# Patient Record
Sex: MALE | Race: WHITE | Employment: FULL TIME | URBAN - METROPOLITAN AREA
[De-identification: names, ages, dates, MRNs, and addresses within clinical notes are randomized per-mention and may not be internally consistent; named-entity substitution may affect disease eponyms.]

---

## 2018-08-02 ENCOUNTER — HOSPITAL ENCOUNTER (OUTPATIENT)
Age: 48
Discharge: HOME | End: 2018-08-04
Payer: COMMERCIAL

## 2018-08-02 ENCOUNTER — OFFICE VISIT (OUTPATIENT)
Dept: FAMILY MEDICINE CLINIC | Age: 48
End: 2018-08-02
Payer: COMMERCIAL

## 2018-08-02 DIAGNOSIS — Z00.00 ANNUAL PHYSICAL EXAM: ICD-10-CM

## 2018-08-02 DIAGNOSIS — Z00.00 ANNUAL PHYSICAL EXAM: Primary | ICD-10-CM

## 2018-08-02 PROBLEM — I10 BENIGN ESSENTIAL HTN: Status: ACTIVE | Noted: 2018-08-02

## 2018-08-02 LAB
ALBUMIN SERPL-MCNC: 4.8 G/DL (ref 3.5–5.2)
ALP BLD-CCNC: 50 U/L (ref 40–129)
ALT SERPL-CCNC: 17 U/L (ref 0–40)
ANION GAP SERPL CALCULATED.3IONS-SCNC: 20 MMOL/L (ref 7–16)
AST SERPL-CCNC: 17 U/L (ref 0–39)
BASOPHILS ABSOLUTE: 0.07 E9/L (ref 0–0.2)
BASOPHILS RELATIVE PERCENT: 0.9 % (ref 0–2)
BILIRUB SERPL-MCNC: 0.6 MG/DL (ref 0–1.2)
BUN BLDV-MCNC: 14 MG/DL (ref 6–20)
CALCIUM SERPL-MCNC: 9.7 MG/DL (ref 8.6–10.2)
CHLORIDE BLD-SCNC: 100 MMOL/L (ref 98–107)
CHOLESTEROL, TOTAL: 268 MG/DL (ref 0–199)
CO2: 19 MMOL/L (ref 22–29)
CREAT SERPL-MCNC: 0.9 MG/DL (ref 0.7–1.2)
EOSINOPHILS ABSOLUTE: 0.13 E9/L (ref 0.05–0.5)
EOSINOPHILS RELATIVE PERCENT: 1.7 % (ref 0–6)
GFR AFRICAN AMERICAN: >60
GFR NON-AFRICAN AMERICAN: >60 ML/MIN/1.73
GLUCOSE BLD-MCNC: 96 MG/DL (ref 74–109)
HBA1C MFR BLD: 5.4 % (ref 4–5.6)
HCT VFR BLD CALC: 46.7 % (ref 37–54)
HDLC SERPL-MCNC: 73 MG/DL
HEMOGLOBIN: 15.5 G/DL (ref 12.5–16.5)
IMMATURE GRANULOCYTES #: 0.05 E9/L
IMMATURE GRANULOCYTES %: 0.7 % (ref 0–5)
LDL CHOLESTEROL CALCULATED: 172 MG/DL (ref 0–99)
LYMPHOCYTES ABSOLUTE: 2.41 E9/L (ref 1.5–4)
LYMPHOCYTES RELATIVE PERCENT: 31.8 % (ref 20–42)
MCH RBC QN AUTO: 32.8 PG (ref 26–35)
MCHC RBC AUTO-ENTMCNC: 33.2 % (ref 32–34.5)
MCV RBC AUTO: 98.7 FL (ref 80–99.9)
MONOCYTES ABSOLUTE: 0.64 E9/L (ref 0.1–0.95)
MONOCYTES RELATIVE PERCENT: 8.4 % (ref 2–12)
NEUTROPHILS ABSOLUTE: 4.29 E9/L (ref 1.8–7.3)
NEUTROPHILS RELATIVE PERCENT: 56.5 % (ref 43–80)
PDW BLD-RTO: 12.4 FL (ref 11.5–15)
PLATELET # BLD: 332 E9/L (ref 130–450)
PMV BLD AUTO: 9.6 FL (ref 7–12)
POTASSIUM SERPL-SCNC: 4.3 MMOL/L (ref 3.5–5)
RBC # BLD: 4.73 E12/L (ref 3.8–5.8)
SODIUM BLD-SCNC: 139 MMOL/L (ref 132–146)
TOTAL PROTEIN: 7.9 G/DL (ref 6.4–8.3)
TRIGL SERPL-MCNC: 117 MG/DL (ref 0–149)
TSH SERPL DL<=0.05 MIU/L-ACNC: 1.08 UIU/ML (ref 0.27–4.2)
URIC ACID, SERUM: 7.9 MG/DL (ref 3.4–7)
VLDLC SERPL CALC-MCNC: 23 MG/DL
WBC # BLD: 7.6 E9/L (ref 4.5–11.5)

## 2018-08-02 PROCEDURE — 83036 HEMOGLOBIN GLYCOSYLATED A1C: CPT

## 2018-08-02 PROCEDURE — 85025 COMPLETE CBC W/AUTO DIFF WBC: CPT

## 2018-08-02 PROCEDURE — 80061 LIPID PANEL: CPT

## 2018-08-02 PROCEDURE — 84550 ASSAY OF BLOOD/URIC ACID: CPT

## 2018-08-02 PROCEDURE — 84443 ASSAY THYROID STIM HORMONE: CPT

## 2018-08-02 PROCEDURE — 99396 PREV VISIT EST AGE 40-64: CPT | Performed by: FAMILY MEDICINE

## 2018-08-02 PROCEDURE — 36415 COLL VENOUS BLD VENIPUNCTURE: CPT

## 2018-08-02 PROCEDURE — 80053 COMPREHEN METABOLIC PANEL: CPT

## 2018-08-02 RX ORDER — EPINEPHRINE 0.3 MG/.3ML
0.3 INJECTION SUBCUTANEOUS ONCE
Qty: 0.3 ML | Refills: 5 | Status: SHIPPED | OUTPATIENT
Start: 2018-08-02 | End: 2021-04-26 | Stop reason: ALTCHOICE

## 2018-08-02 RX ORDER — TADALAFIL 20 MG/1
20 TABLET ORAL PRN
Qty: 30 TABLET | Refills: 5 | Status: SHIPPED | OUTPATIENT
Start: 2018-08-02 | End: 2020-10-13 | Stop reason: SDUPTHER

## 2018-08-02 RX ORDER — VALACYCLOVIR HYDROCHLORIDE 500 MG/1
500 TABLET, FILM COATED ORAL 2 TIMES DAILY
Qty: 60 TABLET | Refills: 5 | Status: SHIPPED | OUTPATIENT
Start: 2018-08-02 | End: 2020-10-13 | Stop reason: SDUPTHER

## 2018-08-02 RX ORDER — PREDNISONE 20 MG/1
40 TABLET ORAL DAILY
Qty: 10 TABLET | Refills: 5 | Status: SHIPPED | OUTPATIENT
Start: 2018-08-02 | End: 2020-10-17 | Stop reason: SDUPTHER

## 2018-08-02 RX ORDER — AMLODIPINE BESYLATE AND BENAZEPRIL HYDROCHLORIDE 10; 20 MG/1; MG/1
CAPSULE ORAL
Qty: 90 CAPSULE | Refills: 5 | Status: SHIPPED | OUTPATIENT
Start: 2018-08-02 | End: 2019-10-27 | Stop reason: SDUPTHER

## 2018-08-02 RX ORDER — AMLODIPINE BESYLATE AND BENAZEPRIL HYDROCHLORIDE 10; 20 MG/1; MG/1
CAPSULE ORAL
Qty: 90 CAPSULE | Refills: 5 | Status: SHIPPED | OUTPATIENT
Start: 2018-08-02 | End: 2018-08-02

## 2018-08-02 RX ORDER — INDOMETHACIN 50 MG/1
50 CAPSULE ORAL 3 TIMES DAILY PRN
Qty: 90 CAPSULE | Refills: 4 | Status: SHIPPED | OUTPATIENT
Start: 2018-08-02 | End: 2020-05-07 | Stop reason: SDUPTHER

## 2018-08-02 ASSESSMENT — PATIENT HEALTH QUESTIONNAIRE - PHQ9
SUM OF ALL RESPONSES TO PHQ QUESTIONS 1-9: 0
SUM OF ALL RESPONSES TO PHQ9 QUESTIONS 1 & 2: 0
2. FEELING DOWN, DEPRESSED OR HOPELESS: 0
1. LITTLE INTEREST OR PLEASURE IN DOING THINGS: 0

## 2018-08-04 VITALS
WEIGHT: 245 LBS | HEIGHT: 72 IN | SYSTOLIC BLOOD PRESSURE: 138 MMHG | DIASTOLIC BLOOD PRESSURE: 80 MMHG | BODY MASS INDEX: 33.18 KG/M2 | HEART RATE: 74 BPM | OXYGEN SATURATION: 95 %

## 2018-08-04 ASSESSMENT — ENCOUNTER SYMPTOMS
DIARRHEA: 0
BACK PAIN: 0
EYE ITCHING: 0
WHEEZING: 0
EYE PAIN: 0
EYE DISCHARGE: 0
RECTAL PAIN: 0
VOMITING: 0
EYE REDNESS: 0
ANAL BLEEDING: 0
PHOTOPHOBIA: 0
FACIAL SWELLING: 0
CONSTIPATION: 0
CHOKING: 0
CHEST TIGHTNESS: 0
SORE THROAT: 0
BLOOD IN STOOL: 0
RHINORRHEA: 0
ABDOMINAL DISTENTION: 0
COLOR CHANGE: 0
ABDOMINAL PAIN: 0
SINUS PRESSURE: 0
SHORTNESS OF BREATH: 0
COUGH: 0
NAUSEA: 0
TROUBLE SWALLOWING: 0
VOICE CHANGE: 0
STRIDOR: 0
APNEA: 0

## 2018-08-04 NOTE — PROGRESS NOTES
nausea, rectal pain and vomiting. Endocrine: Negative for cold intolerance, heat intolerance, polydipsia, polyphagia and polyuria. Genitourinary: Negative for decreased urine volume, difficulty urinating, discharge, dysuria, enuresis, flank pain, frequency, genital sores, hematuria, penile pain, penile swelling, scrotal swelling, testicular pain and urgency. Musculoskeletal: Negative for arthralgias, back pain, gait problem, joint swelling, myalgias, neck pain and neck stiffness. Skin: Negative for color change, pallor, rash and wound. Allergic/Immunologic: Negative for environmental allergies, food allergies and immunocompromised state. Neurological: Negative for dizziness, tremors, seizures, syncope, facial asymmetry, speech difficulty, weakness, light-headedness, numbness and headaches. Hematological: Negative for adenopathy. Does not bruise/bleed easily. Psychiatric/Behavioral: Negative for agitation, behavioral problems, confusion, decreased concentration, dysphoric mood, hallucinations, self-injury, sleep disturbance and suicidal ideas. The patient is not nervous/anxious and is not hyperactive.       Past Medical History:   Diagnosis Date    Gout     Hypertension        Social History   Substance Use Topics    Smoking status: Current Every Day Smoker     Packs/day: 1.50    Smokeless tobacco: Never Used    Alcohol use 0.0 oz/week      Comment: social       Family History   Problem Relation Age of Onset    Cancer Father         leukomia       Current Outpatient Prescriptions   Medication Sig Dispense Refill    EPINEPHrine (EPIPEN 2-EVERTON) 0.3 MG/0.3ML SOAJ injection Inject 0.3 mLs into the muscle once for 1 dose Use as directed for allergic reaction 0.3 mL 5    valACYclovir (VALTREX) 500 MG tablet Take 1 tablet by mouth 2 times daily 60 tablet 5    indomethacin (INDOCIN) 50 MG capsule Take 1 capsule by mouth 3 times daily as needed for Pain (with meals) 90 capsule 4    tadalafil (CIALIS) Genitourinary:   Genitourinary Comments: Deferred by patient   Musculoskeletal: Normal range of motion. He exhibits no edema or tenderness. Lymphadenopathy:     He has no cervical adenopathy. Neurological: He is alert and oriented to person, place, and time. He has normal reflexes. No cranial nerve deficit. He exhibits normal muscle tone. Coordination normal.   Skin: Skin is warm and dry. No rash noted. He is not diaphoretic. No erythema. No pallor. Psychiatric: He has a normal mood and affect. His behavior is normal. Judgment and thought content normal.   Nursing note and vitals reviewed. Assessment / Plan:      Riddhi Nick was seen today for annual exam and hypertension. Diagnoses and all orders for this visit:    Annual physical exam  -     TSH without Reflex; Future  -     Hemoglobin A1C; Future  -     CBC Auto Differential; Future  -     Comprehensive Metabolic Panel; Future  -     Lipid Panel; Future  -     URIC ACID; Future    Other orders  -     EPINEPHrine (EPIPEN 2-EVERTON) 0.3 MG/0.3ML SOAJ injection; Inject 0.3 mLs into the muscle once for 1 dose Use as directed for allergic reaction  -     Discontinue: amLODIPine-benazepril (LOTREL) 10-20 MG per capsule; TAKE 1 CAPSULE DAILY  -     valACYclovir (VALTREX) 500 MG tablet; Take 1 tablet by mouth 2 times daily  -     indomethacin (INDOCIN) 50 MG capsule; Take 1 capsule by mouth 3 times daily as needed for Pain (with meals)  -     tadalafil (CIALIS) 20 MG tablet; Take 1 tablet by mouth as needed for Erectile Dysfunction  -     predniSONE (DELTASONE) 20 MG tablet; Take 2 tablets by mouth daily for 5 days  -     amLODIPine-benazepril (LOTREL) 10-20 MG per capsule; TAKE 1 CAPSULE DAILY      reviewed health maintenance report. Patient is aware of deficiencies and suggested preventative tests. Discussed case at length.

## 2018-08-06 DIAGNOSIS — F17.211 CIGARETTE NICOTINE DEPENDENCE IN REMISSION: ICD-10-CM

## 2018-08-06 NOTE — TELEPHONE ENCOUNTER
From: Carolynn Sebastian  Sent: 8/6/2018 3:27 PM EDT  Subject: Medication Renewal Request    Carolynn Sebastian would like a refill of the following medications:     varenicline (CHANTIX CONTINUING MONTH PAK) 1 MG tablet [Chase Khanna, DO]     varenicline (CHANTIX STARTING MONTH PAK) 0.5 MG X 11 & 1 MG X 42 tablet Love Morgan, ]    Preferred pharmacy: [address] [place] [phone] - F [phone]

## 2018-08-08 ENCOUNTER — TELEPHONE (OUTPATIENT)
Dept: FAMILY MEDICINE CLINIC | Age: 48
End: 2018-08-08

## 2018-08-08 DIAGNOSIS — E78.5 HYPERLIPIDEMIA, UNSPECIFIED HYPERLIPIDEMIA TYPE: ICD-10-CM

## 2018-08-08 DIAGNOSIS — M10.9 GOUT, UNSPECIFIED CAUSE, UNSPECIFIED CHRONICITY, UNSPECIFIED SITE: ICD-10-CM

## 2018-08-08 RX ORDER — VARENICLINE TARTRATE 1 MG/1
1 TABLET, FILM COATED ORAL 2 TIMES DAILY
Qty: 60 TABLET | Refills: 5 | Status: SHIPPED | OUTPATIENT
Start: 2018-08-08 | End: 2019-08-22 | Stop reason: SDUPTHER

## 2018-08-08 RX ORDER — VARENICLINE TARTRATE 25 MG
KIT ORAL
Qty: 1 EACH | Refills: 0 | Status: SHIPPED | OUTPATIENT
Start: 2018-08-08 | End: 2018-08-14 | Stop reason: CLARIF

## 2018-08-08 NOTE — TELEPHONE ENCOUNTER
----- Message from Emma Rubi DO sent at 8/6/2018 11:41 PM EDT -----  bw looks very good except elevated cholesterol. Its high enough that we should probably start a medication.  However if he wants to try three months of oct red rice yeast we can do that.recheck fasting in three months

## 2019-06-05 ENCOUNTER — OFFICE VISIT (OUTPATIENT)
Dept: FAMILY MEDICINE CLINIC | Age: 49
End: 2019-06-05
Payer: COMMERCIAL

## 2019-06-05 VITALS
BODY MASS INDEX: 30.07 KG/M2 | WEIGHT: 222 LBS | OXYGEN SATURATION: 97 % | DIASTOLIC BLOOD PRESSURE: 84 MMHG | SYSTOLIC BLOOD PRESSURE: 136 MMHG | RESPIRATION RATE: 18 BRPM | HEIGHT: 72 IN | HEART RATE: 81 BPM

## 2019-06-05 DIAGNOSIS — S22.000A CLOSED COMPRESSION FRACTURE OF THORACIC VERTEBRA, INITIAL ENCOUNTER (HCC): Primary | ICD-10-CM

## 2019-06-05 DIAGNOSIS — S32.010A CLOSED COMPRESSION FRACTURE OF FIRST LUMBAR VERTEBRA, INITIAL ENCOUNTER: ICD-10-CM

## 2019-06-05 PROCEDURE — 99214 OFFICE O/P EST MOD 30 MIN: CPT | Performed by: FAMILY MEDICINE

## 2019-06-05 RX ORDER — ORPHENADRINE CITRATE 100 MG/1
100 TABLET, EXTENDED RELEASE ORAL 2 TIMES DAILY
Qty: 60 TABLET | Refills: 0 | Status: SHIPPED | OUTPATIENT
Start: 2019-06-05 | End: 2019-07-05

## 2019-06-05 RX ORDER — OXYCODONE AND ACETAMINOPHEN 10; 325 MG/1; MG/1
1 TABLET ORAL EVERY 4 HOURS PRN
Qty: 60 TABLET | Refills: 0 | Status: SHIPPED | OUTPATIENT
Start: 2019-06-05 | End: 2019-07-25 | Stop reason: SDUPTHER

## 2019-06-05 RX ORDER — INDOMETHACIN 50 MG/1
50 CAPSULE ORAL 2 TIMES DAILY WITH MEALS
Qty: 60 CAPSULE | Refills: 3 | Status: SHIPPED | OUTPATIENT
Start: 2019-06-05 | End: 2019-07-25

## 2019-06-05 ASSESSMENT — PATIENT HEALTH QUESTIONNAIRE - PHQ9
1. LITTLE INTEREST OR PLEASURE IN DOING THINGS: 0
SUM OF ALL RESPONSES TO PHQ9 QUESTIONS 1 & 2: 0
2. FEELING DOWN, DEPRESSED OR HOPELESS: 0
SUM OF ALL RESPONSES TO PHQ QUESTIONS 1-9: 0
SUM OF ALL RESPONSES TO PHQ QUESTIONS 1-9: 0

## 2019-06-06 ASSESSMENT — ENCOUNTER SYMPTOMS
BACK PAIN: 1
ANAL BLEEDING: 0
SINUS PRESSURE: 0
CHEST TIGHTNESS: 0
COLOR CHANGE: 0
ABDOMINAL PAIN: 0
VOMITING: 0
RHINORRHEA: 0
APNEA: 0
CHOKING: 0
WHEEZING: 0
STRIDOR: 0
DIARRHEA: 0
FACIAL SWELLING: 0
COUGH: 0
RECTAL PAIN: 0
VOICE CHANGE: 0
CONSTIPATION: 0
BLOOD IN STOOL: 0
ABDOMINAL DISTENTION: 0
NAUSEA: 0
SHORTNESS OF BREATH: 0
SORE THROAT: 0
TROUBLE SWALLOWING: 0

## 2019-06-06 NOTE — PROGRESS NOTES
Aileen Kemp is a 51 y.o. male  . Subjective:      Patient has history of lumbar fusion by Dr Michelle Arias . Patient fell last week. He was seen in ER and diagnosed with two compression fractures to thoracic and two lumbar. Pain went from 10/10 to about 8/10 currently. Was given percocet and ibuprofen. Is almost out of percocet. No urinary or BM complaints. Pain radiating down both legs. Review of Systems   Constitutional: Negative for activity change, appetite change, chills, diaphoresis, fatigue, fever and unexpected weight change. HENT: Negative for congestion, dental problem, ear discharge, ear pain, facial swelling, hearing loss, mouth sores, nosebleeds, postnasal drip, rhinorrhea, sinus pressure, sneezing, sore throat, tinnitus, trouble swallowing and voice change. Respiratory: Negative for apnea, cough, choking, chest tightness, shortness of breath, wheezing and stridor. Cardiovascular: Negative for chest pain, palpitations and leg swelling. Gastrointestinal: Negative for abdominal distention, abdominal pain, anal bleeding, blood in stool, constipation, diarrhea, nausea, rectal pain and vomiting. Endocrine: Negative for cold intolerance, heat intolerance, polydipsia, polyphagia and polyuria. Musculoskeletal: Positive for arthralgias, back pain, gait problem and myalgias. Skin: Negative for color change, pallor, rash and wound. Psychiatric/Behavioral: Negative for dysphoric mood, self-injury, sleep disturbance and suicidal ideas. The patient is not nervous/anxious.         Past Medical History:   Diagnosis Date    Gout     Hyperlipidemia     Hypertension        Social History     Socioeconomic History    Marital status: Single     Spouse name: Not on file    Number of children: Not on file    Years of education: Not on file    Highest education level: Not on file   Occupational History    Not on file   Social Needs    Financial resource strain: Not on file    Food insecurity: Worry: Not on file     Inability: Not on file    Transportation needs:     Medical: Not on file     Non-medical: Not on file   Tobacco Use    Smoking status: Current Every Day Smoker     Packs/day: 1.50    Smokeless tobacco: Never Used   Substance and Sexual Activity    Alcohol use:  Yes     Alcohol/week: 0.0 oz     Comment: social    Drug use: Not on file    Sexual activity: Not on file   Lifestyle    Physical activity:     Days per week: Not on file     Minutes per session: Not on file    Stress: Not on file   Relationships    Social connections:     Talks on phone: Not on file     Gets together: Not on file     Attends Yarsani service: Not on file     Active member of club or organization: Not on file     Attends meetings of clubs or organizations: Not on file     Relationship status: Not on file    Intimate partner violence:     Fear of current or ex partner: Not on file     Emotionally abused: Not on file     Physically abused: Not on file     Forced sexual activity: Not on file   Other Topics Concern    Not on file   Social History Narrative    Not on file       Family History   Problem Relation Age of Onset    Cancer Father         leukomia       Current Outpatient Medications on File Prior to Visit   Medication Sig Dispense Refill    Red Yeast Rice Extract (RED YEAST RICE PO) Take 1 tablet by mouth daily      varenicline (CHANTIX CONTINUING MONTH EVERTON) 1 MG tablet Take 1 tablet by mouth 2 times daily 60 tablet 5    valACYclovir (VALTREX) 500 MG tablet Take 1 tablet by mouth 2 times daily 60 tablet 5    indomethacin (INDOCIN) 50 MG capsule Take 1 capsule by mouth 3 times daily as needed for Pain (with meals) 90 capsule 4    tadalafil (CIALIS) 20 MG tablet Take 1 tablet by mouth as needed for Erectile Dysfunction 30 tablet 5    amLODIPine-benazepril (LOTREL) 10-20 MG per capsule TAKE 1 CAPSULE DAILY 90 capsule 5    EPINEPHrine (EPIPEN 2-EVERTON) 0.3 MG/0.3ML SOAJ injection Inject 0.3 mLs into the muscle once for 1 dose Use as directed for allergic reaction 0.3 mL 5     No current facility-administered medications on file prior to visit. Allergies   Allergen Reactions    Peanuts [Peanut Oil]        I have reviewed his allergies, medications, problem list, medical,social and family history and have updated as needed in the electronic medical record. Objective:     Physical Exam   Constitutional: He is oriented to person, place, and time. He appears well-developed and well-nourished. No distress. HENT:   Head: Normocephalic and atraumatic. Right Ear: External ear normal.   Left Ear: External ear normal.   Nose: Nose normal.   Mouth/Throat: Oropharynx is clear and moist. No oropharyngeal exudate. Eyes: Pupils are equal, round, and reactive to light. Conjunctivae and EOM are normal. Right eye exhibits no discharge. Left eye exhibits no discharge. No scleral icterus. Neck: Normal range of motion. Neck supple. No JVD present. No tracheal deviation present. No thyromegaly present. Cardiovascular: Normal rate, regular rhythm, normal heart sounds and intact distal pulses. Exam reveals no gallop and no friction rub. No murmur heard. Pulmonary/Chest: Effort normal and breath sounds normal. No stridor. No respiratory distress. He has no wheezes. He has no rales. He exhibits no tenderness. Abdominal: Soft. Bowel sounds are normal. He exhibits no distension and no mass. There is no tenderness. There is no rebound and no guarding. Genitourinary:   Genitourinary Comments: Deferred by patient   Musculoskeletal:   Increased spasm T3 to L1 decreased ROM  Increased spasm L1 to S1 with decreased ROM. + straight leg raising and contralateral straight leg raising tests B/L   Lymphadenopathy:     He has no cervical adenopathy. Neurological: He is alert and oriented to person, place, and time. He has normal reflexes. He displays normal reflexes. No cranial nerve deficit.  He exhibits normal muscle

## 2019-07-24 ENCOUNTER — TELEPHONE (OUTPATIENT)
Dept: FAMILY MEDICINE CLINIC | Age: 49
End: 2019-07-24

## 2019-07-25 ENCOUNTER — OFFICE VISIT (OUTPATIENT)
Dept: FAMILY MEDICINE CLINIC | Age: 49
End: 2019-07-25
Payer: COMMERCIAL

## 2019-07-25 VITALS
BODY MASS INDEX: 29.66 KG/M2 | WEIGHT: 219 LBS | SYSTOLIC BLOOD PRESSURE: 130 MMHG | HEART RATE: 87 BPM | HEIGHT: 72 IN | RESPIRATION RATE: 18 BRPM | OXYGEN SATURATION: 96 % | DIASTOLIC BLOOD PRESSURE: 84 MMHG

## 2019-07-25 DIAGNOSIS — S22.000A CLOSED COMPRESSION FRACTURE OF THORACIC VERTEBRA, INITIAL ENCOUNTER (HCC): ICD-10-CM

## 2019-07-25 DIAGNOSIS — S32.010A CLOSED COMPRESSION FRACTURE OF FIRST LUMBAR VERTEBRA, INITIAL ENCOUNTER: ICD-10-CM

## 2019-07-25 PROCEDURE — 99213 OFFICE O/P EST LOW 20 MIN: CPT | Performed by: FAMILY MEDICINE

## 2019-07-25 RX ORDER — METAXALONE 800 MG/1
800 TABLET ORAL 4 TIMES DAILY
Qty: 120 TABLET | Refills: 3 | Status: SHIPPED | OUTPATIENT
Start: 2019-07-25 | End: 2019-08-24

## 2019-07-25 RX ORDER — OXYCODONE AND ACETAMINOPHEN 10; 325 MG/1; MG/1
1 TABLET ORAL EVERY 4 HOURS PRN
Qty: 120 TABLET | Refills: 0 | Status: SHIPPED | OUTPATIENT
Start: 2019-07-25 | End: 2020-05-07 | Stop reason: SDUPTHER

## 2019-07-25 ASSESSMENT — PATIENT HEALTH QUESTIONNAIRE - PHQ9
SUM OF ALL RESPONSES TO PHQ QUESTIONS 1-9: 0
2. FEELING DOWN, DEPRESSED OR HOPELESS: 0
1. LITTLE INTEREST OR PLEASURE IN DOING THINGS: 0
SUM OF ALL RESPONSES TO PHQ QUESTIONS 1-9: 0
SUM OF ALL RESPONSES TO PHQ9 QUESTIONS 1 & 2: 0

## 2019-07-27 ASSESSMENT — ENCOUNTER SYMPTOMS
CHOKING: 0
RECTAL PAIN: 0
DIARRHEA: 0
SHORTNESS OF BREATH: 0
STRIDOR: 0
APNEA: 0
RHINORRHEA: 0
NAUSEA: 0
ABDOMINAL DISTENTION: 0
BLOOD IN STOOL: 0
VOMITING: 0
BACK PAIN: 1
TROUBLE SWALLOWING: 0
COUGH: 0
SORE THROAT: 0
CONSTIPATION: 0
WHEEZING: 0
COLOR CHANGE: 0
VOICE CHANGE: 0
CHEST TIGHTNESS: 0
ABDOMINAL PAIN: 0
FACIAL SWELLING: 0
ANAL BLEEDING: 0
SINUS PRESSURE: 0

## 2019-07-27 NOTE — PROGRESS NOTES
Byron Blount is a 51 y.o. male  . Subjective:      Patient has history of lumbar fusion by Dr Jose Luis Delgado . Patient fell last week. He was seen in ER and diagnosed with two compression fractures to thoracic and two lumbar. Pain went from 10/10 to about 8/10 currently. Was given percocet and ibuprofen. Is almost out of percocet. No urinary or BM complaints. Pain radiating down both legs. Back Pain   This is a recurrent problem. The current episode started more than 1 month ago. The problem occurs daily. The problem has been gradually improving since onset. The pain is present in the lumbar spine. The quality of the pain is described as aching. The pain does not radiate. The pain is at a severity of 6/10. The pain is moderate. The pain is the same all the time. The symptoms are aggravated by bending, position, standing, twisting and sitting. Stiffness is present all day. Pertinent negatives include no abdominal pain, chest pain or fever. He has tried analgesics for the symptoms. The treatment provided mild relief. Review of Systems   Constitutional: Negative for activity change, appetite change, chills, diaphoresis, fatigue, fever and unexpected weight change. HENT: Negative for congestion, dental problem, ear discharge, ear pain, facial swelling, hearing loss, mouth sores, nosebleeds, postnasal drip, rhinorrhea, sinus pressure, sneezing, sore throat, tinnitus, trouble swallowing and voice change. Respiratory: Negative for apnea, cough, choking, chest tightness, shortness of breath, wheezing and stridor. Cardiovascular: Negative for chest pain, palpitations and leg swelling. Gastrointestinal: Negative for abdominal distention, abdominal pain, anal bleeding, blood in stool, constipation, diarrhea, nausea, rectal pain and vomiting. Endocrine: Negative for cold intolerance, heat intolerance, polydipsia, polyphagia and polyuria.    Musculoskeletal: Positive for arthralgias, back pain, gait problem and

## 2019-08-22 DIAGNOSIS — F17.211 CIGARETTE NICOTINE DEPENDENCE IN REMISSION: ICD-10-CM

## 2019-08-22 RX ORDER — VARENICLINE TARTRATE 1 MG/1
1 TABLET, FILM COATED ORAL 2 TIMES DAILY
Qty: 60 TABLET | Refills: 5 | Status: SHIPPED
Start: 2019-08-22 | End: 2020-10-13 | Stop reason: SDUPTHER

## 2019-10-27 DIAGNOSIS — Z00.00 ANNUAL PHYSICAL EXAM: ICD-10-CM

## 2019-10-28 RX ORDER — AMLODIPINE BESYLATE AND BENAZEPRIL HYDROCHLORIDE 10; 20 MG/1; MG/1
CAPSULE ORAL
Qty: 90 CAPSULE | Refills: 4 | Status: SHIPPED
Start: 2019-10-28 | End: 2020-10-13 | Stop reason: SDUPTHER

## 2020-05-07 ENCOUNTER — VIRTUAL VISIT (OUTPATIENT)
Dept: FAMILY MEDICINE CLINIC | Age: 50
End: 2020-05-07
Payer: COMMERCIAL

## 2020-05-07 VITALS — WEIGHT: 215 LBS | BODY MASS INDEX: 29.12 KG/M2 | HEIGHT: 72 IN

## 2020-05-07 PROCEDURE — 99213 OFFICE O/P EST LOW 20 MIN: CPT | Performed by: FAMILY MEDICINE

## 2020-05-07 RX ORDER — OXYCODONE AND ACETAMINOPHEN 10; 325 MG/1; MG/1
1 TABLET ORAL EVERY 4 HOURS PRN
Qty: 120 TABLET | Refills: 0 | Status: SHIPPED
Start: 2020-05-07 | End: 2020-10-13 | Stop reason: SDUPTHER

## 2020-05-07 RX ORDER — INDOMETHACIN 50 MG/1
50 CAPSULE ORAL 3 TIMES DAILY PRN
Qty: 90 CAPSULE | Refills: 4 | Status: SHIPPED
Start: 2020-05-07 | End: 2020-10-13 | Stop reason: SDUPTHER

## 2020-05-07 RX ORDER — CHLORZOXAZONE 500 MG/1
500 TABLET ORAL 4 TIMES DAILY PRN
Qty: 120 TABLET | Refills: 3 | Status: SHIPPED | OUTPATIENT
Start: 2020-05-07 | End: 2020-06-06

## 2020-05-07 ASSESSMENT — PATIENT HEALTH QUESTIONNAIRE - PHQ9
SUM OF ALL RESPONSES TO PHQ QUESTIONS 1-9: 0
SUM OF ALL RESPONSES TO PHQ QUESTIONS 1-9: 0
SUM OF ALL RESPONSES TO PHQ9 QUESTIONS 1 & 2: 0
2. FEELING DOWN, DEPRESSED OR HOPELESS: 0
1. LITTLE INTEREST OR PLEASURE IN DOING THINGS: 0

## 2020-05-11 ASSESSMENT — ENCOUNTER SYMPTOMS
CHEST TIGHTNESS: 0
DIARRHEA: 0
NAUSEA: 0
ANAL BLEEDING: 0
RECTAL PAIN: 0
SHORTNESS OF BREATH: 0
BACK PAIN: 1
CONSTIPATION: 0
SORE THROAT: 0
EYE DISCHARGE: 0
VOMITING: 0
VOICE CHANGE: 0
WHEEZING: 0
PHOTOPHOBIA: 0
ABDOMINAL PAIN: 0
RHINORRHEA: 0
APNEA: 0
STRIDOR: 0
EYE REDNESS: 0
EYE ITCHING: 0
CHOKING: 0
EYE PAIN: 0
ABDOMINAL DISTENTION: 0
TROUBLE SWALLOWING: 0
COUGH: 0
SINUS PRESSURE: 0
BLOOD IN STOOL: 0
COLOR CHANGE: 0
FACIAL SWELLING: 0

## 2020-05-12 NOTE — PROGRESS NOTES
nature of medication and assumes all risks. Patient will not drink alcohol , work or drive while on medication.

## 2020-10-13 ENCOUNTER — OFFICE VISIT (OUTPATIENT)
Dept: FAMILY MEDICINE CLINIC | Age: 50
End: 2020-10-13
Payer: COMMERCIAL

## 2020-10-13 ENCOUNTER — HOSPITAL ENCOUNTER (OUTPATIENT)
Age: 50
Discharge: HOME | End: 2020-10-15
Payer: COMMERCIAL

## 2020-10-13 VITALS
RESPIRATION RATE: 18 BRPM | DIASTOLIC BLOOD PRESSURE: 80 MMHG | SYSTOLIC BLOOD PRESSURE: 138 MMHG | HEART RATE: 105 BPM | BODY MASS INDEX: 31.42 KG/M2 | HEIGHT: 72 IN | WEIGHT: 232 LBS | OXYGEN SATURATION: 98 %

## 2020-10-13 LAB — SARS-COV-2 ANTIBODY, TOTAL: NORMAL

## 2020-10-13 PROCEDURE — 86769 SARS-COV-2 COVID-19 ANTIBODY: CPT

## 2020-10-13 PROCEDURE — 99214 OFFICE O/P EST MOD 30 MIN: CPT | Performed by: FAMILY MEDICINE

## 2020-10-13 PROCEDURE — 36415 COLL VENOUS BLD VENIPUNCTURE: CPT

## 2020-10-13 RX ORDER — TADALAFIL 20 MG/1
20 TABLET ORAL PRN
Qty: 30 TABLET | Refills: 5 | Status: SHIPPED
Start: 2020-10-13 | End: 2020-10-13 | Stop reason: SDUPTHER

## 2020-10-13 RX ORDER — VALACYCLOVIR HYDROCHLORIDE 500 MG/1
500 TABLET, FILM COATED ORAL 2 TIMES DAILY
Qty: 60 TABLET | Refills: 5 | Status: SHIPPED
Start: 2020-10-13 | End: 2020-10-13 | Stop reason: SDUPTHER

## 2020-10-13 RX ORDER — FINASTERIDE 5 MG/1
5 TABLET, FILM COATED ORAL DAILY
Qty: 30 TABLET | Refills: 5 | Status: SHIPPED
Start: 2020-10-13 | End: 2021-04-26 | Stop reason: SDUPTHER

## 2020-10-13 RX ORDER — VARENICLINE TARTRATE 1 MG/1
1 TABLET, FILM COATED ORAL 2 TIMES DAILY
Qty: 60 TABLET | Refills: 5 | Status: SHIPPED
Start: 2020-10-13 | End: 2021-10-27

## 2020-10-13 RX ORDER — INDOMETHACIN 50 MG/1
50 CAPSULE ORAL 3 TIMES DAILY PRN
Qty: 90 CAPSULE | Refills: 4 | Status: SHIPPED
Start: 2020-10-13 | End: 2021-10-27 | Stop reason: SDUPTHER

## 2020-10-13 RX ORDER — AMLODIPINE BESYLATE AND BENAZEPRIL HYDROCHLORIDE 10; 20 MG/1; MG/1
CAPSULE ORAL
Qty: 90 CAPSULE | Refills: 4 | Status: SHIPPED
Start: 2020-10-13 | End: 2021-10-27 | Stop reason: SDUPTHER

## 2020-10-13 RX ORDER — TADALAFIL 20 MG/1
20 TABLET ORAL PRN
Qty: 30 TABLET | Refills: 5 | Status: SHIPPED | OUTPATIENT
Start: 2020-10-13 | End: 2021-10-27 | Stop reason: SDUPTHER

## 2020-10-13 RX ORDER — EPINEPHRINE 0.3 MG/.3ML
0.3 INJECTION SUBCUTANEOUS ONCE
Qty: 2 EACH | Refills: 5 | Status: SHIPPED | OUTPATIENT
Start: 2020-10-13 | End: 2020-10-13

## 2020-10-13 RX ORDER — VALACYCLOVIR HYDROCHLORIDE 500 MG/1
500 TABLET, FILM COATED ORAL 2 TIMES DAILY
Qty: 60 TABLET | Refills: 5 | Status: SHIPPED | OUTPATIENT
Start: 2020-10-13

## 2020-10-13 RX ORDER — OXYCODONE AND ACETAMINOPHEN 10; 325 MG/1; MG/1
1 TABLET ORAL EVERY 4 HOURS PRN
Qty: 120 TABLET | Refills: 0 | Status: SHIPPED | OUTPATIENT
Start: 2020-10-13 | End: 2021-04-26 | Stop reason: SDUPTHER

## 2020-10-16 NOTE — TELEPHONE ENCOUNTER
Patient called asking if Dr. Mary Pacheco would send RX for prednisone for flare ups he gets of Gout. I asked patient if he is currently have a problem and he stated he is not, but would like to know if RX will be sent to pharmacy.     Last seen 10/13/2020  Next appt 4/13/2021  [facility]/[address]os [address]to

## 2020-10-17 RX ORDER — PREDNISONE 20 MG/1
40 TABLET ORAL DAILY
Qty: 10 TABLET | Refills: 5 | Status: SHIPPED | OUTPATIENT
Start: 2020-10-17 | End: 2020-10-22

## 2020-10-18 ASSESSMENT — ENCOUNTER SYMPTOMS
BACK PAIN: 1
COLOR CHANGE: 0
EYE REDNESS: 0
VOICE CHANGE: 0
EYE PAIN: 0
WHEEZING: 0
SINUS PRESSURE: 0
ABDOMINAL PAIN: 0
SORE THROAT: 0
APNEA: 0
SHORTNESS OF BREATH: 0
CHEST TIGHTNESS: 0
PHOTOPHOBIA: 0
NAUSEA: 0
COUGH: 0
TROUBLE SWALLOWING: 0
CHOKING: 0
STRIDOR: 0
VOMITING: 0
RHINORRHEA: 0
DIARRHEA: 0
ABDOMINAL DISTENTION: 0
RECTAL PAIN: 0
EYE ITCHING: 0
EYE DISCHARGE: 0
BLOOD IN STOOL: 0
FACIAL SWELLING: 0
ANAL BLEEDING: 0
CONSTIPATION: 0

## 2020-10-18 NOTE — PROGRESS NOTES
episode started more than 1 year ago. The problem has been gradually improving since onset. The problem is resistant. Pertinent negatives include no chest pain, headaches, neck pain, palpitations or shortness of breath. There are no associated agents to hypertension. Risk factors for coronary artery disease include smoking/tobacco exposure, male gender, stress and dyslipidemia. Past treatments include calcium channel blockers and ACE inhibitors. The current treatment provides moderate improvement. There are no compliance problems. Benign Prostatic Hypertrophy   This is a chronic problem. The current episode started more than 1 year ago. The problem has been waxing and waning since onset. Irritative symptoms do not include frequency or urgency. Obstructive symptoms include a slower stream. Pertinent negatives include no chills, dysuria, hematuria, nausea or vomiting. He is sexually active. Nothing aggravates the symptoms. Past treatments include nothing. The treatment provided no relief. Erectile Dysfunction   This is a chronic problem. The current episode started more than 1 year ago. The problem has been waxing and waning since onset. The nature of his difficulty is maintaining erection. He reports no anxiety. Irritative symptoms do not include frequency or urgency. Obstructive symptoms include a slower stream. Pertinent negatives include no chills, dysuria or hematuria. Nothing aggravates the symptoms. Past treatments include nothing. The treatment provided no relief. Risk factors include hypertension and tobacco use. Review of Systems   Constitutional: Positive for fatigue. Negative for activity change, appetite change, chills, diaphoresis, fever and unexpected weight change.    HENT: Negative for congestion, dental problem, drooling, ear discharge, ear pain, facial swelling, hearing loss, mouth sores, nosebleeds, postnasal drip, rhinorrhea, sinus pressure, sneezing, sore throat, tinnitus, trouble swallowing and voice change. Eyes: Negative for photophobia, pain, discharge, redness, itching and visual disturbance. Respiratory: Negative for apnea, cough, choking, chest tightness, shortness of breath, wheezing and stridor. Cardiovascular: Negative for chest pain, palpitations and leg swelling. Gastrointestinal: Negative for abdominal distention, abdominal pain, anal bleeding, blood in stool, constipation, diarrhea, nausea, rectal pain and vomiting. Endocrine: Negative for cold intolerance, heat intolerance, polydipsia, polyphagia and polyuria. Genitourinary: Negative for decreased urine volume, difficulty urinating, discharge, dysuria, enuresis, flank pain, frequency, genital sores, hematuria, penile pain, penile swelling, scrotal swelling, testicular pain and urgency. ED, slower stream   Musculoskeletal: Positive for arthralgias, back pain, gait problem and myalgias. Negative for joint swelling, neck pain and neck stiffness. Skin: Negative for color change, pallor, rash and wound. Allergic/Immunologic: Negative for environmental allergies, food allergies and immunocompromised state. Neurological: Negative for dizziness, tremors, seizures, syncope, facial asymmetry, speech difficulty, weakness, light-headedness, numbness and headaches. Hematological: Negative for adenopathy. Does not bruise/bleed easily. Psychiatric/Behavioral: Negative for agitation, behavioral problems, confusion, decreased concentration, dysphoric mood, hallucinations, self-injury, sleep disturbance and suicidal ideas. The patient is not nervous/anxious and is not hyperactive.         Past Medical History:   Diagnosis Date    Gout     Hyperlipidemia     Hypertension        Social History     Socioeconomic History    Marital status: Single     Spouse name: Not on file    Number of children: Not on file    Years of education: Not on file    Highest education level: Not on file   Occupational History    Not on file   Social Needs    Financial resource strain: Not on file    Food insecurity     Worry: Not on file     Inability: Not on file    Transportation needs     Medical: Not on file     Non-medical: Not on file   Tobacco Use    Smoking status: Current Every Day Smoker     Packs/day: 1.50    Smokeless tobacco: Never Used   Substance and Sexual Activity    Alcohol use: Yes     Alcohol/week: 0.0 standard drinks     Comment: social    Drug use: Not on file    Sexual activity: Not on file   Lifestyle    Physical activity     Days per week: Not on file     Minutes per session: Not on file    Stress: Not on file   Relationships    Social connections     Talks on phone: Not on file     Gets together: Not on file     Attends Church service: Not on file     Active member of club or organization: Not on file     Attends meetings of clubs or organizations: Not on file     Relationship status: Not on file    Intimate partner violence     Fear of current or ex partner: Not on file     Emotionally abused: Not on file     Physically abused: Not on file     Forced sexual activity: Not on file   Other Topics Concern    Not on file   Social History Narrative    Not on file       Family History   Problem Relation Age of Onset    Cancer Father         leukomia       Current Outpatient Medications on File Prior to Visit   Medication Sig Dispense Refill    Red Yeast Rice Extract (RED YEAST RICE PO) Take 1 tablet by mouth daily      EPINEPHrine (EPIPEN 2-EVERTON) 0.3 MG/0.3ML SOAJ injection Inject 0.3 mLs into the muscle once for 1 dose Use as directed for allergic reaction 0.3 mL 5     No current facility-administered medications on file prior to visit. Allergies   Allergen Reactions    Peanuts [Peanut Oil]        I have reviewed his allergies, medications, problem list, medical,social and family history and have updated as needed in the electronic medical record.     Objective:     Physical Exam  Vitals signs and nursing note reviewed. Constitutional:       General: He is not in acute distress. Appearance: He is well-developed. He is not diaphoretic. HENT:      Head: Normocephalic and atraumatic. Right Ear: External ear normal.      Left Ear: External ear normal.      Nose: Nose normal.      Mouth/Throat:      Pharynx: No oropharyngeal exudate. Eyes:      General: No scleral icterus. Right eye: No discharge. Left eye: No discharge. Conjunctiva/sclera: Conjunctivae normal.      Pupils: Pupils are equal, round, and reactive to light. Neck:      Musculoskeletal: Normal range of motion and neck supple. Thyroid: No thyromegaly. Vascular: No JVD. Trachea: No tracheal deviation. Cardiovascular:      Rate and Rhythm: Normal rate and regular rhythm. Heart sounds: Normal heart sounds. No murmur. No friction rub. No gallop. Pulmonary:      Effort: Pulmonary effort is normal. No respiratory distress. Breath sounds: Normal breath sounds. No stridor. No wheezing or rales. Chest:      Chest wall: No tenderness. Abdominal:      General: Bowel sounds are normal. There is no distension. Palpations: Abdomen is soft. There is no mass. Tenderness: There is no abdominal tenderness. There is no guarding or rebound. Genitourinary:     Comments: Deferred by patient  Musculoskeletal: Normal range of motion. General: No tenderness. Comments: Increased spasm L1 to S1 with decreased ROM. + straight leg raising and contralateral straight leg raising tests B/L   Lymphadenopathy:      Cervical: No cervical adenopathy. Skin:     General: Skin is warm and dry. Coloration: Skin is not pale. Findings: No erythema or rash. Neurological:      Mental Status: He is alert and oriented to person, place, and time. Cranial Nerves: No cranial nerve deficit. Motor: No abnormal muscle tone.       Coordination: Coordination normal.      Deep Tendon Reflexes: Reflexes are normal and symmetric. Reflexes normal.   Psychiatric:         Behavior: Behavior normal.         Thought Content: Thought content normal.         Judgment: Judgment normal.         Assessment / Plan:   Husam Vargas was seen today for annual exam, back pain, fatigue, hyperlipidemia, hypertension, gout, benign prostatic hypertrophy and erectile dysfunction. Diagnoses and all orders for this visit:    Colon cancer screening  -     Cologuard (For External Results Only); Future  -     CBC Auto Differential; Future  -     Comprehensive Metabolic Panel; Future  -     Hemoglobin A1C; Future  -     TSH without Reflex; Future  -     T4, Free; Future  -     Lipid Panel; Future  -     Vitamin B12 & Folate; Future  -     Vitamin D 25 Hydroxy; Future  -     Psa screening; Future  -     Cancel: Covid-19, Antibody; Future    Annual physical exam  -     amLODIPine-benazepril (LOTREL) 10-20 MG per capsule; One a day  -     Discontinue: valACYclovir (VALTREX) 500 MG tablet; Take 1 tablet by mouth 2 times daily  -     Discontinue: tadalafil (CIALIS) 20 MG tablet; Take 1 tablet by mouth as needed for Erectile Dysfunction  -     CBC Auto Differential; Future  -     Comprehensive Metabolic Panel; Future  -     Hemoglobin A1C; Future  -     TSH without Reflex; Future  -     T4, Free; Future  -     Lipid Panel; Future  -     Vitamin B12 & Folate; Future  -     Vitamin D 25 Hydroxy; Future  -     Psa screening; Future  -     Cancel: Covid-19, Antibody; Future  -     valACYclovir (VALTREX) 500 MG tablet; Take 1 tablet by mouth 2 times daily    Closed compression fracture of thoracic vertebra, initial encounter (HCC)  -     indomethacin (INDOCIN) 50 MG capsule; Take 1 capsule by mouth 3 times daily as needed for Pain (with meals)  -     CBC Auto Differential; Future  -     Comprehensive Metabolic Panel; Future  -     Hemoglobin A1C; Future  -     TSH without Reflex;  Future  -     T4, Free; Future  -     Lipid Panel; Future  -     Vitamin B12 & Folate; Future  -     Vitamin D 25 Hydroxy; Future  -     Psa screening; Future  -     Cancel: Covid-19, Antibody; Future  -     External Referral To Orthopedic Surgery  -     oxyCODONE-acetaminophen (PERCOCET)  MG per tablet; Take 1 tablet by mouth every 4 hours as needed for Pain for up to 30 days. Closed compression fracture of L1 vertebra, initial encounter (HCC)  -     indomethacin (INDOCIN) 50 MG capsule; Take 1 capsule by mouth 3 times daily as needed for Pain (with meals)  -     CBC Auto Differential; Future  -     Comprehensive Metabolic Panel; Future  -     Hemoglobin A1C; Future  -     TSH without Reflex; Future  -     T4, Free; Future  -     Lipid Panel; Future  -     Vitamin B12 & Folate; Future  -     Vitamin D 25 Hydroxy; Future  -     Psa screening; Future  -     Cancel: Covid-19, Antibody; Future  -     External Referral To Orthopedic Surgery  -     oxyCODONE-acetaminophen (PERCOCET)  MG per tablet; Take 1 tablet by mouth every 4 hours as needed for Pain for up to 30 days. Lumbar disc disease with radiculopathy  -     indomethacin (INDOCIN) 50 MG capsule; Take 1 capsule by mouth 3 times daily as needed for Pain (with meals)  -     CBC Auto Differential; Future  -     Comprehensive Metabolic Panel; Future  -     Hemoglobin A1C; Future  -     TSH without Reflex; Future  -     T4, Free; Future  -     Lipid Panel; Future  -     Vitamin B12 & Folate; Future  -     Vitamin D 25 Hydroxy; Future  -     Psa screening; Future  -     Cancel: Covid-19, Antibody; Future  -     External Referral To Orthopedic Surgery  -     oxyCODONE-acetaminophen (PERCOCET)  MG per tablet; Take 1 tablet by mouth every 4 hours as needed for Pain for up to 30 days. Cigarette nicotine dependence in remission  -     varenicline (CHANTIX CONTINUING MONTH EVERTON) 1 MG tablet;  Take 1 tablet by mouth 2 times daily  -     CBC Auto Differential; Future  -     Comprehensive Metabolic Panel; Future  -     Hemoglobin A1C; Future  -     TSH without Reflex; Future  -     T4, Free; Future  -     Lipid Panel; Future  -     Vitamin B12 & Folate; Future  -     Vitamin D 25 Hydroxy; Future  -     Psa screening; Future  -     Cancel: Covid-19, Antibody; Future    IFG (impaired fasting glucose)  -     CBC Auto Differential; Future  -     Comprehensive Metabolic Panel; Future  -     Hemoglobin A1C; Future  -     TSH without Reflex; Future  -     T4, Free; Future  -     Lipid Panel; Future  -     Vitamin B12 & Folate; Future  -     Vitamin D 25 Hydroxy; Future  -     Psa screening; Future  -     Cancel: Covid-19, Antibody; Future    Gout, unspecified cause, unspecified chronicity, unspecified site  -     URIC ACID; Future    Peanut allergy  -     EPINEPHrine (EPIPEN 2-EVERTON) 0.3 MG/0.3ML SOAJ injection; Inject 0.3 mLs into the muscle once for 1 dose Use as directed for allergic reaction    Benign prostatic hyperplasia with weak urinary stream  -     Psa screening; Future  -     tadalafil (CIALIS) 20 MG tablet; Take 1 tablet by mouth as needed for Erectile Dysfunction  -     finasteride (PROSCAR) 5 MG tablet; Take 1 tablet by mouth daily    Erectile dysfunction, unspecified erectile dysfunction type  -     Psa screening; Future  -     tadalafil (CIALIS) 20 MG tablet; Take 1 tablet by mouth as needed for Erectile Dysfunction  -     finasteride (PROSCAR) 5 MG tablet; Take 1 tablet by mouth daily      Controlled Substance Monitoring:    Acute and Chronic Pain Monitoring:   RX Monitoring 10/17/2020   Periodic Controlled Substance Monitoring Possible medication side effects, risk of tolerance/dependence & alternative treatments discussed. ;No signs of potential drug abuse or diversion identified.;Obtaining appropriate analgesic effect of treatment. Chronic Pain > 50 MEDD -         Reviewed healthmaintenance report. Patient is aware of deficiencies and suggested preventative tests.

## 2020-10-19 ENCOUNTER — HOSPITAL ENCOUNTER (OUTPATIENT)
Age: 50
Discharge: HOME | End: 2020-10-21
Payer: COMMERCIAL

## 2020-10-19 LAB
ALBUMIN SERPL-MCNC: 4.8 G/DL (ref 3.5–5.2)
ALP BLD-CCNC: 47 U/L (ref 40–129)
ALT SERPL-CCNC: 22 U/L (ref 0–40)
ANION GAP SERPL CALCULATED.3IONS-SCNC: 16 MMOL/L (ref 7–16)
AST SERPL-CCNC: 21 U/L (ref 0–39)
BASOPHILS ABSOLUTE: 0.06 E9/L (ref 0–0.2)
BASOPHILS RELATIVE PERCENT: 0.7 % (ref 0–2)
BILIRUB SERPL-MCNC: 0.8 MG/DL (ref 0–1.2)
BUN BLDV-MCNC: 11 MG/DL (ref 6–20)
CALCIUM SERPL-MCNC: 9.5 MG/DL (ref 8.6–10.2)
CHLORIDE BLD-SCNC: 103 MMOL/L (ref 98–107)
CHOLESTEROL, TOTAL: 257 MG/DL (ref 0–199)
CO2: 20 MMOL/L (ref 22–29)
CREAT SERPL-MCNC: 0.9 MG/DL (ref 0.7–1.2)
EOSINOPHILS ABSOLUTE: 0.17 E9/L (ref 0.05–0.5)
EOSINOPHILS RELATIVE PERCENT: 2.1 % (ref 0–6)
FOLATE: 18.3 NG/ML (ref 4.8–24.2)
GFR AFRICAN AMERICAN: >60
GFR NON-AFRICAN AMERICAN: >60 ML/MIN/1.73
GLUCOSE BLD-MCNC: 95 MG/DL (ref 74–99)
HBA1C MFR BLD: 5.3 % (ref 4–5.6)
HCT VFR BLD CALC: 45.2 % (ref 37–54)
HDLC SERPL-MCNC: 88 MG/DL
HEMOGLOBIN: 15.2 G/DL (ref 12.5–16.5)
IMMATURE GRANULOCYTES #: 0.04 E9/L
IMMATURE GRANULOCYTES %: 0.5 % (ref 0–5)
LDL CHOLESTEROL CALCULATED: 141 MG/DL (ref 0–99)
LYMPHOCYTES ABSOLUTE: 2.64 E9/L (ref 1.5–4)
LYMPHOCYTES RELATIVE PERCENT: 32.1 % (ref 20–42)
MCH RBC QN AUTO: 33 PG (ref 26–35)
MCHC RBC AUTO-ENTMCNC: 33.6 % (ref 32–34.5)
MCV RBC AUTO: 98.3 FL (ref 80–99.9)
MONOCYTES ABSOLUTE: 0.6 E9/L (ref 0.1–0.95)
MONOCYTES RELATIVE PERCENT: 7.3 % (ref 2–12)
NEUTROPHILS ABSOLUTE: 4.71 E9/L (ref 1.8–7.3)
NEUTROPHILS RELATIVE PERCENT: 57.3 % (ref 43–80)
PDW BLD-RTO: 12.8 FL (ref 11.5–15)
PLATELET # BLD: 370 E9/L (ref 130–450)
PMV BLD AUTO: 9.8 FL (ref 7–12)
POTASSIUM SERPL-SCNC: 3.9 MMOL/L (ref 3.5–5)
PROSTATE SPECIFIC ANTIGEN: 0.51 NG/ML (ref 0–4)
RBC # BLD: 4.6 E12/L (ref 3.8–5.8)
SODIUM BLD-SCNC: 139 MMOL/L (ref 132–146)
T4 FREE: 0.98 NG/DL (ref 0.93–1.7)
TOTAL PROTEIN: 7.7 G/DL (ref 6.4–8.3)
TRIGL SERPL-MCNC: 142 MG/DL (ref 0–149)
TSH SERPL DL<=0.05 MIU/L-ACNC: 1.92 UIU/ML (ref 0.27–4.2)
URIC ACID, SERUM: 7.5 MG/DL (ref 3.4–7)
VITAMIN B-12: 362 PG/ML (ref 211–946)
VITAMIN D 25-HYDROXY: 32 NG/ML (ref 30–100)
VLDLC SERPL CALC-MCNC: 28 MG/DL
WBC # BLD: 8.2 E9/L (ref 4.5–11.5)

## 2020-10-19 PROCEDURE — 82746 ASSAY OF FOLIC ACID SERUM: CPT

## 2020-10-19 PROCEDURE — 36415 COLL VENOUS BLD VENIPUNCTURE: CPT

## 2020-10-19 PROCEDURE — 82306 VITAMIN D 25 HYDROXY: CPT

## 2020-10-19 PROCEDURE — 82607 VITAMIN B-12: CPT

## 2020-10-19 PROCEDURE — 80061 LIPID PANEL: CPT

## 2020-10-19 PROCEDURE — G0103 PSA SCREENING: HCPCS

## 2020-10-19 PROCEDURE — 84443 ASSAY THYROID STIM HORMONE: CPT

## 2020-10-19 PROCEDURE — 84439 ASSAY OF FREE THYROXINE: CPT

## 2020-10-19 PROCEDURE — 84550 ASSAY OF BLOOD/URIC ACID: CPT

## 2020-10-19 PROCEDURE — 85025 COMPLETE CBC W/AUTO DIFF WBC: CPT

## 2020-10-19 PROCEDURE — 83036 HEMOGLOBIN GLYCOSYLATED A1C: CPT

## 2020-10-19 PROCEDURE — 80053 COMPREHEN METABOLIC PANEL: CPT

## 2021-01-12 DIAGNOSIS — Z12.11 COLON CANCER SCREENING: ICD-10-CM

## 2021-04-26 ENCOUNTER — OFFICE VISIT (OUTPATIENT)
Dept: FAMILY MEDICINE CLINIC | Age: 51
End: 2021-04-26
Payer: COMMERCIAL

## 2021-04-26 VITALS
HEIGHT: 72 IN | RESPIRATION RATE: 18 BRPM | BODY MASS INDEX: 31.69 KG/M2 | HEART RATE: 114 BPM | OXYGEN SATURATION: 98 % | WEIGHT: 234 LBS | DIASTOLIC BLOOD PRESSURE: 84 MMHG | SYSTOLIC BLOOD PRESSURE: 138 MMHG

## 2021-04-26 DIAGNOSIS — I10 BENIGN ESSENTIAL HTN: Primary | ICD-10-CM

## 2021-04-26 DIAGNOSIS — M51.16 LUMBAR DISC DISEASE WITH RADICULOPATHY: ICD-10-CM

## 2021-04-26 DIAGNOSIS — S22.000A CLOSED COMPRESSION FRACTURE OF THORACIC VERTEBRA, INITIAL ENCOUNTER (HCC): ICD-10-CM

## 2021-04-26 DIAGNOSIS — N52.9 ERECTILE DYSFUNCTION, UNSPECIFIED ERECTILE DYSFUNCTION TYPE: ICD-10-CM

## 2021-04-26 DIAGNOSIS — R39.12 BENIGN PROSTATIC HYPERPLASIA WITH WEAK URINARY STREAM: ICD-10-CM

## 2021-04-26 DIAGNOSIS — R73.01 IFG (IMPAIRED FASTING GLUCOSE): ICD-10-CM

## 2021-04-26 DIAGNOSIS — N40.1 BENIGN PROSTATIC HYPERPLASIA WITH WEAK URINARY STREAM: ICD-10-CM

## 2021-04-26 DIAGNOSIS — S32.010A CLOSED COMPRESSION FRACTURE OF L1 VERTEBRA, INITIAL ENCOUNTER (HCC): ICD-10-CM

## 2021-04-26 DIAGNOSIS — E78.5 HYPERLIPIDEMIA, UNSPECIFIED HYPERLIPIDEMIA TYPE: ICD-10-CM

## 2021-04-26 PROCEDURE — 99214 OFFICE O/P EST MOD 30 MIN: CPT | Performed by: FAMILY MEDICINE

## 2021-04-26 RX ORDER — FINASTERIDE 5 MG/1
5 TABLET, FILM COATED ORAL DAILY
Qty: 30 TABLET | Refills: 5 | Status: SHIPPED
Start: 2021-04-26 | End: 2022-04-27 | Stop reason: ALTCHOICE

## 2021-04-26 RX ORDER — OXYCODONE AND ACETAMINOPHEN 10; 325 MG/1; MG/1
1 TABLET ORAL EVERY 4 HOURS PRN
Qty: 90 TABLET | Refills: 0 | Status: SHIPPED
Start: 2021-04-26 | End: 2021-10-27 | Stop reason: SDUPTHER

## 2021-04-26 ASSESSMENT — PATIENT HEALTH QUESTIONNAIRE - PHQ9
2. FEELING DOWN, DEPRESSED OR HOPELESS: 0
1. LITTLE INTEREST OR PLEASURE IN DOING THINGS: 0
SUM OF ALL RESPONSES TO PHQ QUESTIONS 1-9: 0

## 2021-04-27 ASSESSMENT — ENCOUNTER SYMPTOMS
NAUSEA: 0
BLOOD IN STOOL: 0
COLOR CHANGE: 0
EYE PAIN: 0
PHOTOPHOBIA: 0
ANAL BLEEDING: 0
VOMITING: 0
SHORTNESS OF BREATH: 0
EYE REDNESS: 0
SORE THROAT: 0
STRIDOR: 0
TROUBLE SWALLOWING: 0
RECTAL PAIN: 0
CONSTIPATION: 0
APNEA: 0
WHEEZING: 0
VOICE CHANGE: 0
CHEST TIGHTNESS: 0
RHINORRHEA: 0
SINUS PRESSURE: 0
DIARRHEA: 0
ABDOMINAL DISTENTION: 0
EYE DISCHARGE: 0
ABDOMINAL PAIN: 0
FACIAL SWELLING: 0
COUGH: 0
EYE ITCHING: 0
BACK PAIN: 1
CHOKING: 0

## 2021-04-27 NOTE — PROGRESS NOTES
Jaquelin Quijano is a 45 y.o. male  . Subjective:      Discussed going to see Dr Jennifer Aguayo. Wants to wait until fall to see him so they can plan possible surgery in late fall or winter. Back Pain  This is a chronic problem. The current episode started more than 1 year ago. The problem occurs daily. The problem has been gradually improving since onset. The pain is present in the lumbar spine and thoracic spine. The quality of the pain is described as aching and burning. The pain radiates to the left knee, left thigh, right knee and right thigh. The pain is at a severity of 6/10. The pain is moderate. The pain is the same all the time. The symptoms are aggravated by bending, position, standing and twisting. Stiffness is present all day. Pertinent negatives include no abdominal pain, chest pain, dysuria, fever, headaches, numbness or weakness. He has tried analgesics, NSAIDs and muscle relaxant for the symptoms. The treatment provided moderate relief. Fatigue  This is a recurrent problem. The current episode started more than 1 month ago. The problem occurs daily. The problem has been waxing and waning. Associated symptoms include arthralgias, fatigue and myalgias. Pertinent negatives include no abdominal pain, chest pain, chills, congestion, coughing, diaphoresis, fever, headaches, joint swelling, nausea, neck pain, numbness, rash, sore throat, vomiting or weakness. Nothing aggravates the symptoms. He has tried nothing for the symptoms. The treatment provided no relief. Hyperlipidemia  This is a chronic problem. The current episode started more than 1 year ago. The problem is resistant. Recent lipid tests were reviewed and are variable. Associated symptoms include myalgias. Pertinent negatives include no chest pain or shortness of breath. Current antihyperlipidemic treatment includes herbal therapy. The current treatment provides moderate improvement of lipids. There are no compliance problems.   Risk factors for coronary artery disease include dyslipidemia, hypertension and male sex. Hypertension  This is a chronic problem. The current episode started more than 1 year ago. The problem has been gradually improving since onset. The problem is resistant. Pertinent negatives include no chest pain, headaches, neck pain, palpitations or shortness of breath. There are no associated agents to hypertension. Risk factors for coronary artery disease include smoking/tobacco exposure, male gender, stress and dyslipidemia. Past treatments include calcium channel blockers and ACE inhibitors. The current treatment provides moderate improvement. There are no compliance problems. Benign Prostatic Hypertrophy  This is a chronic problem. The current episode started more than 1 year ago. The problem has been waxing and waning since onset. Irritative symptoms do not include frequency or urgency. Obstructive symptoms include a slower stream. Pertinent negatives include no chills, dysuria, hematuria, nausea or vomiting. He is sexually active. Nothing aggravates the symptoms. Past treatments include nothing. The treatment provided no relief. Erectile Dysfunction  This is a chronic problem. The current episode started more than 1 year ago. The problem has been waxing and waning since onset. The nature of his difficulty is maintaining erection. He reports no anxiety. Irritative symptoms do not include frequency or urgency. Obstructive symptoms include a slower stream. Pertinent negatives include no chills, dysuria or hematuria. Nothing aggravates the symptoms. Past treatments include nothing. The treatment provided no relief. Risk factors include hypertension and tobacco use. Review of Systems   Constitutional: Positive for fatigue. Negative for activity change, appetite change, chills, diaphoresis, fever and unexpected weight change.    HENT: Negative for congestion, dental problem, drooling, ear discharge, ear pain, facial swelling, hearing loss, mouth sores, nosebleeds, postnasal drip, rhinorrhea, sinus pressure, sneezing, sore throat, tinnitus, trouble swallowing and voice change. Eyes: Negative for photophobia, pain, discharge, redness, itching and visual disturbance. Respiratory: Negative for apnea, cough, choking, chest tightness, shortness of breath, wheezing and stridor. Cardiovascular: Negative for chest pain, palpitations and leg swelling. Gastrointestinal: Negative for abdominal distention, abdominal pain, anal bleeding, blood in stool, constipation, diarrhea, nausea, rectal pain and vomiting. Endocrine: Negative for cold intolerance, heat intolerance, polydipsia, polyphagia and polyuria. Genitourinary: Negative for decreased urine volume, difficulty urinating, discharge, dysuria, enuresis, flank pain, frequency, genital sores, hematuria, penile pain, penile swelling, scrotal swelling, testicular pain and urgency. Musculoskeletal: Positive for arthralgias, back pain, gait problem and myalgias. Negative for joint swelling, neck pain and neck stiffness. Skin: Negative for color change, pallor, rash and wound. Allergic/Immunologic: Negative for environmental allergies, food allergies and immunocompromised state. Neurological: Negative for dizziness, tremors, seizures, syncope, facial asymmetry, speech difficulty, weakness, light-headedness, numbness and headaches. Hematological: Negative for adenopathy. Does not bruise/bleed easily. Psychiatric/Behavioral: Negative for agitation, behavioral problems, confusion, decreased concentration, dysphoric mood, hallucinations, self-injury, sleep disturbance and suicidal ideas. The patient is not nervous/anxious and is not hyperactive.         Past Medical History:   Diagnosis Date    Gout     Hyperlipidemia     Hypertension        Social History     Socioeconomic History    Marital status: Single     Spouse name: Not on file    Number of children: Not on file    Erectile Dysfunction 30 tablet 5    Red Yeast Rice Extract (RED YEAST RICE PO) Take 1 tablet by mouth daily      EPINEPHrine (EPIPEN 2-EVERTON) 0.3 MG/0.3ML SOAJ injection Inject 0.3 mLs into the muscle once for 1 dose Use as directed for allergic reaction 2 each 5     No current facility-administered medications on file prior to visit. Allergies   Allergen Reactions    Peanuts [Peanut Oil]        I have reviewed his allergies, medications, problem list, medical,social and family history and have updated as needed in the electronic medical record. Objective:     Physical Exam  Vitals signs and nursing note reviewed. Constitutional:       General: He is not in acute distress. Appearance: He is well-developed. He is not diaphoretic. HENT:      Head: Normocephalic and atraumatic. Right Ear: External ear normal.      Left Ear: External ear normal.      Nose: Nose normal.      Mouth/Throat:      Pharynx: No oropharyngeal exudate. Eyes:      General: No scleral icterus. Right eye: No discharge. Left eye: No discharge. Conjunctiva/sclera: Conjunctivae normal.      Pupils: Pupils are equal, round, and reactive to light. Neck:      Musculoskeletal: Normal range of motion and neck supple. Thyroid: No thyromegaly. Vascular: No JVD. Trachea: No tracheal deviation. Cardiovascular:      Rate and Rhythm: Normal rate and regular rhythm. Heart sounds: Normal heart sounds. No murmur. No friction rub. No gallop. Pulmonary:      Effort: Pulmonary effort is normal. No respiratory distress. Breath sounds: Normal breath sounds. No stridor. No wheezing or rales. Chest:      Chest wall: No tenderness. Abdominal:      General: Bowel sounds are normal. There is no distension. Palpations: Abdomen is soft. There is no mass. Tenderness: There is no abdominal tenderness. There is no guarding or rebound.    Genitourinary:     Comments: Deferred by patient Musculoskeletal:      Comments: Increased spasm L1 to S1 with decreased ROM. + straight leg raising and contralateral straight leg raising tests B/L   Lymphadenopathy:      Cervical: No cervical adenopathy. Skin:     General: Skin is warm and dry. Coloration: Skin is not pale. Findings: No erythema or rash. Neurological:      Mental Status: He is alert and oriented to person, place, and time. Cranial Nerves: No cranial nerve deficit. Motor: No abnormal muscle tone. Coordination: Coordination normal.      Deep Tendon Reflexes: Reflexes are normal and symmetric. Reflexes normal.   Psychiatric:         Behavior: Behavior normal.         Thought Content: Thought content normal.         Judgment: Judgment normal.         Assessment / Plan:   Jose Yanez was seen today for 6 month follow-up. Diagnoses and all orders for this visit:    Benign essential HTN  -     CBC Auto Differential; Future  -     Comprehensive Metabolic Panel; Future  -     TSH without Reflex; Future  -     Hemoglobin A1C; Future  -     Lipid Panel; Future  -     HEPATITIS C ANTIBODY; Future    Hyperlipidemia, unspecified hyperlipidemia type  -     CBC Auto Differential; Future  -     Comprehensive Metabolic Panel; Future  -     TSH without Reflex; Future  -     Hemoglobin A1C; Future  -     Lipid Panel; Future  -     HEPATITIS C ANTIBODY; Future    IFG (impaired fasting glucose)  -     CBC Auto Differential; Future  -     Comprehensive Metabolic Panel; Future  -     TSH without Reflex; Future  -     Hemoglobin A1C; Future  -     Lipid Panel; Future  -     HEPATITIS C ANTIBODY; Future    Closed compression fracture of L1 vertebra, initial encounter (HCC)  -     CBC Auto Differential; Future  -     Comprehensive Metabolic Panel; Future  -     TSH without Reflex; Future  -     Hemoglobin A1C; Future  -     Lipid Panel; Future  -     HEPATITIS C ANTIBODY; Future  -     oxyCODONE-acetaminophen (PERCOCET)  MG per tablet;  Take 1 tablet by mouth every 4 hours as needed for Pain for up to 30 days. Lumbar disc disease with radiculopathy  -     CBC Auto Differential; Future  -     Comprehensive Metabolic Panel; Future  -     TSH without Reflex; Future  -     Hemoglobin A1C; Future  -     Lipid Panel; Future  -     HEPATITIS C ANTIBODY; Future  -     oxyCODONE-acetaminophen (PERCOCET)  MG per tablet; Take 1 tablet by mouth every 4 hours as needed for Pain for up to 30 days. Closed compression fracture of thoracic vertebra, initial encounter ([facility])  -     oxyCODONE-acetaminophen (PERCOCET)  MG per tablet; Take 1 tablet by mouth every 4 hours as needed for Pain for up to 30 days. Benign prostatic hyperplasia with weak urinary stream  -     finasteride (PROSCAR) 5 MG tablet; Take 1 tablet by mouth daily    Erectile dysfunction, unspecified erectile dysfunction type  -     finasteride (PROSCAR) 5 MG tablet; Take 1 tablet by mouth daily      Controlled Substance Monitoring:    Acute and Chronic Pain Monitoring:   RX Monitoring 4/27/2021   Periodic Controlled Substance Monitoring Possible medication side effects, risk of tolerance/dependence & alternative treatments discussed. ;No signs of potential drug abuse or diversion identified.;Obtaining appropriate analgesic effect of treatment. Chronic Pain > 50 MEDD -       Reviewed healthmaintenance report. Patient is aware of deficiencies and suggested preventative tests.

## 2021-10-21 DIAGNOSIS — I10 BENIGN ESSENTIAL HTN: ICD-10-CM

## 2021-10-21 DIAGNOSIS — R73.01 IFG (IMPAIRED FASTING GLUCOSE): ICD-10-CM

## 2021-10-21 DIAGNOSIS — E78.5 HYPERLIPIDEMIA, UNSPECIFIED HYPERLIPIDEMIA TYPE: ICD-10-CM

## 2021-10-21 DIAGNOSIS — M51.16 LUMBAR DISC DISEASE WITH RADICULOPATHY: ICD-10-CM

## 2021-10-21 DIAGNOSIS — S32.010A CLOSED COMPRESSION FRACTURE OF L1 VERTEBRA, INITIAL ENCOUNTER (HCC): ICD-10-CM

## 2021-10-21 LAB
ALBUMIN SERPL-MCNC: 4.7 G/DL (ref 3.5–5.2)
ALP BLD-CCNC: 47 U/L (ref 40–129)
ALT SERPL-CCNC: 14 U/L (ref 0–40)
ANION GAP SERPL CALCULATED.3IONS-SCNC: 16 MMOL/L (ref 7–16)
AST SERPL-CCNC: 16 U/L (ref 0–39)
BASOPHILS ABSOLUTE: 0.07 E9/L (ref 0–0.2)
BASOPHILS RELATIVE PERCENT: 0.9 % (ref 0–2)
BILIRUB SERPL-MCNC: 0.7 MG/DL (ref 0–1.2)
BUN BLDV-MCNC: 14 MG/DL (ref 6–20)
CALCIUM SERPL-MCNC: 9.3 MG/DL (ref 8.6–10.2)
CHLORIDE BLD-SCNC: 99 MMOL/L (ref 98–107)
CHOLESTEROL, TOTAL: 276 MG/DL (ref 0–199)
CO2: 22 MMOL/L (ref 22–29)
CREAT SERPL-MCNC: 0.9 MG/DL (ref 0.7–1.2)
EOSINOPHILS ABSOLUTE: 0.16 E9/L (ref 0.05–0.5)
EOSINOPHILS RELATIVE PERCENT: 2 % (ref 0–6)
GFR AFRICAN AMERICAN: >60
GFR NON-AFRICAN AMERICAN: >60 ML/MIN/1.73
GLUCOSE BLD-MCNC: 102 MG/DL (ref 74–99)
HBA1C MFR BLD: 5.3 % (ref 4–5.6)
HCT VFR BLD CALC: 42.2 % (ref 37–54)
HDLC SERPL-MCNC: 82 MG/DL
HEMOGLOBIN: 14.2 G/DL (ref 12.5–16.5)
IMMATURE GRANULOCYTES #: 0.04 E9/L
IMMATURE GRANULOCYTES %: 0.5 % (ref 0–5)
LDL CHOLESTEROL CALCULATED: 171 MG/DL (ref 0–99)
LYMPHOCYTES ABSOLUTE: 2.84 E9/L (ref 1.5–4)
LYMPHOCYTES RELATIVE PERCENT: 35.1 % (ref 20–42)
MCH RBC QN AUTO: 33.3 PG (ref 26–35)
MCHC RBC AUTO-ENTMCNC: 33.6 % (ref 32–34.5)
MCV RBC AUTO: 99.1 FL (ref 80–99.9)
MONOCYTES ABSOLUTE: 0.56 E9/L (ref 0.1–0.95)
MONOCYTES RELATIVE PERCENT: 6.9 % (ref 2–12)
NEUTROPHILS ABSOLUTE: 4.41 E9/L (ref 1.8–7.3)
NEUTROPHILS RELATIVE PERCENT: 54.6 % (ref 43–80)
PDW BLD-RTO: 12.5 FL (ref 11.5–15)
PLATELET # BLD: 333 E9/L (ref 130–450)
PMV BLD AUTO: 9.3 FL (ref 7–12)
POTASSIUM SERPL-SCNC: 4.6 MMOL/L (ref 3.5–5)
RBC # BLD: 4.26 E12/L (ref 3.8–5.8)
SODIUM BLD-SCNC: 137 MMOL/L (ref 132–146)
TOTAL PROTEIN: 6.7 G/DL (ref 6.4–8.3)
TRIGL SERPL-MCNC: 116 MG/DL (ref 0–149)
TSH SERPL DL<=0.05 MIU/L-ACNC: 0.78 UIU/ML (ref 0.27–4.2)
VLDLC SERPL CALC-MCNC: 23 MG/DL
WBC # BLD: 8.1 E9/L (ref 4.5–11.5)

## 2021-10-22 LAB — HEPATITIS C ANTIBODY INTERPRETATION: NORMAL

## 2021-10-27 ENCOUNTER — OFFICE VISIT (OUTPATIENT)
Dept: FAMILY MEDICINE CLINIC | Age: 51
End: 2021-10-27
Payer: COMMERCIAL

## 2021-10-27 VITALS
BODY MASS INDEX: 32.23 KG/M2 | RESPIRATION RATE: 18 BRPM | HEIGHT: 72 IN | WEIGHT: 238 LBS | SYSTOLIC BLOOD PRESSURE: 124 MMHG | HEART RATE: 102 BPM | DIASTOLIC BLOOD PRESSURE: 84 MMHG | OXYGEN SATURATION: 99 %

## 2021-10-27 DIAGNOSIS — R53.82 CHRONIC FATIGUE: ICD-10-CM

## 2021-10-27 DIAGNOSIS — Z00.00 ANNUAL PHYSICAL EXAM: ICD-10-CM

## 2021-10-27 DIAGNOSIS — N40.1 BENIGN PROSTATIC HYPERPLASIA WITH WEAK URINARY STREAM: ICD-10-CM

## 2021-10-27 DIAGNOSIS — M51.16 LUMBAR DISC DISEASE WITH RADICULOPATHY: Primary | ICD-10-CM

## 2021-10-27 DIAGNOSIS — F17.213 CIGARETTE NICOTINE DEPENDENCE WITH WITHDRAWAL: ICD-10-CM

## 2021-10-27 DIAGNOSIS — R73.01 IFG (IMPAIRED FASTING GLUCOSE): ICD-10-CM

## 2021-10-27 DIAGNOSIS — M10.09 ACUTE IDIOPATHIC GOUT OF MULTIPLE SITES: ICD-10-CM

## 2021-10-27 DIAGNOSIS — R39.12 BENIGN PROSTATIC HYPERPLASIA WITH WEAK URINARY STREAM: ICD-10-CM

## 2021-10-27 DIAGNOSIS — E78.2 MIXED HYPERLIPIDEMIA: ICD-10-CM

## 2021-10-27 DIAGNOSIS — N52.9 ERECTILE DYSFUNCTION, UNSPECIFIED ERECTILE DYSFUNCTION TYPE: ICD-10-CM

## 2021-10-27 DIAGNOSIS — S32.010A CLOSED COMPRESSION FRACTURE OF L1 VERTEBRA, INITIAL ENCOUNTER (HCC): ICD-10-CM

## 2021-10-27 DIAGNOSIS — S22.000A CLOSED COMPRESSION FRACTURE OF THORACIC VERTEBRA, INITIAL ENCOUNTER (HCC): ICD-10-CM

## 2021-10-27 DIAGNOSIS — Z23 NEED FOR SHINGLES VACCINE: ICD-10-CM

## 2021-10-27 PROCEDURE — 99214 OFFICE O/P EST MOD 30 MIN: CPT | Performed by: FAMILY MEDICINE

## 2021-10-27 RX ORDER — ZOSTER VACCINE RECOMBINANT, ADJUVANTED 50 MCG/0.5
0.5 KIT INTRAMUSCULAR SEE ADMIN INSTRUCTIONS
Qty: 0.5 ML | Refills: 0 | Status: SHIPPED | OUTPATIENT
Start: 2021-10-27 | End: 2022-04-25

## 2021-10-27 RX ORDER — INDOMETHACIN 50 MG/1
50 CAPSULE ORAL 3 TIMES DAILY PRN
Qty: 90 CAPSULE | Refills: 4 | Status: SHIPPED | OUTPATIENT
Start: 2021-10-27

## 2021-10-27 RX ORDER — TADALAFIL 20 MG/1
20 TABLET ORAL PRN
Qty: 30 TABLET | Refills: 5 | Status: SHIPPED | OUTPATIENT
Start: 2021-10-27

## 2021-10-27 RX ORDER — AMLODIPINE BESYLATE AND BENAZEPRIL HYDROCHLORIDE 10; 20 MG/1; MG/1
CAPSULE ORAL
Qty: 90 CAPSULE | Refills: 4 | Status: SHIPPED
Start: 2021-10-27 | End: 2022-01-06

## 2021-10-27 RX ORDER — VARENICLINE TARTRATE 1 MG/1
1 TABLET, FILM COATED ORAL 2 TIMES DAILY
Qty: 60 TABLET | Refills: 5 | Status: SHIPPED
Start: 2021-10-27 | End: 2022-04-27 | Stop reason: SDUPTHER

## 2021-10-27 RX ORDER — OXYCODONE AND ACETAMINOPHEN 10; 325 MG/1; MG/1
1 TABLET ORAL EVERY 4 HOURS PRN
Qty: 90 TABLET | Refills: 0 | Status: SHIPPED | OUTPATIENT
Start: 2021-10-27 | End: 2022-04-27 | Stop reason: SDUPTHER

## 2021-10-27 SDOH — ECONOMIC STABILITY: FOOD INSECURITY: WITHIN THE PAST 12 MONTHS, YOU WORRIED THAT YOUR FOOD WOULD RUN OUT BEFORE YOU GOT MONEY TO BUY MORE.: NEVER TRUE

## 2021-10-27 SDOH — ECONOMIC STABILITY: FOOD INSECURITY: WITHIN THE PAST 12 MONTHS, THE FOOD YOU BOUGHT JUST DIDN'T LAST AND YOU DIDN'T HAVE MONEY TO GET MORE.: NEVER TRUE

## 2021-10-27 ASSESSMENT — SOCIAL DETERMINANTS OF HEALTH (SDOH): HOW HARD IS IT FOR YOU TO PAY FOR THE VERY BASICS LIKE FOOD, HOUSING, MEDICAL CARE, AND HEATING?: NOT HARD AT ALL

## 2021-10-27 NOTE — PROGRESS NOTES
Corby Cochran is a 45 y.o. male  . Subjective:      Discussed lipids . Discussed red yeast rice and coenzyme Q 10. Macular degeneration shots monthly. Patient has been having a lot of back pain. We discussed finally going to be evaluated for surgery by DR Adelfo Restrepo. Review of Systems   Constitutional: Positive for fatigue. Negative for activity change, appetite change, chills, diaphoresis, fever and unexpected weight change. HENT: Negative for congestion, dental problem, drooling, ear discharge, ear pain, facial swelling, hearing loss, mouth sores, nosebleeds, postnasal drip, rhinorrhea, sinus pressure, sneezing, sore throat, tinnitus, trouble swallowing and voice change. Eyes: Negative for photophobia, pain, discharge, redness, itching and visual disturbance. Respiratory: Negative for apnea, cough, choking, chest tightness, shortness of breath, wheezing and stridor. Cardiovascular: Negative for chest pain, palpitations and leg swelling. Gastrointestinal: Negative for abdominal distention, abdominal pain, anal bleeding, blood in stool, constipation, diarrhea, nausea, rectal pain and vomiting. Endocrine: Negative for cold intolerance, heat intolerance, polydipsia, polyphagia and polyuria. Genitourinary: Negative for decreased urine volume, difficulty urinating, discharge, dysuria, enuresis, flank pain, frequency, genital sores, hematuria, penile pain, penile swelling, scrotal swelling, testicular pain and urgency. Musculoskeletal: Negative for arthralgias, back pain, gait problem, joint swelling, myalgias, neck pain and neck stiffness. Skin: Negative for color change, pallor, rash and wound. Allergic/Immunologic: Negative for environmental allergies, food allergies and immunocompromised state. Neurological: Negative for dizziness, tremors, seizures, syncope, facial asymmetry, speech difficulty, weakness, light-headedness, numbness and headaches. Hematological: Negative for adenopathy. Does not bruise/bleed easily. Psychiatric/Behavioral: Negative for agitation, behavioral problems, confusion, decreased concentration, dysphoric mood, hallucinations, self-injury, sleep disturbance and suicidal ideas. The patient is not nervous/anxious and is not hyperactive. Past Medical History:   Diagnosis Date    Gout     Hyperlipidemia     Hypertension        Social History     Socioeconomic History    Marital status: Single     Spouse name: Not on file    Number of children: Not on file    Years of education: Not on file    Highest education level: Not on file   Occupational History    Not on file   Tobacco Use    Smoking status: Current Every Day Smoker     Packs/day: 1.50    Smokeless tobacco: Never Used   Substance and Sexual Activity    Alcohol use: Yes     Alcohol/week: 0.0 standard drinks     Comment: social    Drug use: Not on file    Sexual activity: Not on file   Other Topics Concern    Not on file   Social History Narrative    Not on file     Social Determinants of Health     Financial Resource Strain: Low Risk     Difficulty of Paying Living Expenses: Not hard at all   Food Insecurity: No Food Insecurity    Worried About Running Out of Food in the Last Year: Never true    [address] in the Last Year: Never true   Transportation Needs:     Lack of Transportation (Medical):      Lack of Transportation (Non-Medical):    Physical Activity:     Days of Exercise per Week:     Minutes of Exercise per Session:    Stress:     Feeling of Stress :    Social Connections:     Frequency of Communication with Friends and Family:     Frequency of Social Gatherings with Friends and Family:     Attends [employer] Services:     Active Member of Clubs or Organizations:     Attends Club or Organization Meetings:     Marital Status:    Intimate Partner Violence:     Fear of Current or Ex-Partner:     Emotionally Abused:     Physically Abused:     Sexually Abused:        Family History   Problem Relation Age of Onset    Cancer Father         leukomia       Current Outpatient Medications on File Prior to Visit   Medication Sig Dispense Refill    finasteride (PROSCAR) 5 MG tablet Take 1 tablet by mouth daily 30 tablet 5    amLODIPine-benazepril (LOTREL) 10-20 MG per capsule One a day 90 capsule 4    indomethacin (INDOCIN) 50 MG capsule Take 1 capsule by mouth 3 times daily as needed for Pain (with meals) 90 capsule 4    varenicline (CHANTIX CONTINUING MONTH EVERTON) 1 MG tablet Take 1 tablet by mouth 2 times daily 60 tablet 5    valACYclovir (VALTREX) 500 MG tablet Take 1 tablet by mouth 2 times daily 60 tablet 5    tadalafil (CIALIS) 20 MG tablet Take 1 tablet by mouth as needed for Erectile Dysfunction 30 tablet 5    Red Yeast Rice Extract (RED YEAST RICE PO) Take 1 tablet by mouth daily      EPINEPHrine (EPIPEN 2-EVEROTN) 0.3 MG/0.3ML SOAJ injection Inject 0.3 mLs into the muscle once for 1 dose Use as directed for allergic reaction 2 each 5     No current facility-administered medications on file prior to visit. Allergies   Allergen Reactions    Peanuts [Peanut Oil]        I have reviewed his allergies, medications, problem list, medical,social and family history and have updated as needed in the electronic medical record. Objective:     Physical Exam  Vitals and nursing note reviewed. Constitutional:       General: He is not in acute distress. Appearance: He is well-developed. He is not diaphoretic. HENT:      Head: Normocephalic and atraumatic. Right Ear: External ear normal.      Left Ear: External ear normal.      Nose: Nose normal.      Mouth/Throat:      Pharynx: No oropharyngeal exudate. Eyes:      General: No scleral icterus. Right eye: No discharge. Left eye: No discharge. Conjunctiva/sclera: Conjunctivae normal.      Pupils: Pupils are equal, round, and reactive to light. Neck:      Thyroid: No thyromegaly. Vascular: No JVD. Trachea: No tracheal deviation. Cardiovascular:      Rate and Rhythm: Normal rate and regular rhythm. Heart sounds: Normal heart sounds. No murmur heard. No friction rub. No gallop. Pulmonary:      Effort: Pulmonary effort is normal. No respiratory distress. Breath sounds: Normal breath sounds. No stridor. No wheezing or rales. Chest:      Chest wall: No tenderness. Abdominal:      General: Bowel sounds are normal. There is no distension. Palpations: Abdomen is soft. There is no mass. Tenderness: There is no abdominal tenderness. There is no guarding or rebound. Genitourinary:     Comments: Deferred by patient  Musculoskeletal:      Cervical back: Normal range of motion and neck supple. Comments: Increased spasm L1 to S1 with decreased ROM. + straight leg raising and contralateral straight leg raising tests B/L   Lymphadenopathy:      Cervical: No cervical adenopathy. Skin:     General: Skin is warm and dry. Coloration: Skin is not pale. Findings: No erythema or rash. Neurological:      Mental Status: He is alert and oriented to person, place, and time. Cranial Nerves: No cranial nerve deficit. Motor: No abnormal muscle tone. Coordination: Coordination normal.      Deep Tendon Reflexes: Reflexes are normal and symmetric. Reflexes normal.   Psychiatric:         Behavior: Behavior normal.         Thought Content: Thought content normal.         Judgment: Judgment normal.         Assessment / Plan:   Tonja Salgado was seen today for 6 month follow-up. Diagnoses and all orders for this visit:    Lumbar disc disease with radiculopathy  -     External Referral To Orthopedic Surgery  -     indomethacin (INDOCIN) 50 MG capsule; Take 1 capsule by mouth 3 times daily as needed for Pain (with meals)  -     oxyCODONE-acetaminophen (PERCOCET)  MG per tablet; Take 1 tablet by mouth every 4 hours as needed for Pain for up to 30 days.   -     CBC Auto Differential; Future  -     Comprehensive Metabolic Panel; Future  -     MRI LUMBAR SPINE WO CONTRAST; Future    Mixed hyperlipidemia  -     CBC Auto Differential; Future  -     Comprehensive Metabolic Panel; Future  -     TSH without Reflex; Future  -     Lipid Panel; Future    Need for shingles vaccine  -     zoster recombinant adjuvanted vaccine [facility]) 50 MCG/0.5ML SUSR injection; Inject 0.5 mLs into the muscle See Admin Instructions 1 dose now and repeat in 2-6 months  -     CBC Auto Differential; Future  -     Comprehensive Metabolic Panel; Future    Cigarette nicotine dependence with withdrawal  -     varenicline (CHANTIX) 1 MG tablet; Take 1 tablet by mouth 2 times daily  -     CBC Auto Differential; Future  -     Comprehensive Metabolic Panel; Future    Annual physical exam  -     amLODIPine-benazepril (LOTREL) 10-20 MG per capsule; One a day  -     CBC Auto Differential; Future  -     Comprehensive Metabolic Panel; Future    Closed compression fracture of thoracic vertebra, initial encounter (HCC)  -     oxyCODONE-acetaminophen (PERCOCET)  MG per tablet; Take 1 tablet by mouth every 4 hours as needed for Pain for up to 30 days. -     CBC Auto Differential; Future  -     Comprehensive Metabolic Panel; Future    Closed compression fracture of L1 vertebra, initial encounter (HCC)  -     oxyCODONE-acetaminophen (PERCOCET)  MG per tablet; Take 1 tablet by mouth every 4 hours as needed for Pain for up to 30 days. -     CBC Auto Differential; Future  -     Comprehensive Metabolic Panel; Future    Acute idiopathic gout of multiple sites  -     indomethacin (INDOCIN) 50 MG capsule; Take 1 capsule by mouth 3 times daily as needed for Pain (with meals)  -     CBC Auto Differential; Future  -     Comprehensive Metabolic Panel; Future    Benign prostatic hyperplasia with weak urinary stream  -     tadalafil (CIALIS) 20 MG tablet;  Take 1 tablet by mouth as needed for Erectile Dysfunction  -     CBC Auto Differential; Future  -     Comprehensive Metabolic Panel; Future    Erectile dysfunction, unspecified erectile dysfunction type  -     tadalafil (CIALIS) 20 MG tablet; Take 1 tablet by mouth as needed for Erectile Dysfunction  -     CBC Auto Differential; Future  -     Comprehensive Metabolic Panel; Future    IFG (impaired fasting glucose)  -     CBC Auto Differential; Future  -     Comprehensive Metabolic Panel; Future  -     Hemoglobin A1C; Future    Chronic fatigue  -     Vitamin B12 & Folate; Future        Reviewed healthmaintenance report. Patient is aware of deficiencies and suggested preventative tests.

## 2021-11-03 ASSESSMENT — ENCOUNTER SYMPTOMS
WHEEZING: 0
EYE DISCHARGE: 0
STRIDOR: 0
SORE THROAT: 0
RECTAL PAIN: 0
RHINORRHEA: 0
PHOTOPHOBIA: 0
BACK PAIN: 0
CHOKING: 0
COLOR CHANGE: 0
CHEST TIGHTNESS: 0
APNEA: 0
ABDOMINAL DISTENTION: 0
SHORTNESS OF BREATH: 0
COUGH: 0
ANAL BLEEDING: 0
NAUSEA: 0
FACIAL SWELLING: 0
ABDOMINAL PAIN: 0
BLOOD IN STOOL: 0
VOICE CHANGE: 0
EYE PAIN: 0
DIARRHEA: 0
CONSTIPATION: 0
EYE REDNESS: 0
TROUBLE SWALLOWING: 0
VOMITING: 0
SINUS PRESSURE: 0
EYE ITCHING: 0

## 2022-01-06 DIAGNOSIS — Z00.00 ANNUAL PHYSICAL EXAM: ICD-10-CM

## 2022-01-06 RX ORDER — AMLODIPINE BESYLATE AND BENAZEPRIL HYDROCHLORIDE 10; 20 MG/1; MG/1
CAPSULE ORAL
Qty: 90 CAPSULE | Refills: 3 | Status: SHIPPED | OUTPATIENT
Start: 2022-01-06

## 2022-02-17 ENCOUNTER — TELEPHONE (OUTPATIENT)
Dept: FAMILY MEDICINE CLINIC | Age: 52
End: 2022-02-17

## 2022-02-17 NOTE — TELEPHONE ENCOUNTER
Good Afternoon,   I have received denial for mri of lumbar spine for the following reason:  Six weeks of physical therapy. The denial has been scanned into the media tab for your review. A PEER TO PEER can be done by calling [phone] and reference the following  Lina Bridger  Member #: [id number]  [id number] [address]  YOB: 1970    Patient is scheduled for 2/23/22, please advise. Thank you.

## 2022-02-19 NOTE — TELEPHONE ENCOUNTER
Let him know that his insurance denied MRI due to need for therapy. He may have more luck once he sees specialist. They can usually get them covered.

## 2022-02-21 ENCOUNTER — TELEPHONE (OUTPATIENT)
Dept: FAMILY MEDICINE CLINIC | Age: 52
End: 2022-02-21

## 2022-02-21 NOTE — TELEPHONE ENCOUNTER
----- Message from Nancy Barrow sent at 2/21/2022  9:17 AM EST -----  Subject: Message to Provider    QUESTIONS  Information for Provider? Patient's MRI was canceled because insurance   denied to him not having 6 weeks or 6 months of Physical Therapy. Please   call back to further assist   ---------------------------------------------------------------------------  --------------  CALL BACK INFO  What is the best way for the office to contact you? OK to leave message on   voicemail  Preferred Call Back Phone Number? [phone]  ---------------------------------------------------------------------------  --------------  SCRIPT ANSWERS  Relationship to Patient?  Self

## 2022-02-22 NOTE — TELEPHONE ENCOUNTER
I gave a message to people that prior authorize to let him know it was denied. We can set up with therapy or have him wait until he sees back surgeon.

## 2022-02-28 NOTE — TELEPHONE ENCOUNTER
Called pt and he said he's looking into a few places and will call back once he finds out who accepts his insurance.

## 2022-03-01 DIAGNOSIS — M51.16 LUMBAR DISC DISEASE WITH RADICULOPATHY: Primary | ICD-10-CM

## 2022-04-22 DIAGNOSIS — M10.09 ACUTE IDIOPATHIC GOUT OF MULTIPLE SITES: ICD-10-CM

## 2022-04-22 DIAGNOSIS — S32.010A CLOSED COMPRESSION FRACTURE OF L1 VERTEBRA, INITIAL ENCOUNTER (HCC): ICD-10-CM

## 2022-04-22 DIAGNOSIS — E78.2 MIXED HYPERLIPIDEMIA: ICD-10-CM

## 2022-04-22 DIAGNOSIS — R53.82 CHRONIC FATIGUE: ICD-10-CM

## 2022-04-22 DIAGNOSIS — Z23 NEED FOR SHINGLES VACCINE: ICD-10-CM

## 2022-04-22 DIAGNOSIS — Z00.00 ANNUAL PHYSICAL EXAM: ICD-10-CM

## 2022-04-22 DIAGNOSIS — M51.16 LUMBAR DISC DISEASE WITH RADICULOPATHY: ICD-10-CM

## 2022-04-22 DIAGNOSIS — R73.01 IFG (IMPAIRED FASTING GLUCOSE): ICD-10-CM

## 2022-04-22 DIAGNOSIS — R39.12 BENIGN PROSTATIC HYPERPLASIA WITH WEAK URINARY STREAM: ICD-10-CM

## 2022-04-22 DIAGNOSIS — S22.000A CLOSED COMPRESSION FRACTURE OF THORACIC VERTEBRA, INITIAL ENCOUNTER (HCC): ICD-10-CM

## 2022-04-22 DIAGNOSIS — F17.213 CIGARETTE NICOTINE DEPENDENCE WITH WITHDRAWAL: ICD-10-CM

## 2022-04-22 DIAGNOSIS — N52.9 ERECTILE DYSFUNCTION, UNSPECIFIED ERECTILE DYSFUNCTION TYPE: ICD-10-CM

## 2022-04-22 DIAGNOSIS — N40.1 BENIGN PROSTATIC HYPERPLASIA WITH WEAK URINARY STREAM: ICD-10-CM

## 2022-04-22 LAB
ALBUMIN SERPL-MCNC: 4.7 G/DL (ref 3.5–5.2)
ALP BLD-CCNC: 46 U/L (ref 40–129)
ALT SERPL-CCNC: 19 U/L (ref 0–40)
ANION GAP SERPL CALCULATED.3IONS-SCNC: 15 MMOL/L (ref 7–16)
AST SERPL-CCNC: 17 U/L (ref 0–39)
BASOPHILS ABSOLUTE: 0.06 E9/L (ref 0–0.2)
BASOPHILS RELATIVE PERCENT: 0.8 % (ref 0–2)
BILIRUB SERPL-MCNC: 0.5 MG/DL (ref 0–1.2)
BUN BLDV-MCNC: 11 MG/DL (ref 6–20)
CALCIUM SERPL-MCNC: 9.2 MG/DL (ref 8.6–10.2)
CHLORIDE BLD-SCNC: 104 MMOL/L (ref 98–107)
CHOLESTEROL, TOTAL: 258 MG/DL (ref 0–199)
CO2: 22 MMOL/L (ref 22–29)
CREAT SERPL-MCNC: 0.9 MG/DL (ref 0.7–1.2)
EOSINOPHILS ABSOLUTE: 0.3 E9/L (ref 0.05–0.5)
EOSINOPHILS RELATIVE PERCENT: 4.1 % (ref 0–6)
FOLATE: >20 NG/ML (ref 4.8–24.2)
GFR AFRICAN AMERICAN: >60
GFR NON-AFRICAN AMERICAN: >60 ML/MIN/1.73
GLUCOSE BLD-MCNC: 100 MG/DL (ref 74–99)
HBA1C MFR BLD: 5.2 % (ref 4–5.6)
HCT VFR BLD CALC: 42.6 % (ref 37–54)
HDLC SERPL-MCNC: 79 MG/DL
HEMOGLOBIN: 14 G/DL (ref 12.5–16.5)
IMMATURE GRANULOCYTES #: 0.06 E9/L
IMMATURE GRANULOCYTES %: 0.8 % (ref 0–5)
LDL CHOLESTEROL CALCULATED: 165 MG/DL (ref 0–99)
LYMPHOCYTES ABSOLUTE: 2.63 E9/L (ref 1.5–4)
LYMPHOCYTES RELATIVE PERCENT: 35.8 % (ref 20–42)
MCH RBC QN AUTO: 32.4 PG (ref 26–35)
MCHC RBC AUTO-ENTMCNC: 32.9 % (ref 32–34.5)
MCV RBC AUTO: 98.6 FL (ref 80–99.9)
MONOCYTES ABSOLUTE: 0.52 E9/L (ref 0.1–0.95)
MONOCYTES RELATIVE PERCENT: 7.1 % (ref 2–12)
NEUTROPHILS ABSOLUTE: 3.78 E9/L (ref 1.8–7.3)
NEUTROPHILS RELATIVE PERCENT: 51.4 % (ref 43–80)
PDW BLD-RTO: 12.8 FL (ref 11.5–15)
PLATELET # BLD: 337 E9/L (ref 130–450)
PMV BLD AUTO: 9.3 FL (ref 7–12)
POTASSIUM SERPL-SCNC: 4.5 MMOL/L (ref 3.5–5)
RBC # BLD: 4.32 E12/L (ref 3.8–5.8)
SODIUM BLD-SCNC: 141 MMOL/L (ref 132–146)
TOTAL PROTEIN: 7.4 G/DL (ref 6.4–8.3)
TRIGL SERPL-MCNC: 69 MG/DL (ref 0–149)
TSH SERPL DL<=0.05 MIU/L-ACNC: 0.79 UIU/ML (ref 0.27–4.2)
VITAMIN B-12: 428 PG/ML (ref 211–946)
VLDLC SERPL CALC-MCNC: 14 MG/DL
WBC # BLD: 7.4 E9/L (ref 4.5–11.5)

## 2022-04-27 ENCOUNTER — OFFICE VISIT (OUTPATIENT)
Dept: FAMILY MEDICINE CLINIC | Age: 52
End: 2022-04-27
Payer: COMMERCIAL

## 2022-04-27 VITALS
HEIGHT: 72 IN | SYSTOLIC BLOOD PRESSURE: 126 MMHG | RESPIRATION RATE: 18 BRPM | DIASTOLIC BLOOD PRESSURE: 78 MMHG | HEART RATE: 90 BPM | BODY MASS INDEX: 32.37 KG/M2 | OXYGEN SATURATION: 98 % | WEIGHT: 239 LBS

## 2022-04-27 DIAGNOSIS — M51.16 LUMBAR DISC DISEASE WITH RADICULOPATHY: Primary | ICD-10-CM

## 2022-04-27 DIAGNOSIS — S32.010A CLOSED COMPRESSION FRACTURE OF L1 VERTEBRA, INITIAL ENCOUNTER (HCC): ICD-10-CM

## 2022-04-27 DIAGNOSIS — Z98.890 BACK PAIN WITH HISTORY OF SPINAL SURGERY: ICD-10-CM

## 2022-04-27 DIAGNOSIS — E78.2 MIXED HYPERLIPIDEMIA: ICD-10-CM

## 2022-04-27 DIAGNOSIS — S22.000A CLOSED COMPRESSION FRACTURE OF THORACIC VERTEBRA, INITIAL ENCOUNTER (HCC): ICD-10-CM

## 2022-04-27 DIAGNOSIS — N62 GYNECOMASTIA, MALE: ICD-10-CM

## 2022-04-27 DIAGNOSIS — F17.213 CIGARETTE NICOTINE DEPENDENCE WITH WITHDRAWAL: ICD-10-CM

## 2022-04-27 DIAGNOSIS — Z78.9 STATIN INTOLERANCE: ICD-10-CM

## 2022-04-27 DIAGNOSIS — M54.9 BACK PAIN WITH HISTORY OF SPINAL SURGERY: ICD-10-CM

## 2022-04-27 PROCEDURE — 99214 OFFICE O/P EST MOD 30 MIN: CPT | Performed by: FAMILY MEDICINE

## 2022-04-27 RX ORDER — VARENICLINE TARTRATE 1 MG/1
1 TABLET, FILM COATED ORAL 2 TIMES DAILY
Qty: 60 TABLET | Refills: 5 | Status: SHIPPED | OUTPATIENT
Start: 2022-04-27

## 2022-04-27 RX ORDER — OXYCODONE AND ACETAMINOPHEN 10; 325 MG/1; MG/1
1 TABLET ORAL EVERY 4 HOURS PRN
Qty: 90 TABLET | Refills: 0 | Status: SHIPPED
Start: 2022-04-27 | End: 2022-07-27 | Stop reason: SDUPTHER

## 2022-04-27 ASSESSMENT — PATIENT HEALTH QUESTIONNAIRE - PHQ9
2. FEELING DOWN, DEPRESSED OR HOPELESS: 0
SUM OF ALL RESPONSES TO PHQ QUESTIONS 1-9: 0
SUM OF ALL RESPONSES TO PHQ9 QUESTIONS 1 & 2: 0
1. LITTLE INTEREST OR PLEASURE IN DOING THINGS: 0
SUM OF ALL RESPONSES TO PHQ QUESTIONS 1-9: 0

## 2022-04-27 NOTE — PROGRESS NOTES
Meli Mack is a 45 y.o. male  . Subjective:      Patient did 6 weeks of therapy. No improvement. Pain continues to worsen. Dr Mingo Crenshaw wants MRI of Back prior. Will need contrast due to surgery. Will perform at [facility]. Cholesterol discussed . He does not tolerate statin. Did not tolerate RYR. Will start psyllium. We will check another 3 months. But more than likely will want to start a low-dose low potency statin and see if he tolerates it for primary prevention standpoint. Patient understands risks of elevated cholesterol. Patient taking Percocet very very limited basis, patient patient only using when absolutely needs it and prescription of want 90 last multiple months. Hopefully the surgery will be successful the first and we have many years of relatively pain-free. But all starts with the MRI. Extended visit we discussed old records. We discussed physical therapy. She also complains of excess tissue pain and chest/breast area. He states he does not improve when he loses weight has been exercising as much he can with his back pain and pain to lose weight. We discussed an appointment and evaluation for their recommendations by plastic surgery patient agrees. Review of Systems   Constitutional: Positive for fatigue. Negative for activity change, appetite change, chills, diaphoresis, fever and unexpected weight change. HENT: Negative for congestion, dental problem, drooling, ear discharge, ear pain, facial swelling, hearing loss, mouth sores, nosebleeds, postnasal drip, rhinorrhea, sinus pressure, sneezing, sore throat, tinnitus, trouble swallowing and voice change. Eyes: Negative for photophobia, pain, discharge, redness, itching and visual disturbance. Respiratory: Negative for apnea, cough, choking, chest tightness, shortness of breath, wheezing and stridor. Cardiovascular: Negative for chest pain, palpitations and leg swelling.    Gastrointestinal: Negative for abdominal distention, abdominal pain, anal bleeding, blood in stool, constipation, diarrhea, nausea, rectal pain and vomiting. Endocrine: Negative for cold intolerance, heat intolerance, polydipsia, polyphagia and polyuria. Genitourinary: Negative for decreased urine volume, difficulty urinating, dysuria, enuresis, flank pain, frequency, genital sores, hematuria, penile discharge, penile pain, penile swelling, scrotal swelling, testicular pain and urgency. Musculoskeletal: Positive for arthralgias, back pain and gait problem. Negative for joint swelling, myalgias, neck pain and neck stiffness. Skin: Negative for color change, pallor, rash and wound. Gynecomastia   Allergic/Immunologic: Negative for environmental allergies, food allergies and immunocompromised state. Neurological: Negative for dizziness, tremors, seizures, syncope, facial asymmetry, speech difficulty, weakness, light-headedness, numbness and headaches. Hematological: Negative for adenopathy. Does not bruise/bleed easily. Psychiatric/Behavioral: Negative for agitation, behavioral problems, confusion, decreased concentration, dysphoric mood, hallucinations, self-injury, sleep disturbance and suicidal ideas. The patient is not nervous/anxious and is not hyperactive. Past Medical History:   Diagnosis Date    Gout     Hyperlipidemia     Hypertension        Social History     Socioeconomic History    Marital status: Single     Spouse name: Not on file    Number of children: Not on file    Years of education: Not on file    Highest education level: Not on file   Occupational History    Not on file   Tobacco Use    Smoking status: Current Every Day Smoker     Packs/day: 1.50    Smokeless tobacco: Never Used   Substance and Sexual Activity    Alcohol use:  Yes     Alcohol/week: 0.0 standard drinks     Comment: social    Drug use: Not on file    Sexual activity: Not on file   Other Topics Concern    Not on file   Social History Narrative    Not on file     Social Determinants of Health     Financial Resource Strain: Low Risk     Difficulty of Paying Living Expenses: Not hard at all   Food Insecurity: No Food Insecurity    Worried About Running Out of Food in the Last Year: Never true    [address] in the Last Year: Never true   Transportation Needs:     Lack of Transportation (Medical): Not on file    Lack of Transportation (Non-Medical):  Not on file   Physical Activity:     Days of Exercise per Week: Not on file    Minutes of Exercise per Session: Not on file   Stress:     Feeling of Stress : Not on file   Social Connections:     Frequency of Communication with Friends and Family: Not on file    Frequency of Social Gatherings with Friends and Family: Not on file    Attends Denominational Services: Not on file    Active Member of [address] [place] [address] [employer] or Organizations: Not on file    Attends Club or Organization Meetings: Not on file    Marital Status: Not on file   Intimate Partner Violence:     Fear of Current or Ex-Partner: Not on file    Emotionally Abused: Not on file    Physically Abused: Not on file    Sexually Abused: Not on file   Housing Stability:     Unable to Pay for Housing in the Last Year: Not on file    Number of [place] in the Last Year: Not on file    Unstable Housing in the Last Year: Not on file       Family History   Problem Relation Age of Onset    Cancer Father         leukomia       Current Outpatient Medications on File Prior to Visit   Medication Sig Dispense Refill    amLODIPine-benazepril (LOTREL) 10-20 MG per capsule TAKE 1 CAPSULE DAILY 90 capsule 3    varenicline (CHANTIX) 1 MG tablet Take 1 tablet by mouth 2 times daily 60 tablet 5    indomethacin (INDOCIN) 50 MG capsule Take 1 capsule by mouth 3 times daily as needed for Pain (with meals) 90 capsule 4    tadalafil (CIALIS) 20 MG tablet Take 1 tablet by mouth as needed for Erectile Dysfunction 30 tablet 5    valACYclovir (VALTREX) 500 MG tablet Take 1 tablet by mouth 2 times daily 60 tablet 5    Red Yeast Rice Extract (RED YEAST RICE PO) Take 1 tablet by mouth daily      EPINEPHrine (EPIPEN 2-EVERTON) 0.3 MG/0.3ML SOAJ injection Inject 0.3 mLs into the muscle once for 1 dose Use as directed for allergic reaction 2 each 5     No current facility-administered medications on file prior to visit. Allergies   Allergen Reactions    Peanuts [Peanut Oil]        I have reviewed his allergies, medications, problem list, medical,social and family history and have updated as needed in the electronic medical record. Objective:     Physical Exam  Vitals and nursing note reviewed. Constitutional:       General: He is not in acute distress. Appearance: He is well-developed. He is not diaphoretic. HENT:      Head: Normocephalic and atraumatic. Right Ear: External ear normal.      Left Ear: External ear normal.      Nose: Nose normal.      Mouth/Throat:      Pharynx: No oropharyngeal exudate. Eyes:      General: No scleral icterus. Right eye: No discharge. Left eye: No discharge. Conjunctiva/sclera: Conjunctivae normal.      Pupils: Pupils are equal, round, and reactive to light. Neck:      Thyroid: No thyromegaly. Vascular: No JVD. Trachea: No tracheal deviation. Cardiovascular:      Rate and Rhythm: Normal rate and regular rhythm. Heart sounds: Normal heart sounds. No murmur heard. No friction rub. No gallop. Pulmonary:      Effort: Pulmonary effort is normal. No respiratory distress. Breath sounds: Normal breath sounds. No stridor. No wheezing or rales. Chest:      Chest wall: No tenderness. Abdominal:      General: Bowel sounds are normal. There is no distension. Palpations: Abdomen is soft. There is no mass. Tenderness: There is no abdominal tenderness. There is no guarding or rebound.    Genitourinary:     Comments: Deferred by patient  Musculoskeletal:         General: No tenderness. Normal range of motion. Cervical back: Normal range of motion and neck supple. Lymphadenopathy:      Cervical: No cervical adenopathy. Skin:     General: Skin is warm and dry. Coloration: Skin is not pale. Findings: No erythema or rash. Comments: Gynecomastia   Neurological:      Mental Status: He is alert and oriented to person, place, and time. Cranial Nerves: No cranial nerve deficit. Motor: No abnormal muscle tone. Coordination: Coordination normal.      Deep Tendon Reflexes: Reflexes are normal and symmetric. Reflexes normal.   Psychiatric:         Behavior: Behavior normal.         Thought Content: Thought content normal.         Judgment: Judgment normal.         Assessment / Plan:   Felice Yen was seen today for 6 month follow-up. Diagnoses and all orders for this visit:    Lumbar disc disease with radiculopathy  -     MRI LUMBAR SPINE W CONTRAST; Future  -     External Referral To Orthopedic Surgery  -     oxyCODONE-acetaminophen (PERCOCET)  MG per tablet; Take 1 tablet by mouth every 4 hours as needed for Pain for up to 30 days. Cigarette nicotine dependence with withdrawal  -     varenicline (CHANTIX) 1 MG tablet; Take 1 tablet by mouth 2 times daily    Back pain with history of spinal surgery  -     MRI LUMBAR SPINE W CONTRAST; Future  -     External Referral To Orthopedic Surgery  -     oxyCODONE-acetaminophen (PERCOCET)  MG per tablet; Take 1 tablet by mouth every 4 hours as needed for Pain for up to 30 days. Statin intolerance    Mixed hyperlipidemia    Closed compression fracture of thoracic vertebra, initial encounter ([facility])  -     oxyCODONE-acetaminophen (PERCOCET)  MG per tablet; Take 1 tablet by mouth every 4 hours as needed for Pain for up to 30 days. Closed compression fracture of L1 vertebra, initial encounter ([facility])  -     oxyCODONE-acetaminophen (PERCOCET)  MG per tablet;  Take 1 tablet by mouth every 4 hours as needed for Pain for up to 30 days. Gynecomastia, male  -     Kimberly Gutierrez MD, Plastic & Reconstructive Surgery, [address] [facility]    Extended visit 28. Controlled Substance Monitoring:    Acute and Chronic Pain Monitoring:   RX Monitoring 4/28/2022   Periodic Controlled Substance Monitoring Possible medication side effects, risk of tolerance/dependence & alternative treatments discussed. ;No signs of potential drug abuse or diversion identified.;Obtaining appropriate analgesic effect of treatment. Chronic Pain > 50 MEDD -         Reviewed healthmaintenance report. Patient is aware of deficiencies and suggested preventative tests.

## 2022-04-28 DIAGNOSIS — M51.9 LUMBAR DISC DISEASE: Primary | ICD-10-CM

## 2022-04-28 ASSESSMENT — ENCOUNTER SYMPTOMS
PHOTOPHOBIA: 0
EYE ITCHING: 0
EYE DISCHARGE: 0
ABDOMINAL PAIN: 0
APNEA: 0
RHINORRHEA: 0
EYE REDNESS: 0
VOICE CHANGE: 0
ANAL BLEEDING: 0
BLOOD IN STOOL: 0
CONSTIPATION: 0
EYE PAIN: 0
TROUBLE SWALLOWING: 0
CHEST TIGHTNESS: 0
CHOKING: 0
DIARRHEA: 0
VOMITING: 0
NAUSEA: 0
SHORTNESS OF BREATH: 0
STRIDOR: 0
RECTAL PAIN: 0
BACK PAIN: 1
COLOR CHANGE: 0
ROS SKIN COMMENTS: GYNECOMASTIA
SINUS PRESSURE: 0
COUGH: 0
FACIAL SWELLING: 0
SORE THROAT: 0
ABDOMINAL DISTENTION: 0
WHEEZING: 0

## 2022-05-16 DIAGNOSIS — M51.9 LUMBAR DISC DISEASE: ICD-10-CM

## 2022-06-10 ENCOUNTER — OFFICE VISIT (OUTPATIENT)
Dept: SURGERY | Age: 52
End: 2022-06-10
Payer: COMMERCIAL

## 2022-06-10 VITALS
TEMPERATURE: 98.4 F | BODY MASS INDEX: 32.13 KG/M2 | SYSTOLIC BLOOD PRESSURE: 136 MMHG | WEIGHT: 237.2 LBS | DIASTOLIC BLOOD PRESSURE: 84 MMHG | OXYGEN SATURATION: 98 % | HEIGHT: 72 IN | RESPIRATION RATE: 20 BRPM | HEART RATE: 88 BPM

## 2022-06-10 DIAGNOSIS — N62 GYNECOMASTIA: Primary | ICD-10-CM

## 2022-06-10 PROCEDURE — 99203 OFFICE O/P NEW LOW 30 MIN: CPT | Performed by: PHYSICIAN ASSISTANT

## 2022-06-10 NOTE — PROGRESS NOTES
Department of Plastic Surgery - Adult  Attending Consult Note        CHIEF COMPLAINT:  Gynecomastia    History Obtained From:  patient    HISTORY OF PRESENT ILLNESS:                The patient is a 45 y.o. male who presents with gynecomastia. The patient complains of excess bilateral breast tissue for greater than 40 years. Patient states that his breast tissue does not fluctuate throughout the years but has remained enlarged in relation to his upper chest wall and abdomen. Patient states that he first noticed the increase in breast tissue following puberty. He denies any associations with fluctuations in his breast tissue as he states they have been stable over this time. He states his weight has also been stable over the years and has not had fluctuations in the size of his breast tissue related to his overall weight fluctuations. He denies any family history of breast cancer. Patient denies any history of substance abuse including steroid use or heavy alcohol drinking. He states that the larger breast tissue is painful at times with no association in aggravating or alleviating factors he does admit to having a sister with breast cancer. He has never had any laboratory studies or breast imaging completed. He does have chronic back pain which he states he has discussed with his family doctor and his unsure if this can be related to his larger gynecomastia.          Past Medical History:    Past Medical History:   Diagnosis Date    Gout     Hyperlipidemia     Hypertension      Past Surgical History:    Past Surgical History:   Procedure Laterality Date    LUMBAR [address] [facility] SURGERY  2010    OVAL / ROUND WINDOW FISTULA       Current Medications:      Current Outpatient Medications   Medication Sig Dispense Refill    varenicline (CHANTIX) 1 MG tablet Take 1 tablet by mouth 2 times daily 60 tablet 5    amLODIPine-benazepril (LOTREL) 10-20 MG per capsule TAKE 1 CAPSULE DAILY 90 capsule 3    indomethacin (INDOCIN) 50 MG capsule Take 1 capsule by mouth 3 times daily as needed for Pain (with meals) 90 capsule 4    tadalafil (CIALIS) 20 MG tablet Take 1 tablet by mouth as needed for Erectile Dysfunction 30 tablet 5    valACYclovir (VALTREX) 500 MG tablet Take 1 tablet by mouth 2 times daily 60 tablet 5    EPINEPHrine (EPIPEN 2-EVERTON) 0.3 MG/0.3ML SOAJ injection Inject 0.3 mLs into the muscle once for 1 dose Use as directed for allergic reaction 2 each 5    Red Yeast Rice Extract (RED YEAST RICE PO) Take 1 tablet by mouth daily (Patient not taking: Reported on 6/10/2022)       No current facility-administered medications for this visit. Allergies:  Peanuts [peanut oil]    Social History:   Social History     Socioeconomic History    Marital status: Single     Spouse name: Not on file    Number of children: Not on file    Years of education: Not on file    Highest education level: Not on file   Occupational History    Not on file   Tobacco Use    Smoking status: Former Smoker     Types: Pipe    Smokeless tobacco: Never Used    Tobacco comment: pipe occassionally   Vaping Use    Vaping Use: Never used   Substance and Sexual Activity    Alcohol use: Yes     Alcohol/week: 0.0 standard drinks     Comment: social    Drug use: Never    Sexual activity: Not on file   Other Topics Concern    Not on file   Social History Narrative    Not on file     Social Determinants of Health     Financial Resource Strain: Low Risk     Difficulty of Paying Living Expenses: Not hard at all   Food Insecurity: No Food Insecurity    Worried About Running Out of Food in the Last Year: Never true    [address] in the Last Year: Never true   Transportation Needs:     Lack of Transportation (Medical): Not on file    Lack of Transportation (Non-Medical):  Not on file   Physical Activity:     Days of Exercise per Week: Not on file    Minutes of Exercise per Session: Not on file   Stress:     Feeling of Stress : Not on file   Social Connections:     Frequency of Communication with Friends and Family: Not on file    Frequency of Social Gatherings with Friends and Family: Not on file    Attends [employer] Services: Not on file    Active Member of Clubs or Organizations: Not on file    Attends Club or Organization Meetings: Not on file    Marital Status: Not on file   Intimate Partner Violence:     Fear of Current or Ex-Partner: Not on file    Emotionally Abused: Not on file    Physically Abused: Not on file    Sexually Abused: Not on file   Housing Stability:     Unable to Pay for Housing in the Last Year: Not on file    Number of [place] in the Last Year: Not on file    Unstable Housing in the Last Year: Not on file     Family History:   Family History   Problem Relation Age of Onset    Cancer Father         leukomia       REVIEW OF SYSTEMS:    CONSTITUTIONAL:  negative for  fevers, chills, sweats and fatigue  EYES: negative for dipolpia or acute vision loss. RESPIRATORY:  negative for  dry cough, cough with sputum, dyspnea, wheezing and chest pain  CARDIOVASCULAR:  negative for  chest pain, dyspnea, palpitations, syncope  GASTROINTESTINAL:  negative for nausea, vomiting, change in bowel habits, diarrhea, constipation and abdominal pain  EXTREMITIES: negative for edema  MUSCULOSKELETAL: negative for muscle weakness  SKIN: negative for itching or rashes.     BEHAVIOR/PSYCH:  negative for poor appetite, increased appetite, decreased sleep and poor concentration          PHYSICAL EXAM:    VITALS:  /84 (Site: Left Upper Arm, Position: Sitting, Cuff Size: Medium Adult)   Pulse 88   Temp 98.4 °F (36.9 °C) (Infrared)   Resp 20   Ht 6' (1.829 m)   Wt 237 lb 3.2 oz (107.6 kg)   SpO2 98%   BMI 32.17 kg/m²   CONSTITUTIONAL:  awake, alert, cooperative, no apparent distress, and appears stated age  LUNGS:  No increased work of breathing, good air exchange, clear to auscultation bilaterally, no crackles or wheezing  CARDIOVASCULAR:  Normal apical impulse, regular rate and rhythm, normal S1 and S2, no S3 or S4, and no murmur noted  BREAST: Rash is not noted,  no nipple discharge. Mild on palpation breast pain. There are no previous scars  Excess breast adiposity with excess skin. + retroareolar gland hypertrophy. No No masses, No discharge, No axillary LAD. DATA:    Radiology Review: None at this time  Breast Measurements were taken and are noted in the media section. IMPRESSION/RECOMMENDATIONS:      The patient understands that we will need to rule out any undelaying physiologic or endocrine conditions prior to scheduling for surgery. Serum TSH, Free T4, 21 Hydroxy Progesterone, Estradiol, Free Testosterone, BHCG, LH, FSH, Prolactin, Urine Toxicity including Anabolic Steroids. US of bilateral breasts ordered today as well. The perioperative course was discussed with the patient. The risks and benefits, including but not limited to, bleeding, infection, pain, scarring, asymmetry, loss of tissue, unsatisfactory appearance, poor healing, as well as the need for further surgery were discussed with the patient. He would like to proceed with surgery pending his lab work-up. Photos were obtained. Follow Up- Once lab work is completed and reviewed. 2 weeks.     AMBER Baugh

## 2022-07-27 ENCOUNTER — OFFICE VISIT (OUTPATIENT)
Dept: FAMILY MEDICINE CLINIC | Age: 52
End: 2022-07-27
Payer: COMMERCIAL

## 2022-07-27 VITALS
HEART RATE: 96 BPM | DIASTOLIC BLOOD PRESSURE: 80 MMHG | SYSTOLIC BLOOD PRESSURE: 132 MMHG | OXYGEN SATURATION: 98 % | BODY MASS INDEX: 31.97 KG/M2 | RESPIRATION RATE: 18 BRPM | HEIGHT: 72 IN | WEIGHT: 236 LBS

## 2022-07-27 DIAGNOSIS — Z12.5 SCREENING FOR MALIGNANT NEOPLASM OF PROSTATE: ICD-10-CM

## 2022-07-27 DIAGNOSIS — E78.2 MIXED HYPERLIPIDEMIA: ICD-10-CM

## 2022-07-27 DIAGNOSIS — Z78.9 STATIN INTOLERANCE: ICD-10-CM

## 2022-07-27 DIAGNOSIS — N62 GYNECOMASTIA: ICD-10-CM

## 2022-07-27 DIAGNOSIS — M51.16 LUMBAR DISC DISEASE WITH RADICULOPATHY: Primary | ICD-10-CM

## 2022-07-27 DIAGNOSIS — M10.9 GOUT, UNSPECIFIED CAUSE, UNSPECIFIED CHRONICITY, UNSPECIFIED SITE: ICD-10-CM

## 2022-07-27 DIAGNOSIS — M54.9 BACK PAIN WITH HISTORY OF SPINAL SURGERY: ICD-10-CM

## 2022-07-27 DIAGNOSIS — I10 BENIGN ESSENTIAL HTN: ICD-10-CM

## 2022-07-27 DIAGNOSIS — S22.000A CLOSED COMPRESSION FRACTURE OF THORACIC VERTEBRA, INITIAL ENCOUNTER (HCC): ICD-10-CM

## 2022-07-27 DIAGNOSIS — S32.010A CLOSED COMPRESSION FRACTURE OF L1 VERTEBRA, INITIAL ENCOUNTER (HCC): ICD-10-CM

## 2022-07-27 DIAGNOSIS — R53.82 CHRONIC FATIGUE: ICD-10-CM

## 2022-07-27 DIAGNOSIS — R73.01 IFG (IMPAIRED FASTING GLUCOSE): ICD-10-CM

## 2022-07-27 DIAGNOSIS — E53.8 VITAMIN B 12 DEFICIENCY: ICD-10-CM

## 2022-07-27 DIAGNOSIS — Z98.890 BACK PAIN WITH HISTORY OF SPINAL SURGERY: ICD-10-CM

## 2022-07-27 LAB
ALBUMIN SERPL-MCNC: 4.8 G/DL (ref 3.5–5.2)
ALP BLD-CCNC: 50 U/L (ref 40–129)
ALT SERPL-CCNC: 17 U/L (ref 0–40)
AMPHETAMINE SCREEN, URINE: NOT DETECTED
ANION GAP SERPL CALCULATED.3IONS-SCNC: 15 MMOL/L (ref 7–16)
AST SERPL-CCNC: 16 U/L (ref 0–39)
BARBITURATE SCREEN URINE: NOT DETECTED
BENZODIAZEPINE SCREEN, URINE: NOT DETECTED
BILIRUB SERPL-MCNC: 0.8 MG/DL (ref 0–1.2)
BUN BLDV-MCNC: 10 MG/DL (ref 6–20)
CALCIUM SERPL-MCNC: 9.6 MG/DL (ref 8.6–10.2)
CANNABINOID SCREEN URINE: POSITIVE
CHLORIDE BLD-SCNC: 103 MMOL/L (ref 98–107)
CHOLESTEROL, TOTAL: 282 MG/DL (ref 0–199)
CO2: 22 MMOL/L (ref 22–29)
COCAINE METABOLITE SCREEN URINE: NOT DETECTED
CREAT SERPL-MCNC: 0.9 MG/DL (ref 0.7–1.2)
ESTRADIOL LEVEL: 12.5 PG/ML
FENTANYL SCREEN, URINE: NOT DETECTED
FOLATE: >20 NG/ML (ref 4.8–24.2)
FOLLICLE STIMULATING HORMONE: 7.7 MIU/ML
GFR AFRICAN AMERICAN: >60
GFR NON-AFRICAN AMERICAN: >60 ML/MIN/1.73
GLUCOSE BLD-MCNC: 99 MG/DL (ref 74–99)
HBA1C MFR BLD: 5.5 % (ref 4–5.6)
HCG QUALITATIVE: NEGATIVE
HCT VFR BLD CALC: 42.8 % (ref 37–54)
HDLC SERPL-MCNC: 82 MG/DL
HEMOGLOBIN: 14.2 G/DL (ref 12.5–16.5)
LDL CHOLESTEROL CALCULATED: 178 MG/DL (ref 0–99)
LUTEINIZING HORMONE: 2.6 MIU/ML
Lab: ABNORMAL
MCH RBC QN AUTO: 32.9 PG (ref 26–35)
MCHC RBC AUTO-ENTMCNC: 33.2 % (ref 32–34.5)
MCV RBC AUTO: 99.1 FL (ref 80–99.9)
METHADONE SCREEN, URINE: NOT DETECTED
OPIATE SCREEN URINE: NOT DETECTED
OXYCODONE URINE: POSITIVE
PDW BLD-RTO: 12.8 FL (ref 11.5–15)
PHENCYCLIDINE SCREEN URINE: NOT DETECTED
PLATELET # BLD: 346 E9/L (ref 130–450)
PMV BLD AUTO: 9.6 FL (ref 7–12)
POTASSIUM SERPL-SCNC: 5 MMOL/L (ref 3.5–5)
PROLACTIN: 4.34 NG/ML
PROSTATE SPECIFIC ANTIGEN: 0.36 NG/ML (ref 0–4)
RBC # BLD: 4.32 E12/L (ref 3.8–5.8)
SODIUM BLD-SCNC: 140 MMOL/L (ref 132–146)
TESTOSTERONE TOTAL: 308.7 NG/DL
TOTAL PROTEIN: 7.7 G/DL (ref 6.4–8.3)
TRIGL SERPL-MCNC: 108 MG/DL (ref 0–149)
URIC ACID, SERUM: 8.1 MG/DL (ref 3.4–7)
VITAMIN B-12: 451 PG/ML (ref 211–946)
VLDLC SERPL CALC-MCNC: 22 MG/DL
WBC # BLD: 7.8 E9/L (ref 4.5–11.5)

## 2022-07-27 PROCEDURE — 99214 OFFICE O/P EST MOD 30 MIN: CPT | Performed by: FAMILY MEDICINE

## 2022-07-27 RX ORDER — OXYCODONE AND ACETAMINOPHEN 10; 325 MG/1; MG/1
1 TABLET ORAL EVERY 4 HOURS PRN
Qty: 90 TABLET | Refills: 0 | Status: SHIPPED | OUTPATIENT
Start: 2022-07-27 | End: 2022-08-26

## 2022-07-27 ASSESSMENT — PATIENT HEALTH QUESTIONNAIRE - PHQ9
SUM OF ALL RESPONSES TO PHQ QUESTIONS 1-9: 0
SUM OF ALL RESPONSES TO PHQ QUESTIONS 1-9: 0
1. LITTLE INTEREST OR PLEASURE IN DOING THINGS: 0
SUM OF ALL RESPONSES TO PHQ QUESTIONS 1-9: 0
2. FEELING DOWN, DEPRESSED OR HOPELESS: 0
SUM OF ALL RESPONSES TO PHQ QUESTIONS 1-9: 0
SUM OF ALL RESPONSES TO PHQ9 QUESTIONS 1 & 2: 0

## 2022-07-27 ASSESSMENT — LIFESTYLE VARIABLES: HOW OFTEN DO YOU HAVE A DRINK CONTAINING ALCOHOL: NEVER

## 2022-07-27 NOTE — PROGRESS NOTES
Kori Vegas is a 45 y.o. male  . Subjective:      Saw Dr Kenny Lazo. Has herniated disc above the current fusion. They are discussing another surgery in December or January. Still having a lot of pain. Saw Dr Tom Martinez . Will have blood work done for him and ultrasound. Will start psyllium fiber and oat bran. Did not tolerate red rice yeast      Review of Systems   Constitutional:  Negative for activity change, appetite change, chills, diaphoresis, fatigue, fever and unexpected weight change. HENT:  Negative for congestion, dental problem, drooling, ear discharge, ear pain, facial swelling, hearing loss, mouth sores, nosebleeds, postnasal drip, rhinorrhea, sinus pressure, sneezing, sore throat, tinnitus, trouble swallowing and voice change. Eyes:  Negative for photophobia, pain, discharge, redness, itching and visual disturbance. Respiratory:  Negative for apnea, cough, choking, chest tightness, shortness of breath, wheezing and stridor. Cardiovascular:  Negative for chest pain, palpitations and leg swelling. Gastrointestinal:  Negative for abdominal distention, abdominal pain, anal bleeding, blood in stool, constipation, diarrhea, nausea, rectal pain and vomiting. Endocrine: Negative for cold intolerance, heat intolerance, polydipsia, polyphagia and polyuria. Genitourinary:  Negative for decreased urine volume, difficulty urinating, dysuria, enuresis, flank pain, frequency, genital sores, hematuria, penile discharge, penile pain, penile swelling, scrotal swelling, testicular pain and urgency. Musculoskeletal:  Positive for arthralgias, back pain, gait problem and myalgias. Negative for joint swelling, neck pain and neck stiffness. Skin:  Negative for color change, pallor, rash and wound. Allergic/Immunologic: Negative for environmental allergies, food allergies and immunocompromised state.    Neurological:  Negative for dizziness, tremors, seizures, syncope, facial asymmetry, speech difficulty, weakness, light-headedness, numbness and headaches. Hematological:  Negative for adenopathy. Does not bruise/bleed easily. Psychiatric/Behavioral:  Negative for agitation, behavioral problems, confusion, decreased concentration, dysphoric mood, hallucinations, self-injury, sleep disturbance and suicidal ideas. The patient is not nervous/anxious and is not hyperactive. Past Medical History:   Diagnosis Date    Gout     Hyperlipidemia     Hypertension        Social History     Socioeconomic History    Marital status: Single     Spouse name: Not on file    Number of children: Not on file    Years of education: Not on file    Highest education level: Not on file   Occupational History    Not on file   Tobacco Use    Smoking status: Former     Types: Pipe    Smokeless tobacco: Never    Tobacco comments:     pipe occassionally   Vaping Use    Vaping Use: Never used   Substance and Sexual Activity    Alcohol use:  Yes     Alcohol/week: 0.0 standard drinks     Comment: social    Drug use: Never    Sexual activity: Not on file   Other Topics Concern    Not on file   Social History Narrative    Not on file     Social Determinants of Health     Financial Resource Strain: Low Risk     Difficulty of Paying Living Expenses: Not hard at all   Food Insecurity: No Food Insecurity    Worried About Running Out of Food in the Last Year: Never true    Ran Out of Food in the Last Year: Never true   Transportation Needs: Not on file   Physical Activity: Not on file   Stress: Not on file   Social Connections: Not on file   Intimate Partner Violence: Not on file   Housing Stability: Not on file       Family History   Problem Relation Age of Onset    Cancer Father         leukomia       Current Outpatient Medications on File Prior to Visit   Medication Sig Dispense Refill    varenicline (CHANTIX) 1 MG tablet Take 1 tablet by mouth 2 times daily 60 tablet 5    amLODIPine-benazepril (LOTREL) 10-20 MG per capsule TAKE 1 CAPSULE DAILY 90 capsule 3    indomethacin (INDOCIN) 50 MG capsule Take 1 capsule by mouth 3 times daily as needed for Pain (with meals) 90 capsule 4    tadalafil (CIALIS) 20 MG tablet Take 1 tablet by mouth as needed for Erectile Dysfunction 30 tablet 5    valACYclovir (VALTREX) 500 MG tablet Take 1 tablet by mouth 2 times daily 60 tablet 5    Red Yeast Rice Extract (RED YEAST RICE PO) Take 1 tablet by mouth daily      EPINEPHrine (EPIPEN 2-EVERTON) 0.3 MG/0.3ML SOAJ injection Inject 0.3 mLs into the muscle once for 1 dose Use as directed for allergic reaction 2 each 5     No current facility-administered medications on file prior to visit. Allergies   Allergen Reactions    Peanuts [Peanut Oil]        I have reviewed his allergies, medications, problem list, medical,social and family history and have updated as needed in the electronic medical record. Objective:     Physical Exam  Vitals and nursing note reviewed. Constitutional:       General: He is not in acute distress. Appearance: He is well-developed. He is not diaphoretic. HENT:      Head: Normocephalic and atraumatic. Right Ear: External ear normal.      Left Ear: External ear normal.      Nose: Nose normal.      Mouth/Throat:      Pharynx: No oropharyngeal exudate. Eyes:      General: No scleral icterus. Right eye: No discharge. Left eye: No discharge. Conjunctiva/sclera: Conjunctivae normal.      Pupils: Pupils are equal, round, and reactive to light. Neck:      Thyroid: No thyromegaly. Vascular: No JVD. Trachea: No tracheal deviation. Cardiovascular:      Rate and Rhythm: Normal rate and regular rhythm. Heart sounds: Normal heart sounds. No murmur heard. No friction rub. No gallop. Pulmonary:      Effort: Pulmonary effort is normal. No respiratory distress. Breath sounds: Normal breath sounds. No stridor. No wheezing or rales. Chest:      Chest wall: No tenderness.    Abdominal:      General: Bowel sounds are normal. There is no distension. Palpations: Abdomen is soft. There is no mass. Tenderness: There is no abdominal tenderness. There is no guarding or rebound. Genitourinary:     Comments: Deferred by patient   Musculoskeletal:         General: No tenderness. Normal range of motion. Cervical back: Normal range of motion and neck supple. Comments: Increased spasm L1 to S1 with decreased ROM. + straight leg raising and contralateral straight leg raising tests B/L      Lymphadenopathy:      Cervical: No cervical adenopathy. Skin:     General: Skin is warm and dry. Coloration: Skin is not pale. Findings: No erythema or rash. Neurological:      Mental Status: He is alert and oriented to person, place, and time. Cranial Nerves: No cranial nerve deficit. Motor: No abnormal muscle tone. Coordination: Coordination normal.      Deep Tendon Reflexes: Reflexes are normal and symmetric. Reflexes normal.   Psychiatric:         Behavior: Behavior normal.         Thought Content: Thought content normal.         Judgment: Judgment normal.       Assessment / Plan:   Ayaan Luna was seen today for 3 month follow-up. Diagnoses and all orders for this visit:    Lumbar disc disease with radiculopathy  -     oxyCODONE-acetaminophen (PERCOCET)  MG per tablet; Take 1 tablet by mouth every 4 hours as needed for Pain for up to 30 days. Mixed hyperlipidemia  -     Lipid Panel; Future    Statin intolerance    Benign essential HTN    IFG (impaired fasting glucose)  -     Hemoglobin A1C; Future    Chronic fatigue  -     CBC; Future  -     Comprehensive Metabolic Panel; Future    Vitamin B 12 deficiency  -     Vitamin B12 & Folate; Future    Screening for malignant neoplasm of prostate  -     PSA Screening; Future    Gout, unspecified cause, unspecified chronicity, unspecified site  -     Uric Acid;  Future    Back pain with history of spinal surgery  -     oxyCODONE-acetaminophen (PERCOCET)  MG per tablet; Take 1 tablet by mouth every 4 hours as needed for Pain for up to 30 days. Closed compression fracture of thoracic vertebra, initial encounter ([facility])  -     oxyCODONE-acetaminophen (PERCOCET)  MG per tablet; Take 1 tablet by mouth every 4 hours as needed for Pain for up to 30 days. Closed compression fracture of L1 vertebra, initial encounter ([facility])  -     oxyCODONE-acetaminophen (PERCOCET)  MG per tablet; Take 1 tablet by mouth every 4 hours as needed for Pain for up to 30 days. Controlled Substance Monitoring:    Acute and Chronic Pain Monitoring:   RX Monitoring 8/1/2022   Periodic Controlled Substance Monitoring Possible medication side effects, risk of tolerance/dependence & alternative treatments discussed. ;No signs of potential drug abuse or diversion identified.;Obtaining appropriate analgesic effect of treatment. Chronic Pain > 50 MEDD -        Reviewed healthmaintenance report. Patient is aware of deficiencies and suggested preventative tests.

## 2022-07-28 LAB — T4 FREE: 1.24 NG/DL (ref 0.93–1.7)

## 2022-08-01 ASSESSMENT — ENCOUNTER SYMPTOMS
STRIDOR: 0
ANAL BLEEDING: 0
WHEEZING: 0
CHOKING: 0
DIARRHEA: 0
APNEA: 0
PHOTOPHOBIA: 0
NAUSEA: 0
VOICE CHANGE: 0
COUGH: 0
COLOR CHANGE: 0
SHORTNESS OF BREATH: 0
EYE ITCHING: 0
EYE REDNESS: 0
FACIAL SWELLING: 0
EYE DISCHARGE: 0
CHEST TIGHTNESS: 0
ABDOMINAL PAIN: 0
CONSTIPATION: 0
EYE PAIN: 0
VOMITING: 0
RHINORRHEA: 0
ABDOMINAL DISTENTION: 0
BLOOD IN STOOL: 0
BACK PAIN: 1
SINUS PRESSURE: 0
TROUBLE SWALLOWING: 0
RECTAL PAIN: 0
SORE THROAT: 0

## 2022-08-12 DIAGNOSIS — N62 GYNECOMASTIA: Primary | ICD-10-CM

## 2022-08-12 NOTE — RESULT ENCOUNTER NOTE
Blood work overall looks good except for elevated cholesterol . I recommend starting medication.  Let me know what he says
alert

## 2022-08-16 ENCOUNTER — TELEPHONE (OUTPATIENT)
Dept: MAMMOGRAPHY | Age: 52
End: 2022-08-16

## 2022-08-16 NOTE — TELEPHONE ENCOUNTER
Call to patient to reschedule for diagnostic mammogram and breast ultrasound. Previous appointment was cancelled due to needing diagnostic mammogram orders. Orders placed in Epic per AMBER Oliva. Patient states he is going to reschedule his follow up appointment at Dr. Millie Castro office on 8/26/22 and would like to hold off on scheduling diagnostic breast imaging at this time until a later date. Patient informed orders are placed in Epic and to call back when ready to schedule, patient verbalized understanding.  ANTHONY Reyes, RN -Breast Nurse Navigator

## 2022-08-18 NOTE — PROGRESS NOTES
Subjective: Follow up today from ischial presentation of symptomatic gynecomastia. Denies fever, nausea, vomiting, leg pain or swelling, pain is mild to his bilateral breasts. The patient states he has obtained his recent requested lab work and associated breast imaging. He presents her office today to review imaging and lab work as well as plan for surgical intervention. Objective:    VITALS:  Temp 97.2 °F (36.2 °C) (Temporal)   Ht 6' (1.829 m)   Wt 230 lb (104.3 kg)   BMI 31.19 kg/m²   Exam deferred today discussion only. Assessment:    Labs Collected 7/27/2022 11:39    2 Result Notes    Component Ref Range & Units 3 wk ago    Sodium 132 - 146 mmol/L 140    Potassium 3.5 - 5.0 mmol/L 5.0    Chloride 98 - 107 mmol/L 103    CO2 22 - 29 mmol/L 22    Anion Gap 7 - 16 mmol/L 15    Glucose 74 - 99 mg/dL 99    BUN 6 - 20 mg/dL 10    Creatinine 0.7 - 1.2 mg/dL 0.9    GFR Non-African American >=60 mL/min/1.73 >60    Comment: Chronic Kidney Disease: less than 60 ml/min/1.73 sq.m. Kidney Failure: less than 15 ml/min/1.73 sq.m. Results valid for patients 18 years and older. GFR   >60    Calcium 8.6 - 10.2 mg/dL 9.6    Total Protein 6.4 - 8.3 g/dL 7.7    Albumin 3.5 - 5.2 g/dL 4.8    Total Bilirubin 0.0 - 1.2 mg/dL 0.8    Alkaline Phosphatase 40 - 129 U/L 50    ALT 0 - 40 U/L 17    AST 0 - 39 U/L 16         View Full Report             Result Care Coordination        Result Notes     Ehsan Coleman, [address]   8/15/2022 11:43 AM EDT Back to Top        Sent my chart message     Pradeep Gilliam DO   8/11/2022 10:36 PM EDT         Blood work overall looks good except for elevated cholesterol . I recommendstarting medication.  Let me know what he says        Patient Communication     Add Comments   Seen Back to Top                     CBC  Order: [id number]  Collected 7/27/2022 11:39      2 Result Notes      Component Ref Range & Units 3 wk ago    WBC 4.5 - 11.5 E9/L 7.8    RBC 3.80 - 5.80 E12/L 4.32    Hemoglobin 12.5 - 16.5 g/dL 14.2    Hematocrit 37.0 - 54.0 % 42.8    MCV 80.0 - 99.9 fL 99.1    MCH 26.0 - 35.0 pg 32.9    MCHC 32.0 - 34.5 % 33.2    RDW 11.5 - 15.0 fL 12.8    Platelets 040 - 829 D2/Y 346    MPV 7.0 - 12.0 fL 9.6           View Full Report             Result Care Coordination        Result Notes     Claudene Sample, [address]   8/15/2022 11:43 AM EDT Back to Top        Sent my chart message     james Nagel DO   8/11/2022 10:36 PM EDT         Blood work overall looks good except for elevated cholesterol . I recommendstarting medication. Let me know what he says        Patient Communication     Add Comments   Seen Back to Top                     TESTOSTERONE  Order: [id number]  Collected 7/27/2022 11:39      0 Result Notes      Component Ref Range & Units 3 wk ago    Testosterone ng/dL 308.7    Comment: Testosterone Reference Ranges:   Males   20 - 49 years:    249 - 836 ng/dL   = or > 50 years:  193 - 740 ng/dL     Females   20 - 49 years:    8.4 - 48.1 ng/dL   = or > 50 years:  2.9 - 40.8 ng/dL           View Full Report             Result Care Coordination        Patient Communication     Add Comments   Seen Back to Top                     Estradiol  Order: [id number]  Collected 7/27/2022 11:39      0 Result Notes      Component Ref Range & Units 3 wk ago    Estradiol pg/mL 12.5    Comment: ESTRADIOL Reference Ranges:   Females:     A) Follicular phase   13.6 - 233 pg/mL     B) Midcycle phase     41.0 - 398 pg/mL     C) Luteal phase       22.3 - 341 pg/mL     D) Postmenopausal     Less than 5.0 - 138 pg/mL   Males:                  25.8 - 60.7 pg/mL     Due to the risk of cross-reactivity, this estradiol assay should not be used   when monitoring estradiol levels in patients being treated with Fulvestrant   (Faslodex). An alternative method not subject to this interference, such as   LC/MS is recommended.            View Full Report             Result Care Coordination        Patient Communication     Add Comments   Seen Back to Top                     HCG Qualitative, Serum  Order: [id number]  Collected 7/27/2022 11:39      0 Result Notes      Component Ref Range & Units 3 wk ago    hCG Qual NEGATIVE NEGATIVE    Comment: Test results should always be evaluated with all available   clinical data.            View Full Report             Result Care Coordination        Patient Communication     Add Comments   Seen Back to Top                     Follicle Stimulating Hormone  Order: [id number]  Collected 7/27/2022 11:39      0 Result Notes      Component Ref Range & Units 3 wk ago    [address] mIU/mL 7.7    Comment: [address] Normal Range:       Adult Female        A) Follicular          3.5 -  12.5 mIU/mL        B) Luteal              1.7 -   7.7 mIU/mL        C) Midcycle            4.7 -  21.5 mIU/mL        D) Postmenopausal     25.8 - 134.8 mIU/mL       Males                     1.5 -  12.4 mIU/mL           View Full Report             Result Care Coordination        Patient Communication     Add Comments   Seen Back to Top                     Luteinizing Hormone  Order: [id number]  Collected 7/27/2022 11:39      0 Result Notes      Component Ref Range & Units 3 wk ago    LH mIU/mL 2.6    Comment: LH Normal Ranges:     Adult Female      A) Follicular      2.4 - 41.7 mIU/mL      B) Luteal          1.0 - 11.4 mIU/mL      C) Midcycle       14.0 - 95.6 mIU/mL      D) Postmenopausal  7.7 - 58.5 mIU/mL       Adult Male            1.7 - 8.6 mIU/mL     Children             <0.1 - 6.0 mIU/mL           View Full Report             Result Care Coordination        Patient Communication     Add Comments   Seen Back to Top                     Prolactin  Order: [id number]  Collected 7/27/2022 11:39      0 Result Notes      Component Ref Range & Units 3 wk ago    Prolactin ng/mL 4.34    Comment: PROLACTIN Reference Range:   Female (not pregnant)  4.79 - 23.3 ng/mL     Male                   4.04 - 15.2 ng/mL           View Full Report             Result Care Coordination        Patient Communication     Add Comments   Seen Back to Top                     T4, Free  Order: [id number]  Collected 7/27/2022 11:39      0 Result Notes      Component Ref Range & Units 3 wk ago    T4 Free 0.93 - 1.70 ng/dL 1.24           View Full Report             Result Care Coordination        Patient Communication     Add Comments   Seen Back to Top                      Contains abnormal data Urine Drug Screen  Order: [id number]  Collected 7/27/2022 11:40      0 Result Notes      Component Ref Range & Units 3 wk ago    Amphetamine Screen, Urine Negative <1000 ng/mL NOT DETECTED    Barbiturate Screen, Ur Negative < 200 ng/mL NOT DETECTED    Benzodiazepine Screen, Urine Negative < 200 ng/mL NOT DETECTED    Cannabinoid Scrn, Ur Negative < 50ng/mL POSITIVE Abnormal     Cocaine Metabolite Screen, Urine Negative < 300 ng/mL NOT DETECTED    Opiate Scrn, Ur Negative < 300ng/mL NOT DETECTED    Comment: Note:  The Opiate Screen is not intended to detect Oxycodone. PCP Screen, Urine Negative < 25 ng/mL NOT DETECTED    Methadone Screen, Urine Negative <300 ng/mL NOT DETECTED    Oxycodone Urine Negative <100 ng/mL POSITIVE Abnormal     FENTANYL SCREEN, URINE Negative <1 ng/mL NOT DETECTED    Drug Screen Comment:  see below    Comment: These drug screen results are for medical purposes only and   should not be considered definitive or confirmed. The drug   methodology concentration value must be greater than or equal   to the cutoff to be reported as positive. Confirmatory testing   orders and/or interpretive screening questions can be directed   to toxicology at [phone]. The absence of expected drug(s) and/or metabolite(s) may be due   to inappropriate timing of specimen collection relative to drug   administration, poor drug absorption, diluted/adulterated urine,   or limitations of screening testing methodology. Imaging   EXAMINATION:   DIAGNOSTIC DIGITAL BILATERAL BREASTS MAMMOGRAM WITH TOMOSYNTHESIS; TARGETED   ULTRASOUND OF THE RIGHT BREAST; TARGETED ULTRASOUND OF THE LEFT BREAST,   8/22/2022 2:02 pm       TECHNIQUE:   Diagnostic mammography of the bilateral breasts was performed with   tomosynthesis. 2D standard and 3D tomosynthesis combination imaging   performed through both breasts. Computer aided detection was utilized in the   interpretation of this exam.; Targeted ultrasound of the right breast was   performed.; Target ultrasound of the left breast was performed. Views: MLO and CC views of the right and left breast as well as tomosynthesis   images of the right and left breast were obtained. Dedicated targeted   ultrasound examination of the right and left breast was performed. COMPARISON:   There are no prior studies available for review       HISTORY:   ORDERING SYSTEM PROVIDED HISTORY: Gynecomastia   TECHNOLOGIST PROVIDED HISTORY:   Further imaging can be completed per [address] protocol       FINDINGS:   The breasts are almost entirely fatty. There is no right or left breast mass or suspicious cluster of   microcalcifications. There is no skin thickening or nipple retraction. No mass is seen within the axillary tail of the right or left breast.       Real-time targeted ultrasound examination of the right and left breast was   performed. There is no solid or cystic mass. There are no findings to suggest gynecomastia. There is no skin thickening. Images of the right and left axilla are negative for any lymphadenopathy. Impression   1. There is no mammographic or sonographic evidence of malignancy of the   right or left male breast.   2. There are no findings of gynecomastia. 3. There is no right or left axillary lymphadenopathy. BIRADS:   BIRADS - CATEGORY 2       Benign Findings. Follow-up with physical exam is recommended. OVERALL ASSESSMENT - BENIGN       A letter of notification will be sent to the patient regarding the results. The [employer] recommends annual mammograms for women 40   years and older. Plan:     Symptomatic bilateral gynecomastia. I informed the patient that second to his marijuana use he is not a current candidate for bilateral gynecomastia surgical correction. Informed him that he will need to be at least 1 month marijuana free before having his new urine drug screen drawn. At this time we will assess for any changes in his bilateral breast tissue or symptomatology. Follow-up in 3 months.

## 2022-08-22 ENCOUNTER — HOSPITAL ENCOUNTER (OUTPATIENT)
Dept: ULTRASOUND IMAGING | Age: 52
Discharge: HOME | End: 2022-08-22
Payer: COMMERCIAL

## 2022-08-22 ENCOUNTER — HOSPITAL ENCOUNTER (OUTPATIENT)
Dept: MAMMOGRAPHY | Age: 52
Discharge: HOME | End: 2022-08-24
Payer: COMMERCIAL

## 2022-08-22 VITALS — WEIGHT: 230 LBS | BODY MASS INDEX: 31.15 KG/M2 | HEIGHT: 72 IN

## 2022-08-22 DIAGNOSIS — N62 GYNECOMASTIA: ICD-10-CM

## 2022-08-22 PROCEDURE — G0279 TOMOSYNTHESIS, MAMMO: HCPCS

## 2022-08-22 PROCEDURE — 76642 ULTRASOUND BREAST LIMITED: CPT

## 2022-08-26 ENCOUNTER — OFFICE VISIT (OUTPATIENT)
Dept: SURGERY | Age: 52
End: 2022-08-26
Payer: COMMERCIAL

## 2022-08-26 VITALS — WEIGHT: 230 LBS | TEMPERATURE: 97.2 F | BODY MASS INDEX: 31.15 KG/M2 | HEIGHT: 72 IN

## 2022-08-26 DIAGNOSIS — N62 GYNECOMASTIA: Primary | ICD-10-CM

## 2022-08-26 DIAGNOSIS — F12.90 MARIJUANA USE: ICD-10-CM

## 2022-08-26 PROCEDURE — 99211 OFF/OP EST MAY X REQ PHY/QHP: CPT | Performed by: PHYSICIAN ASSISTANT

## 2022-12-05 DIAGNOSIS — I10 BENIGN ESSENTIAL HTN: ICD-10-CM

## 2022-12-05 DIAGNOSIS — E78.2 MIXED HYPERLIPIDEMIA: ICD-10-CM

## 2022-12-05 DIAGNOSIS — R73.01 IFG (IMPAIRED FASTING GLUCOSE): ICD-10-CM

## 2022-12-05 DIAGNOSIS — E78.5 HYPERLIPIDEMIA, UNSPECIFIED HYPERLIPIDEMIA TYPE: Primary | ICD-10-CM

## 2022-12-07 DIAGNOSIS — E78.5 HYPERLIPIDEMIA, UNSPECIFIED HYPERLIPIDEMIA TYPE: ICD-10-CM

## 2022-12-07 DIAGNOSIS — R73.01 IFG (IMPAIRED FASTING GLUCOSE): ICD-10-CM

## 2022-12-07 DIAGNOSIS — I10 BENIGN ESSENTIAL HTN: ICD-10-CM

## 2022-12-07 DIAGNOSIS — E78.2 MIXED HYPERLIPIDEMIA: ICD-10-CM

## 2022-12-07 LAB
ALBUMIN SERPL-MCNC: 4.6 G/DL (ref 3.5–5.2)
ALP BLD-CCNC: 55 U/L (ref 40–129)
ALT SERPL-CCNC: 17 U/L (ref 0–40)
ANION GAP SERPL CALCULATED.3IONS-SCNC: 16 MMOL/L (ref 7–16)
AST SERPL-CCNC: 23 U/L (ref 0–39)
BASOPHILS ABSOLUTE: 0.09 E9/L (ref 0–0.2)
BASOPHILS RELATIVE PERCENT: 1.1 % (ref 0–2)
BILIRUB SERPL-MCNC: 0.8 MG/DL (ref 0–1.2)
BUN BLDV-MCNC: 13 MG/DL (ref 6–20)
CALCIUM SERPL-MCNC: 9.8 MG/DL (ref 8.6–10.2)
CHLORIDE BLD-SCNC: 104 MMOL/L (ref 98–107)
CHOLESTEROL, TOTAL: 285 MG/DL (ref 0–199)
CO2: 20 MMOL/L (ref 22–29)
CREAT SERPL-MCNC: 0.9 MG/DL (ref 0.7–1.2)
EOSINOPHILS ABSOLUTE: 0.3 E9/L (ref 0.05–0.5)
EOSINOPHILS RELATIVE PERCENT: 3.6 % (ref 0–6)
GFR SERPL CREATININE-BSD FRML MDRD: >60 ML/MIN/1.73
GLUCOSE BLD-MCNC: 100 MG/DL (ref 74–99)
HBA1C MFR BLD: 5.5 % (ref 4–5.6)
HCT VFR BLD CALC: 41.9 % (ref 37–54)
HDLC SERPL-MCNC: 77 MG/DL
HEMOGLOBIN: 13.8 G/DL (ref 12.5–16.5)
IMMATURE GRANULOCYTES #: 0.06 E9/L
IMMATURE GRANULOCYTES %: 0.7 % (ref 0–5)
LDL CHOLESTEROL CALCULATED: 175 MG/DL (ref 0–99)
LYMPHOCYTES ABSOLUTE: 2.46 E9/L (ref 1.5–4)
LYMPHOCYTES RELATIVE PERCENT: 29.5 % (ref 20–42)
MCH RBC QN AUTO: 33.2 PG (ref 26–35)
MCHC RBC AUTO-ENTMCNC: 32.9 % (ref 32–34.5)
MCV RBC AUTO: 100.7 FL (ref 80–99.9)
MONOCYTES ABSOLUTE: 0.63 E9/L (ref 0.1–0.95)
MONOCYTES RELATIVE PERCENT: 7.5 % (ref 2–12)
NEUTROPHILS ABSOLUTE: 4.81 E9/L (ref 1.8–7.3)
NEUTROPHILS RELATIVE PERCENT: 57.6 % (ref 43–80)
PDW BLD-RTO: 13 FL (ref 11.5–15)
PLATELET # BLD: 362 E9/L (ref 130–450)
PMV BLD AUTO: 9.9 FL (ref 7–12)
POTASSIUM SERPL-SCNC: 4.2 MMOL/L (ref 3.5–5)
RBC # BLD: 4.16 E12/L (ref 3.8–5.8)
SODIUM BLD-SCNC: 140 MMOL/L (ref 132–146)
T4 FREE: 1.03 NG/DL (ref 0.93–1.7)
TOTAL PROTEIN: 7.5 G/DL (ref 6.4–8.3)
TRIGL SERPL-MCNC: 163 MG/DL (ref 0–149)
TSH SERPL DL<=0.05 MIU/L-ACNC: 0.92 UIU/ML (ref 0.27–4.2)
VLDLC SERPL CALC-MCNC: 33 MG/DL
WBC # BLD: 8.4 E9/L (ref 4.5–11.5)

## 2022-12-08 ENCOUNTER — OFFICE VISIT (OUTPATIENT)
Dept: FAMILY MEDICINE CLINIC | Age: 52
End: 2022-12-08
Payer: COMMERCIAL

## 2022-12-08 VITALS
HEIGHT: 72 IN | OXYGEN SATURATION: 96 % | WEIGHT: 247 LBS | BODY MASS INDEX: 33.46 KG/M2 | RESPIRATION RATE: 18 BRPM | HEART RATE: 84 BPM | DIASTOLIC BLOOD PRESSURE: 80 MMHG | SYSTOLIC BLOOD PRESSURE: 122 MMHG

## 2022-12-08 DIAGNOSIS — R53.82 CHRONIC FATIGUE: ICD-10-CM

## 2022-12-08 DIAGNOSIS — E55.9 VITAMIN D DEFICIENCY: ICD-10-CM

## 2022-12-08 DIAGNOSIS — R73.01 IFG (IMPAIRED FASTING GLUCOSE): ICD-10-CM

## 2022-12-08 DIAGNOSIS — I10 BENIGN ESSENTIAL HTN: ICD-10-CM

## 2022-12-08 DIAGNOSIS — E53.8 VITAMIN B 12 DEFICIENCY: ICD-10-CM

## 2022-12-08 DIAGNOSIS — Z78.9 STATIN INTOLERANCE: ICD-10-CM

## 2022-12-08 DIAGNOSIS — Z98.890 BACK PAIN WITH HISTORY OF SPINAL SURGERY: ICD-10-CM

## 2022-12-08 DIAGNOSIS — S22.000A CLOSED COMPRESSION FRACTURE OF THORACIC VERTEBRA, INITIAL ENCOUNTER (HCC): ICD-10-CM

## 2022-12-08 DIAGNOSIS — M54.9 BACK PAIN WITH HISTORY OF SPINAL SURGERY: ICD-10-CM

## 2022-12-08 DIAGNOSIS — M51.16 LUMBAR DISC DISEASE WITH RADICULOPATHY: ICD-10-CM

## 2022-12-08 DIAGNOSIS — S32.010A CLOSED COMPRESSION FRACTURE OF L1 VERTEBRA, INITIAL ENCOUNTER (HCC): ICD-10-CM

## 2022-12-08 DIAGNOSIS — E78.2 MIXED HYPERLIPIDEMIA: Primary | ICD-10-CM

## 2022-12-08 PROCEDURE — 99214 OFFICE O/P EST MOD 30 MIN: CPT | Performed by: FAMILY MEDICINE

## 2022-12-08 PROCEDURE — 3074F SYST BP LT 130 MM HG: CPT | Performed by: FAMILY MEDICINE

## 2022-12-08 PROCEDURE — 3078F DIAST BP <80 MM HG: CPT | Performed by: FAMILY MEDICINE

## 2022-12-08 RX ORDER — OXYCODONE AND ACETAMINOPHEN 10; 325 MG/1; MG/1
1 TABLET ORAL EVERY 4 HOURS PRN
Qty: 90 TABLET | Refills: 0 | Status: SHIPPED | OUTPATIENT
Start: 2022-12-08 | End: 2023-01-07

## 2022-12-08 RX ORDER — ICOSAPENT ETHYL 1000 MG/1
2 CAPSULE ORAL 2 TIMES DAILY
Qty: 120 CAPSULE | Refills: 4 | Status: SHIPPED | OUTPATIENT
Start: 2022-12-08

## 2022-12-08 SDOH — ECONOMIC STABILITY: FOOD INSECURITY: WITHIN THE PAST 12 MONTHS, YOU WORRIED THAT YOUR FOOD WOULD RUN OUT BEFORE YOU GOT MONEY TO BUY MORE.: NEVER TRUE

## 2022-12-08 SDOH — ECONOMIC STABILITY: FOOD INSECURITY: WITHIN THE PAST 12 MONTHS, THE FOOD YOU BOUGHT JUST DIDN'T LAST AND YOU DIDN'T HAVE MONEY TO GET MORE.: NEVER TRUE

## 2022-12-08 ASSESSMENT — PATIENT HEALTH QUESTIONNAIRE - PHQ9
1. LITTLE INTEREST OR PLEASURE IN DOING THINGS: 0
SUM OF ALL RESPONSES TO PHQ QUESTIONS 1-9: 0
2. FEELING DOWN, DEPRESSED OR HOPELESS: 0
SUM OF ALL RESPONSES TO PHQ QUESTIONS 1-9: 0
SUM OF ALL RESPONSES TO PHQ9 QUESTIONS 1 & 2: 0

## 2022-12-08 ASSESSMENT — SOCIAL DETERMINANTS OF HEALTH (SDOH): HOW HARD IS IT FOR YOU TO PAY FOR THE VERY BASICS LIKE FOOD, HOUSING, MEDICAL CARE, AND HEATING?: NOT VERY HARD

## 2022-12-08 ASSESSMENT — LIFESTYLE VARIABLES: HOW OFTEN DO YOU HAVE A DRINK CONTAINING ALCOHOL: NEVER

## 2022-12-12 ASSESSMENT — ENCOUNTER SYMPTOMS
EYE PAIN: 0
EYE ITCHING: 0
RHINORRHEA: 0
DIARRHEA: 0
FACIAL SWELLING: 0
EYE REDNESS: 0
BLOOD IN STOOL: 0
BACK PAIN: 1
CHEST TIGHTNESS: 0
COLOR CHANGE: 0
ABDOMINAL DISTENTION: 0
ANAL BLEEDING: 0
APNEA: 0
RECTAL PAIN: 0
NAUSEA: 0
SORE THROAT: 0
VOMITING: 0
STRIDOR: 0
COUGH: 0
SHORTNESS OF BREATH: 0
TROUBLE SWALLOWING: 0
CONSTIPATION: 0
SINUS PRESSURE: 0
ABDOMINAL PAIN: 0
WHEEZING: 0
EYE DISCHARGE: 0
PHOTOPHOBIA: 0
VOICE CHANGE: 0
CHOKING: 0

## 2022-12-12 NOTE — PROGRESS NOTES
Alexander Mclain is a 47 y.o. male  . Subjective:      Patient is scheduled for laminectomy lower back with . We discussed this at length. Patient using very seldom Percocet. Uses about a pill and a half at most per day on bad days. We discussed this medication at length. Patient understands habit forming nature of medication and assumes all risks. Patient will not drink alcohol , work or drive while on medication. Fully this will improve once he has a surgery. Discussed blood work. Lipids are high we discussed risks. Patient does not tolerate statins. We will try some Vascepa and see if this does not help. Of course he understands that does not afford to protection of the statin does as far as cardiovascular disease and stroke however considering he did not tolerate statins we will go ahead and start this if it does not work we will go ahead and consider starting a different statin may be of a lovastatin or pravastatin at a very low dose if this does not work. Discussed that daily  Extended visit at 35 minutes. We will see after surgery      Review of Systems   Constitutional:  Negative for activity change, appetite change, chills, diaphoresis, fatigue, fever and unexpected weight change. HENT:  Negative for congestion, dental problem, drooling, ear discharge, ear pain, facial swelling, hearing loss, mouth sores, nosebleeds, postnasal drip, rhinorrhea, sinus pressure, sneezing, sore throat, tinnitus, trouble swallowing and voice change. Eyes:  Negative for photophobia, pain, discharge, redness, itching and visual disturbance. Respiratory:  Negative for apnea, cough, choking, chest tightness, shortness of breath, wheezing and stridor. Cardiovascular:  Negative for chest pain, palpitations and leg swelling. Gastrointestinal:  Negative for abdominal distention, abdominal pain, anal bleeding, blood in stool, constipation, diarrhea, nausea, rectal pain and vomiting.    Endocrine: Negative for cold intolerance, heat intolerance, polydipsia, polyphagia and polyuria. Genitourinary:  Negative for decreased urine volume, difficulty urinating, dysuria, enuresis, flank pain, frequency, genital sores, hematuria, penile discharge, penile pain, penile swelling, scrotal swelling, testicular pain and urgency. Musculoskeletal:  Positive for arthralgias, back pain and gait problem. Negative for joint swelling, myalgias, neck pain and neck stiffness. Skin:  Negative for color change, pallor, rash and wound. Allergic/Immunologic: Negative for environmental allergies, food allergies and immunocompromised state. Neurological:  Negative for dizziness, tremors, seizures, syncope, facial asymmetry, speech difficulty, weakness, light-headedness, numbness and headaches. Hematological:  Negative for adenopathy. Does not bruise/bleed easily. Psychiatric/Behavioral:  Negative for agitation, behavioral problems, confusion, decreased concentration, dysphoric mood, hallucinations, self-injury, sleep disturbance and suicidal ideas. The patient is not nervous/anxious and is not hyperactive. Past Medical History:   Diagnosis Date    Gout     Hyperlipidemia     Hypertension        Social History     Socioeconomic History    Marital status: Single     Spouse name: Not on file    Number of children: Not on file    Years of education: Not on file    Highest education level: Not on file   Occupational History    Not on file   Tobacco Use    Smoking status: Former     Types: Pipe    Smokeless tobacco: Never    Tobacco comments:     pipe occassionally   Vaping Use    Vaping Use: Never used   Substance and Sexual Activity    Alcohol use:  Yes     Alcohol/week: 0.0 standard drinks     Comment: social    Drug use: Never    Sexual activity: Not on file   Other Topics Concern    Not on file   Social History Narrative    Not on file     Social Determinants of Health     Financial Resource Strain: Low Risk     Difficulty of Paying Living Expenses: Not very hard   Food Insecurity: No Food Insecurity    Worried About Running Out of Food in the Last Year: Never true    Ran Out of Food in the Last Year: Never true   Transportation Needs: Not on file   Physical Activity: Not on file   Stress: Not on file   Social Connections: Not on file   Intimate Partner Violence: Not on file   Housing Stability: Not on file       Family History   Problem Relation Age of Onset    Cancer Father         leukomia       Current Outpatient Medications on File Prior to Visit   Medication Sig Dispense Refill    varenicline (CHANTIX) 1 MG tablet Take 1 tablet by mouth 2 times daily 60 tablet 5    amLODIPine-benazepril (LOTREL) 10-20 MG per capsule TAKE 1 CAPSULE DAILY 90 capsule 3    indomethacin (INDOCIN) 50 MG capsule Take 1 capsule by mouth 3 times daily as needed for Pain (with meals) 90 capsule 4    tadalafil (CIALIS) 20 MG tablet Take 1 tablet by mouth as needed for Erectile Dysfunction 30 tablet 5    valACYclovir (VALTREX) 500 MG tablet Take 1 tablet by mouth 2 times daily 60 tablet 5    Red Yeast Rice Extract (RED YEAST RICE PO) Take 1 tablet by mouth daily      EPINEPHrine (EPIPEN 2-EVERTON) 0.3 MG/0.3ML SOAJ injection Inject 0.3 mLs into the muscle once for 1 dose Use as directed for allergic reaction 2 each 5     No current facility-administered medications on file prior to visit. Allergies   Allergen Reactions    Peanuts [Peanut Oil]        I have reviewed his allergies, medications, problem list, medical,social and family history and have updated as needed in the electronic medical record. Objective:     Physical Exam  Vitals and nursing note reviewed. Constitutional:       General: He is not in acute distress. Appearance: He is well-developed. He is not diaphoretic. HENT:      Head: Normocephalic and atraumatic.       Right Ear: External ear normal.      Left Ear: External ear normal.      Nose: Nose normal.      Mouth/Throat: Pharynx: No oropharyngeal exudate. Eyes:      General: No scleral icterus. Right eye: No discharge. Left eye: No discharge. Conjunctiva/sclera: Conjunctivae normal.      Pupils: Pupils are equal, round, and reactive to light. Neck:      Thyroid: No thyromegaly. Vascular: No JVD. Trachea: No tracheal deviation. Cardiovascular:      Rate and Rhythm: Normal rate and regular rhythm. Heart sounds: Normal heart sounds. No murmur heard. No friction rub. No gallop. Pulmonary:      Effort: Pulmonary effort is normal. No respiratory distress. Breath sounds: Normal breath sounds. No stridor. No wheezing or rales. Chest:      Chest wall: No tenderness. Abdominal:      General: Bowel sounds are normal. There is no distension. Palpations: Abdomen is soft. There is no mass. Tenderness: There is no abdominal tenderness. There is no guarding or rebound. Genitourinary:     Comments: Deferred by patient   Musculoskeletal:         General: No tenderness. Normal range of motion. Cervical back: Normal range of motion and neck supple. Comments: Increased spasm L1 to S1 with decreased ROM. + straight leg raising and contralateral straight leg raising tests B/L      Lymphadenopathy:      Cervical: No cervical adenopathy. Skin:     General: Skin is warm and dry. Coloration: Skin is not pale. Findings: No erythema or rash. Neurological:      Mental Status: He is alert and oriented to person, place, and time. Cranial Nerves: No cranial nerve deficit. Motor: No abnormal muscle tone. Coordination: Coordination normal.      Deep Tendon Reflexes: Reflexes are normal and symmetric. Reflexes normal.   Psychiatric:         Behavior: Behavior normal.         Thought Content: Thought content normal.         Judgment: Judgment normal.       Assessment / Plan:   Yair Bland was seen today for 3 month follow-up.     Diagnoses and all orders for this visit: Mixed hyperlipidemia  -     Icosapent Ethyl (VASCEPA) 1 g CAPS capsule; Take 2 capsules by mouth 2 times daily  -     Lipid Panel; Future    Benign essential HTN    IFG (impaired fasting glucose)  -     Hemoglobin A1C; Future    Statin intolerance    Vitamin B 12 deficiency  -     Vitamin B12 & Folate; Future    Chronic fatigue  -     CBC with Auto Differential; Future  -     Comprehensive Metabolic Panel; Future  -     TSH; Future    Vitamin D deficiency  -     Vitamin D 25 Hydroxy; Future    Lumbar disc disease with radiculopathy  -     oxyCODONE-acetaminophen (PERCOCET)  MG per tablet; Take 1 tablet by mouth every 4 hours as needed for Pain for up to 30 days. Back pain with history of spinal surgery  -     oxyCODONE-acetaminophen (PERCOCET)  MG per tablet; Take 1 tablet by mouth every 4 hours as needed for Pain for up to 30 days. Closed compression fracture of thoracic vertebra, initial encounter ([facility])  -     oxyCODONE-acetaminophen (PERCOCET)  MG per tablet; Take 1 tablet by mouth every 4 hours as needed for Pain for up to 30 days. Closed compression fracture of L1 vertebra, initial encounter ([facility])  -     oxyCODONE-acetaminophen (PERCOCET)  MG per tablet; Take 1 tablet by mouth every 4 hours as needed for Pain for up to 30 days. Controlled Substance Monitoring:    Acute and Chronic Pain Monitoring:   RX Monitoring 12/12/2022   Periodic Controlled Substance Monitoring Possible medication side effects, risk of tolerance/dependence & alternative treatments discussed. ;No signs of potential drug abuse or diversion identified.;Obtaining appropriate analgesic effect of treatment. Chronic Pain > 50 MEDD -        Reviewed healthmaintenance report. Patient is aware of deficiencies and suggested preventative tests.

## 2023-01-02 DIAGNOSIS — Z00.00 ANNUAL PHYSICAL EXAM: ICD-10-CM

## 2023-01-03 RX ORDER — AMLODIPINE BESYLATE AND BENAZEPRIL HYDROCHLORIDE 10; 20 MG/1; MG/1
CAPSULE ORAL
Qty: 90 CAPSULE | Refills: 3 | Status: SHIPPED | OUTPATIENT
Start: 2023-01-03

## 2023-01-06 DIAGNOSIS — Z91.010 PEANUT ALLERGY: ICD-10-CM

## 2023-01-06 DIAGNOSIS — M51.16 LUMBAR DISC DISEASE WITH RADICULOPATHY: ICD-10-CM

## 2023-01-06 DIAGNOSIS — M10.09 ACUTE IDIOPATHIC GOUT OF MULTIPLE SITES: ICD-10-CM

## 2023-01-06 RX ORDER — EPINEPHRINE 0.3 MG/.3ML
0.3 INJECTION SUBCUTANEOUS ONCE
Qty: 2 EACH | Refills: 5 | Status: SHIPPED | OUTPATIENT
Start: 2023-01-06 | End: 2023-01-06

## 2023-01-06 RX ORDER — INDOMETHACIN 50 MG/1
50 CAPSULE ORAL 3 TIMES DAILY PRN
Qty: 90 CAPSULE | Refills: 4 | Status: SHIPPED | OUTPATIENT
Start: 2023-01-06

## 2023-01-06 RX ORDER — OMEGA-3-ACID ETHYL ESTERS 1 G/1
2 CAPSULE, LIQUID FILLED ORAL 2 TIMES DAILY
Qty: 120 CAPSULE | Refills: 3 | Status: SHIPPED | OUTPATIENT
Start: 2023-01-06

## 2023-04-28 DIAGNOSIS — M54.9 BACK PAIN WITH HISTORY OF SPINAL SURGERY: ICD-10-CM

## 2023-04-28 DIAGNOSIS — Z98.890 BACK PAIN WITH HISTORY OF SPINAL SURGERY: ICD-10-CM

## 2023-04-28 DIAGNOSIS — M51.16 LUMBAR DISC DISEASE WITH RADICULOPATHY: ICD-10-CM

## 2023-04-28 DIAGNOSIS — S32.010A CLOSED COMPRESSION FRACTURE OF L1 VERTEBRA, INITIAL ENCOUNTER (HCC): ICD-10-CM

## 2023-04-28 DIAGNOSIS — S22.000A CLOSED COMPRESSION FRACTURE OF THORACIC VERTEBRA, INITIAL ENCOUNTER (HCC): ICD-10-CM

## 2023-04-28 RX ORDER — OXYCODONE AND ACETAMINOPHEN 10; 325 MG/1; MG/1
1 TABLET ORAL EVERY 4 HOURS PRN
Qty: 90 TABLET | Refills: 0 | Status: SHIPPED | OUTPATIENT
Start: 2023-04-28 | End: 2023-05-28

## 2023-06-22 DIAGNOSIS — E53.8 VITAMIN B 12 DEFICIENCY: ICD-10-CM

## 2023-06-22 DIAGNOSIS — E78.2 MIXED HYPERLIPIDEMIA: ICD-10-CM

## 2023-06-22 DIAGNOSIS — R73.01 IFG (IMPAIRED FASTING GLUCOSE): ICD-10-CM

## 2023-06-22 DIAGNOSIS — E55.9 VITAMIN D DEFICIENCY: ICD-10-CM

## 2023-06-22 DIAGNOSIS — R53.82 CHRONIC FATIGUE: ICD-10-CM

## 2023-06-22 LAB
ALBUMIN SERPL-MCNC: 5 G/DL (ref 3.5–5.2)
ALP SERPL-CCNC: 53 U/L (ref 40–129)
ALT SERPL-CCNC: 153 U/L (ref 0–40)
ANION GAP SERPL CALCULATED.3IONS-SCNC: 18 MMOL/L (ref 7–16)
AST SERPL-CCNC: 96 U/L (ref 0–39)
BASOPHILS # BLD: 0.08 E9/L (ref 0–0.2)
BASOPHILS NFR BLD: 1.3 % (ref 0–2)
BILIRUB SERPL-MCNC: 0.7 MG/DL (ref 0–1.2)
BUN SERPL-MCNC: 13 MG/DL (ref 6–20)
CALCIUM SERPL-MCNC: 9.5 MG/DL (ref 8.6–10.2)
CHLORIDE SERPL-SCNC: 100 MMOL/L (ref 98–107)
CHOLESTEROL, TOTAL: 208 MG/DL (ref 0–199)
CO2 SERPL-SCNC: 20 MMOL/L (ref 22–29)
CREAT SERPL-MCNC: 0.8 MG/DL (ref 0.7–1.2)
EOSINOPHIL # BLD: 0.1 E9/L (ref 0.05–0.5)
EOSINOPHIL NFR BLD: 1.7 % (ref 0–6)
ERYTHROCYTE [DISTWIDTH] IN BLOOD BY AUTOMATED COUNT: 14.4 FL (ref 11.5–15)
FOLATE SERPL-MCNC: 18.2 NG/ML (ref 4.8–24.2)
GLUCOSE SERPL-MCNC: 100 MG/DL (ref 74–99)
HBA1C MFR BLD: 5.5 % (ref 4–5.6)
HCT VFR BLD AUTO: 44.6 % (ref 37–54)
HDLC SERPL-MCNC: 95 MG/DL
HGB BLD-MCNC: 14.8 G/DL (ref 12.5–16.5)
IMM GRANULOCYTES # BLD: 0.03 E9/L
IMM GRANULOCYTES NFR BLD: 0.5 % (ref 0–5)
LDLC SERPL CALC-MCNC: 104 MG/DL (ref 0–99)
LYMPHOCYTES # BLD: 1.55 E9/L (ref 1.5–4)
LYMPHOCYTES NFR BLD: 25.6 % (ref 20–42)
MCH RBC QN AUTO: 32.4 PG (ref 26–35)
MCHC RBC AUTO-ENTMCNC: 33.2 % (ref 32–34.5)
MCV RBC AUTO: 97.6 FL (ref 80–99.9)
MONOCYTES # BLD: 0.55 E9/L (ref 0.1–0.95)
MONOCYTES NFR BLD: 9.1 % (ref 2–12)
NEUTROPHILS # BLD: 3.74 E9/L (ref 1.8–7.3)
NEUTS SEG NFR BLD: 61.8 % (ref 43–80)
PLATELET # BLD AUTO: 320 E9/L (ref 130–450)
PMV BLD AUTO: 10.4 FL (ref 7–12)
POTASSIUM SERPL-SCNC: 4.5 MMOL/L (ref 3.5–5)
PROT SERPL-MCNC: 7.8 G/DL (ref 6.4–8.3)
RBC # BLD AUTO: 4.57 E12/L (ref 3.8–5.8)
SODIUM SERPL-SCNC: 138 MMOL/L (ref 132–146)
TRIGL SERPL-MCNC: 47 MG/DL (ref 0–149)
TSH SERPL-MCNC: 0.88 UIU/ML (ref 0.27–4.2)
VIT B12 SERPL-MCNC: 695 PG/ML (ref 211–946)
VITAMIN D 25-HYDROXY: 44 NG/ML (ref 30–100)
VLDLC SERPL CALC-MCNC: 9 MG/DL
WBC # BLD: 6.1 E9/L (ref 4.5–11.5)

## 2023-06-26 ENCOUNTER — OFFICE VISIT (OUTPATIENT)
Dept: FAMILY MEDICINE CLINIC | Age: 53
End: 2023-06-26
Payer: COMMERCIAL

## 2023-06-26 VITALS
HEIGHT: 72 IN | BODY MASS INDEX: 31.69 KG/M2 | HEART RATE: 85 BPM | RESPIRATION RATE: 18 BRPM | WEIGHT: 234 LBS | DIASTOLIC BLOOD PRESSURE: 80 MMHG | OXYGEN SATURATION: 99 % | SYSTOLIC BLOOD PRESSURE: 130 MMHG

## 2023-06-26 DIAGNOSIS — I10 BENIGN ESSENTIAL HTN: ICD-10-CM

## 2023-06-26 DIAGNOSIS — R10.11 RUQ PAIN: Primary | ICD-10-CM

## 2023-06-26 DIAGNOSIS — S32.010A CLOSED COMPRESSION FRACTURE OF L1 VERTEBRA, INITIAL ENCOUNTER (HCC): ICD-10-CM

## 2023-06-26 DIAGNOSIS — Z98.890 BACK PAIN WITH HISTORY OF SPINAL SURGERY: ICD-10-CM

## 2023-06-26 DIAGNOSIS — M77.50 BONE SPUR OF FOOT: ICD-10-CM

## 2023-06-26 DIAGNOSIS — S22.000A CLOSED COMPRESSION FRACTURE OF THORACIC VERTEBRA, INITIAL ENCOUNTER (HCC): ICD-10-CM

## 2023-06-26 DIAGNOSIS — E66.09 CLASS 1 OBESITY DUE TO EXCESS CALORIES WITHOUT SERIOUS COMORBIDITY WITH BODY MASS INDEX (BMI) OF 31.0 TO 31.9 IN ADULT: ICD-10-CM

## 2023-06-26 DIAGNOSIS — L98.9 SKIN LESION: ICD-10-CM

## 2023-06-26 DIAGNOSIS — M54.9 BACK PAIN WITH HISTORY OF SPINAL SURGERY: ICD-10-CM

## 2023-06-26 DIAGNOSIS — R53.82 CHRONIC FATIGUE: ICD-10-CM

## 2023-06-26 DIAGNOSIS — R73.01 IFG (IMPAIRED FASTING GLUCOSE): ICD-10-CM

## 2023-06-26 DIAGNOSIS — M10.9 GOUT, UNSPECIFIED CAUSE, UNSPECIFIED CHRONICITY, UNSPECIFIED SITE: ICD-10-CM

## 2023-06-26 DIAGNOSIS — R79.89 ABNORMAL LFTS: ICD-10-CM

## 2023-06-26 DIAGNOSIS — E78.5 HYPERLIPIDEMIA, UNSPECIFIED HYPERLIPIDEMIA TYPE: ICD-10-CM

## 2023-06-26 DIAGNOSIS — F41.9 ANXIETY: ICD-10-CM

## 2023-06-26 DIAGNOSIS — M51.16 LUMBAR DISC DISEASE WITH RADICULOPATHY: ICD-10-CM

## 2023-06-26 PROCEDURE — 3078F DIAST BP <80 MM HG: CPT | Performed by: FAMILY MEDICINE

## 2023-06-26 PROCEDURE — 99214 OFFICE O/P EST MOD 30 MIN: CPT | Performed by: FAMILY MEDICINE

## 2023-06-26 PROCEDURE — 3074F SYST BP LT 130 MM HG: CPT | Performed by: FAMILY MEDICINE

## 2023-06-26 RX ORDER — SEMAGLUTIDE 0.25 MG/.5ML
0.25 INJECTION, SOLUTION SUBCUTANEOUS
Qty: 1 ML | Refills: 4 | Status: SHIPPED | OUTPATIENT
Start: 2023-06-26

## 2023-06-26 RX ORDER — BUSPIRONE HYDROCHLORIDE 10 MG/1
10 TABLET ORAL 3 TIMES DAILY
Qty: 90 TABLET | Refills: 3 | Status: SHIPPED | OUTPATIENT
Start: 2023-06-26 | End: 2023-10-24

## 2023-06-26 RX ORDER — OXYCODONE AND ACETAMINOPHEN 10; 325 MG/1; MG/1
1 TABLET ORAL EVERY 4 HOURS PRN
Qty: 90 TABLET | Refills: 0 | Status: SHIPPED | OUTPATIENT
Start: 2023-06-26 | End: 2023-07-26

## 2023-06-26 SDOH — ECONOMIC STABILITY: INCOME INSECURITY: HOW HARD IS IT FOR YOU TO PAY FOR THE VERY BASICS LIKE FOOD, HOUSING, MEDICAL CARE, AND HEATING?: PATIENT DECLINED

## 2023-06-26 SDOH — ECONOMIC STABILITY: HOUSING INSECURITY
IN THE LAST 12 MONTHS, WAS THERE A TIME WHEN YOU DID NOT HAVE A STEADY PLACE TO SLEEP OR SLEPT IN A SHELTER (INCLUDING NOW)?: PATIENT REFUSED

## 2023-06-26 SDOH — ECONOMIC STABILITY: FOOD INSECURITY: WITHIN THE PAST 12 MONTHS, YOU WORRIED THAT YOUR FOOD WOULD RUN OUT BEFORE YOU GOT MONEY TO BUY MORE.: PATIENT DECLINED

## 2023-06-26 SDOH — ECONOMIC STABILITY: FOOD INSECURITY: WITHIN THE PAST 12 MONTHS, THE FOOD YOU BOUGHT JUST DIDN'T LAST AND YOU DIDN'T HAVE MONEY TO GET MORE.: PATIENT DECLINED

## 2023-06-26 ASSESSMENT — PATIENT HEALTH QUESTIONNAIRE - PHQ9
1. LITTLE INTEREST OR PLEASURE IN DOING THINGS: 0
SUM OF ALL RESPONSES TO PHQ QUESTIONS 1-9: 0
SUM OF ALL RESPONSES TO PHQ9 QUESTIONS 1 & 2: 0
SUM OF ALL RESPONSES TO PHQ QUESTIONS 1-9: 0
2. FEELING DOWN, DEPRESSED OR HOPELESS: 0
SUM OF ALL RESPONSES TO PHQ QUESTIONS 1-9: 0
SUM OF ALL RESPONSES TO PHQ QUESTIONS 1-9: 0

## 2023-07-04 ASSESSMENT — ENCOUNTER SYMPTOMS
CHEST TIGHTNESS: 0
EYE PAIN: 0
FACIAL SWELLING: 0
BLOOD IN STOOL: 0
BACK PAIN: 1
VOICE CHANGE: 0
TROUBLE SWALLOWING: 0
EYE DISCHARGE: 0
EYE ITCHING: 0
NAUSEA: 0
EYE REDNESS: 0
SHORTNESS OF BREATH: 0
SORE THROAT: 0
APNEA: 0
DIARRHEA: 0
COLOR CHANGE: 0
COUGH: 0
ABDOMINAL PAIN: 0
CHOKING: 0
STRIDOR: 0
ANAL BLEEDING: 0
PHOTOPHOBIA: 0
ABDOMINAL DISTENTION: 0
RHINORRHEA: 0
VOMITING: 0
RECTAL PAIN: 0
SINUS PRESSURE: 0
CONSTIPATION: 0
WHEEZING: 0

## 2023-07-12 ENCOUNTER — OFFICE VISIT (OUTPATIENT)
Dept: PODIATRY | Age: 53
End: 2023-07-12
Payer: COMMERCIAL

## 2023-07-12 VITALS — HEIGHT: 72 IN | WEIGHT: 225 LBS | BODY MASS INDEX: 30.48 KG/M2

## 2023-07-12 DIAGNOSIS — R26.2 DIFFICULTY WALKING: ICD-10-CM

## 2023-07-12 DIAGNOSIS — M89.8X7 EXOSTOSIS OF BONE OF FOOT: Primary | ICD-10-CM

## 2023-07-12 DIAGNOSIS — M79.672 LEFT FOOT PAIN: ICD-10-CM

## 2023-07-12 PROCEDURE — 99203 OFFICE O/P NEW LOW 30 MIN: CPT | Performed by: PODIATRIST

## 2023-07-12 NOTE — PROGRESS NOTES
Patient is in today for evaluation of left foot pain. Patient says he injured his foot about 2 years ago and says he has a spot on the outside of the foot that is painful.  Pcp is Elly Arreguin DO  Last ov 6/26/23

## 2023-07-12 NOTE — PROGRESS NOTES
23     Caterina Sood    : 1970 Sex: male   Age: 47 y.o. Patient was referred by: Eric Her DO  Patient's PCP/Provider is:  Eric Her DO    Subjective:    Patient seen today for evaluation regarding painful bone spur left fifth metatarsal region    Chief Complaint   Patient presents with    Foot Pain     Left foot        HPI: Patient stated that 23 years ago he jumped from his kitchen to his garage in a hurry and injured his left foot. Patient thought that he broke his fifth metatarsal at the time, but did not seek any medical treatment. Throughout the years patient has noticed increase spurring at the fracture site and continued symptoms with certain shoe gear and activities. Patient has tried multiple conservative care options with shoe gear modifications, without improvement noted. Patient presents today to discuss additional treatment options available for care. No other abnormalities noted. ROS:  Const: Positives and pertinent negatives as per HPI. Musculo: Denies symptoms other than stated above. Neuro: Denies symptoms other than stated above. Skin: Denies symptoms other than stated above. Current Medications:    Current Outpatient Medications:     Semaglutide-Weight Management (WEGOVY) 0.25 MG/0.5ML SOAJ SC injection, Inject 0.25 mg into the skin every 7 days, Disp: 1 mL, Rfl: 4    busPIRone (BUSPAR) 10 MG tablet, Take 1 tablet by mouth 3 times daily, Disp: 90 tablet, Rfl: 3    oxyCODONE-acetaminophen (PERCOCET)  MG per tablet, Take 1 tablet by mouth every 4 hours as needed for Pain for up to 30 days. , Disp: 90 tablet, Rfl: 0    indomethacin (INDOCIN) 50 MG capsule, Take 1 capsule by mouth 3 times daily as needed for Pain (with meals), Disp: 90 capsule, Rfl: 4    amLODIPine-benazepril (LOTREL) 10-20 MG per capsule, TAKE 1 CAPSULE DAILY, Disp: 90 capsule, Rfl: 3    tadalafil (CIALIS) 20 MG tablet, Take 1 tablet by mouth as needed for Erectile Dysfunction,

## 2023-09-29 DIAGNOSIS — R73.01 IFG (IMPAIRED FASTING GLUCOSE): ICD-10-CM

## 2023-09-29 DIAGNOSIS — R53.82 CHRONIC FATIGUE: ICD-10-CM

## 2023-09-29 DIAGNOSIS — E78.5 HYPERLIPIDEMIA, UNSPECIFIED HYPERLIPIDEMIA TYPE: ICD-10-CM

## 2023-10-02 ENCOUNTER — OFFICE VISIT (OUTPATIENT)
Dept: FAMILY MEDICINE CLINIC | Age: 53
End: 2023-10-02
Payer: COMMERCIAL

## 2023-10-02 VITALS
HEIGHT: 72 IN | HEART RATE: 73 BPM | OXYGEN SATURATION: 98 % | SYSTOLIC BLOOD PRESSURE: 122 MMHG | RESPIRATION RATE: 18 BRPM | DIASTOLIC BLOOD PRESSURE: 76 MMHG | BODY MASS INDEX: 30.34 KG/M2 | WEIGHT: 224 LBS

## 2023-10-02 DIAGNOSIS — Z98.890 BACK PAIN WITH HISTORY OF SPINAL SURGERY: ICD-10-CM

## 2023-10-02 DIAGNOSIS — F41.9 ANXIETY: ICD-10-CM

## 2023-10-02 DIAGNOSIS — M10.09 ACUTE IDIOPATHIC GOUT OF MULTIPLE SITES: ICD-10-CM

## 2023-10-02 DIAGNOSIS — S22.000A CLOSED COMPRESSION FRACTURE OF THORACIC VERTEBRA, INITIAL ENCOUNTER (HCC): ICD-10-CM

## 2023-10-02 DIAGNOSIS — M54.9 BACK PAIN WITH HISTORY OF SPINAL SURGERY: ICD-10-CM

## 2023-10-02 DIAGNOSIS — R10.33 PERIUMBILICAL ABDOMINAL PAIN: ICD-10-CM

## 2023-10-02 DIAGNOSIS — R10.31 RIGHT INGUINAL PAIN: ICD-10-CM

## 2023-10-02 DIAGNOSIS — S32.010A CLOSED COMPRESSION FRACTURE OF L1 VERTEBRA, INITIAL ENCOUNTER (HCC): ICD-10-CM

## 2023-10-02 DIAGNOSIS — R39.14 BENIGN PROSTATIC HYPERPLASIA WITH INCOMPLETE BLADDER EMPTYING: Primary | ICD-10-CM

## 2023-10-02 DIAGNOSIS — M51.16 LUMBAR DISC DISEASE WITH RADICULOPATHY: ICD-10-CM

## 2023-10-02 DIAGNOSIS — N40.1 BENIGN PROSTATIC HYPERPLASIA WITH INCOMPLETE BLADDER EMPTYING: Primary | ICD-10-CM

## 2023-10-02 DIAGNOSIS — R53.82 CHRONIC FATIGUE: ICD-10-CM

## 2023-10-02 DIAGNOSIS — R79.89 ABNORMAL LFTS: ICD-10-CM

## 2023-10-02 LAB
ABSOLUTE IMMATURE GRANULOCYTE: 0.03 K/UL (ref 0–0.58)
ALBUMIN SERPL-MCNC: 4.6 G/DL (ref 3.5–5.2)
ALP BLD-CCNC: 39 U/L (ref 40–129)
ALT SERPL-CCNC: 49 U/L (ref 0–40)
ANION GAP SERPL CALCULATED.3IONS-SCNC: 18 MMOL/L (ref 7–16)
AST SERPL-CCNC: 37 U/L (ref 0–39)
BASOPHILS ABSOLUTE: 0.08 K/UL (ref 0–0.2)
BASOPHILS RELATIVE PERCENT: 1 % (ref 0–2)
BILIRUB SERPL-MCNC: 0.9 MG/DL (ref 0–1.2)
BUN BLDV-MCNC: 13 MG/DL (ref 6–20)
CALCIUM SERPL-MCNC: 9.3 MG/DL (ref 8.6–10.2)
CHLORIDE BLD-SCNC: 102 MMOL/L (ref 98–107)
CHOLESTEROL: 198 MG/DL
CO2: 20 MMOL/L (ref 22–29)
CREAT SERPL-MCNC: 0.8 MG/DL (ref 0.7–1.2)
EOSINOPHILS ABSOLUTE: 0.22 K/UL (ref 0.05–0.5)
EOSINOPHILS RELATIVE PERCENT: 3 % (ref 0–6)
GFR SERPL CREATININE-BSD FRML MDRD: >60 ML/MIN/1.73M2
GLUCOSE BLD-MCNC: 103 MG/DL (ref 74–99)
HBA1C MFR BLD: 5 % (ref 4–5.6)
HCT VFR BLD CALC: 42.9 % (ref 37–54)
HDLC SERPL-MCNC: 91 MG/DL
HEMOGLOBIN: 14.3 G/DL (ref 12.5–16.5)
IMMATURE GRANULOCYTES: 1 % (ref 0–5)
LDL CHOLESTEROL: 97 MG/DL
LYMPHOCYTES ABSOLUTE: 1.99 K/UL (ref 1.5–4)
LYMPHOCYTES RELATIVE PERCENT: 31 % (ref 20–42)
MCH RBC QN AUTO: 32.9 PG (ref 26–35)
MCHC RBC AUTO-ENTMCNC: 33.3 G/DL (ref 32–34.5)
MCV RBC AUTO: 98.6 FL (ref 80–99.9)
MONOCYTES ABSOLUTE: 0.62 K/UL (ref 0.1–0.95)
MONOCYTES RELATIVE PERCENT: 10 % (ref 2–12)
NEUTROPHILS ABSOLUTE: 3.54 K/UL (ref 1.8–7.3)
NEUTROPHILS RELATIVE PERCENT: 55 % (ref 43–80)
PDW BLD-RTO: 13.6 % (ref 11.5–15)
PLATELET # BLD: 333 K/UL (ref 130–450)
PMV BLD AUTO: 9.6 FL (ref 7–12)
POTASSIUM SERPL-SCNC: 4.2 MMOL/L (ref 3.5–5)
RBC # BLD: 4.35 M/UL (ref 3.8–5.8)
SODIUM BLD-SCNC: 140 MMOL/L (ref 132–146)
T4 FREE: 1.2 NG/DL (ref 0.9–1.7)
TOTAL PROTEIN: 7.6 G/DL (ref 6.4–8.3)
TRIGL SERPL-MCNC: 48 MG/DL
TSH SERPL DL<=0.05 MIU/L-ACNC: 0.82 UIU/ML (ref 0.27–4.2)
VLDLC SERPL CALC-MCNC: 10 MG/DL
WBC # BLD: 6.5 K/UL (ref 4.5–11.5)

## 2023-10-02 PROCEDURE — 3078F DIAST BP <80 MM HG: CPT | Performed by: FAMILY MEDICINE

## 2023-10-02 PROCEDURE — 3074F SYST BP LT 130 MM HG: CPT | Performed by: FAMILY MEDICINE

## 2023-10-02 PROCEDURE — 99214 OFFICE O/P EST MOD 30 MIN: CPT | Performed by: FAMILY MEDICINE

## 2023-10-02 RX ORDER — BUSPIRONE HYDROCHLORIDE 30 MG/1
30 TABLET ORAL 2 TIMES DAILY
Qty: 180 TABLET | Refills: 3 | Status: SHIPPED | OUTPATIENT
Start: 2023-10-02

## 2023-10-02 RX ORDER — ALFUZOSIN HYDROCHLORIDE 10 MG/1
10 TABLET, EXTENDED RELEASE ORAL DAILY
Qty: 30 TABLET | Refills: 3 | Status: SHIPPED | OUTPATIENT
Start: 2023-10-02

## 2023-10-02 RX ORDER — INDOMETHACIN 50 MG/1
50 CAPSULE ORAL 3 TIMES DAILY PRN
Qty: 90 CAPSULE | Refills: 4 | Status: CANCELLED | OUTPATIENT
Start: 2023-10-02

## 2023-10-02 RX ORDER — OXYCODONE AND ACETAMINOPHEN 10; 325 MG/1; MG/1
1 TABLET ORAL EVERY 4 HOURS PRN
Qty: 90 TABLET | Refills: 0 | Status: SHIPPED
Start: 2023-10-02 | End: 2023-10-03

## 2023-10-02 NOTE — PROGRESS NOTES
/ Plan:   Sulma Desir was seen today for 3 month follow-up. Diagnoses and all orders for this visit:    Benign prostatic hyperplasia with incomplete bladder emptying  -     alfuzosin (UROXATRAL) 10 MG extended release tablet; Take 1 tablet by mouth daily    Lumbar disc disease with radiculopathy  -     Discontinue: oxyCODONE-acetaminophen (PERCOCET)  MG per tablet; Take 1 tablet by mouth every 4 hours as needed for Pain for up to 30 days. -     oxyCODONE-acetaminophen (PERCOCET)  MG per tablet; Take 1 tablet by mouth every 4 hours as needed for Pain for up to 90 days. Max Daily Amount: 6 tablets    Acute idiopathic gout of multiple sites    Right inguinal pain  -     CT ABDOMEN PELVIS W IV CONTRAST Additional Contrast? None; Future    Periumbilical abdominal pain  -     CT ABDOMEN PELVIS W IV CONTRAST Additional Contrast? None; Future    Anxiety  -     busPIRone (BUSPAR) 30 MG tablet; Take 30 mg by mouth in the morning and at bedtime    Back pain with history of spinal surgery  -     Discontinue: oxyCODONE-acetaminophen (PERCOCET)  MG per tablet; Take 1 tablet by mouth every 4 hours as needed for Pain for up to 30 days. -     oxyCODONE-acetaminophen (PERCOCET)  MG per tablet; Take 1 tablet by mouth every 4 hours as needed for Pain for up to 90 days. Max Daily Amount: 6 tablets    Closed compression fracture of thoracic vertebra, initial encounter ([address])  -     Discontinue: oxyCODONE-acetaminophen (PERCOCET)  MG per tablet; Take 1 tablet by mouth every 4 hours as needed for Pain for up to 30 days. -     oxyCODONE-acetaminophen (PERCOCET)  MG per tablet; Take 1 tablet by mouth every 4 hours as needed for Pain for up to 90 days. Max Daily Amount: 6 tablets    Closed compression fracture of L1 vertebra, initial encounter ([facility])  -     Discontinue: oxyCODONE-acetaminophen (PERCOCET)  MG per tablet; Take 1 tablet by mouth every 4 hours as needed for Pain for up to 30 days.   -

## 2023-10-03 RX ORDER — OXYCODONE AND ACETAMINOPHEN 10; 325 MG/1; MG/1
1 TABLET ORAL EVERY 4 HOURS PRN
Qty: 90 TABLET | Refills: 0
Start: 2023-10-03 | End: 2024-01-01

## 2023-10-03 ASSESSMENT — ENCOUNTER SYMPTOMS
SORE THROAT: 0
RECTAL PAIN: 0
CONSTIPATION: 0
COUGH: 0
APNEA: 0
NAUSEA: 0
WHEEZING: 0
BLOOD IN STOOL: 0
STRIDOR: 0
EYE PAIN: 0
CHEST TIGHTNESS: 0
COLOR CHANGE: 0
ABDOMINAL PAIN: 1
VOMITING: 0
TROUBLE SWALLOWING: 0
VOICE CHANGE: 0
FACIAL SWELLING: 0
SINUS PRESSURE: 0
PHOTOPHOBIA: 0
CHOKING: 0
ANAL BLEEDING: 0
EYE ITCHING: 0
BACK PAIN: 1
RHINORRHEA: 0
EYE DISCHARGE: 0
ABDOMINAL DISTENTION: 0
EYE REDNESS: 0
DIARRHEA: 0
SHORTNESS OF BREATH: 0

## 2023-10-04 ENCOUNTER — OFFICE VISIT (OUTPATIENT)
Dept: PODIATRY | Age: 53
End: 2023-10-04
Payer: COMMERCIAL

## 2023-10-04 VITALS — HEIGHT: 72 IN | BODY MASS INDEX: 29.8 KG/M2 | WEIGHT: 220 LBS

## 2023-10-04 DIAGNOSIS — M79.672 LEFT FOOT PAIN: ICD-10-CM

## 2023-10-04 DIAGNOSIS — M89.8X7 EXOSTOSIS OF BONE OF FOOT: Primary | ICD-10-CM

## 2023-10-04 DIAGNOSIS — R26.2 DIFFICULTY WALKING: ICD-10-CM

## 2023-10-04 PROCEDURE — 99213 OFFICE O/P EST LOW 20 MIN: CPT | Performed by: PODIATRIST

## 2023-10-04 NOTE — PROGRESS NOTES
10/4/23     Shahriar Sanchez    : 1970   Sex: male    Age: 47 y.o. Patient's PCP/Provider is:  Bev Arzola DO    Subjective:  Patient is seen today for follow-up regarding continued care regarding painful exostosis left foot. Patient stated he still getting issues with every day activities from shoe gear irritation to the left lateral midfoot region. Patient has tried and failed multiple conservative care options at this time over the years. He presented today to discuss outpatient surgical intervention. No other additional abnormalities noted. Chief Complaint   Patient presents with    Follow-up     Discuss surgery        ROS:  Const: Positives and pertinent negatives as per HPI. Musculo: Denies symptoms other than stated above. Neuro: Denies symptoms other than stated above. Skin: Denies symptoms other than stated above. Current Medications:    Current Outpatient Medications:     oxyCODONE-acetaminophen (PERCOCET)  MG per tablet, Take 1 tablet by mouth every 4 hours as needed for Pain for up to 90 days.  Max Daily Amount: 6 tablets, Disp: 90 tablet, Rfl: 0    alfuzosin (UROXATRAL) 10 MG extended release tablet, Take 1 tablet by mouth daily, Disp: 30 tablet, Rfl: 3    busPIRone (BUSPAR) 30 MG tablet, Take 30 mg by mouth in the morning and at bedtime, Disp: 180 tablet, Rfl: 3    Semaglutide-Weight Management (WEGOVY) 0.25 MG/0.5ML SOAJ SC injection, Inject 0.25 mg into the skin every 7 days, Disp: 1 mL, Rfl: 4    indomethacin (INDOCIN) 50 MG capsule, Take 1 capsule by mouth 3 times daily as needed for Pain (with meals), Disp: 90 capsule, Rfl: 4    amLODIPine-benazepril (LOTREL) 10-20 MG per capsule, TAKE 1 CAPSULE DAILY, Disp: 90 capsule, Rfl: 3    tadalafil (CIALIS) 20 MG tablet, Take 1 tablet by mouth as needed for Erectile Dysfunction, Disp: 30 tablet, Rfl: 5    valACYclovir (VALTREX) 500 MG tablet, Take 1 tablet by mouth 2 times daily, Disp: 60 tablet, Rfl: 5    EPINEPHrine

## 2023-10-06 ENCOUNTER — TELEPHONE (OUTPATIENT)
Dept: FAMILY MEDICINE CLINIC | Age: 53
End: 2023-10-06

## 2023-10-06 NOTE — TELEPHONE ENCOUNTER
----- Message from Alice Orellana sent at 10/6/2023 12:43 PM EDT -----  Subject: Message to Provider    QUESTIONS  Information for Provider? Patient wants to know what the pre op will   consist of on 10/20. Has given blood recently and didn't know if he needs   it again.  ---------------------------------------------------------------------------  --------------  Stephy MORRIS  [phone]; OK to leave message on voicemail  ---------------------------------------------------------------------------  --------------  SCRIPT ANSWERS  Relationship to Patient?  Self

## 2023-10-10 DIAGNOSIS — Z01.818 PRE-OP EVALUATION: Primary | ICD-10-CM

## 2023-10-16 ENCOUNTER — TELEPHONE (OUTPATIENT)
Dept: PODIATRY | Age: 53
End: 2023-10-16

## 2023-10-16 PROBLEM — M89.8X7 EXOSTOSIS OF LEFT FOOT: Status: ACTIVE | Noted: 2023-10-16

## 2023-10-16 PROBLEM — M79.672 PAIN IN LEFT FOOT: Status: ACTIVE | Noted: 2023-10-16

## 2023-10-16 NOTE — TELEPHONE ENCOUNTER
Prior Authorization Form:      DEMOGRAPHICS:                     Patient Name:  Michelle Back  Patient :  1970            Insurance:  Payor: Kala Carrier / Plan: Kala Carrier - [place] PPO / Product Type: *No Product type* /   Insurance ID Number:    Payer/Plan Subscr  Sex Relation Sub. Ins. ID Effective Group Num   1. [id number] [facility] 1970 Male Self [id number]                                    [id number]         DIAGNOSIS & PROCEDURE:                       Procedure/Operation: Resection Exostosis Left Foot           CPT Code: 83435    Diagnosis:  exostosis left foot, pain in left foot    ICD10 Code: M89.8X7, M79.672    Location:  [facility]    Surgeon:  Dr. Law Borges INFORMATION:                          Date: 23    Time: TBD              Anesthesia:  MAC/TIVA                                                       Status:  Outpatient        Special Comments:  None.         Electronically signed by rTacey Tapia LPN on  at 10:40 AM

## 2023-10-20 ENCOUNTER — OFFICE VISIT (OUTPATIENT)
Dept: FAMILY MEDICINE CLINIC | Age: 53
End: 2023-10-20
Payer: COMMERCIAL

## 2023-10-20 VITALS
OXYGEN SATURATION: 97 % | TEMPERATURE: 98 F | WEIGHT: 227.3 LBS | RESPIRATION RATE: 16 BRPM | BODY MASS INDEX: 30.79 KG/M2 | HEIGHT: 72 IN | SYSTOLIC BLOOD PRESSURE: 126 MMHG | DIASTOLIC BLOOD PRESSURE: 86 MMHG | HEART RATE: 108 BPM

## 2023-10-20 DIAGNOSIS — Z01.818 PRE-OP EXAMINATION: ICD-10-CM

## 2023-10-20 DIAGNOSIS — M10.9 GOUT, UNSPECIFIED CAUSE, UNSPECIFIED CHRONICITY, UNSPECIFIED SITE: ICD-10-CM

## 2023-10-20 DIAGNOSIS — I10 BENIGN ESSENTIAL HTN: ICD-10-CM

## 2023-10-20 DIAGNOSIS — M89.8X7 EXOSTOSIS OF LEFT FOOT: Primary | ICD-10-CM

## 2023-10-20 PROCEDURE — 3079F DIAST BP 80-89 MM HG: CPT | Performed by: FAMILY MEDICINE

## 2023-10-20 PROCEDURE — 90674 CCIIV4 VAC NO PRSV 0.5 ML IM: CPT | Performed by: FAMILY MEDICINE

## 2023-10-20 PROCEDURE — 3074F SYST BP LT 130 MM HG: CPT | Performed by: FAMILY MEDICINE

## 2023-10-20 PROCEDURE — 93000 ELECTROCARDIOGRAM COMPLETE: CPT | Performed by: FAMILY MEDICINE

## 2023-10-20 PROCEDURE — 90471 IMMUNIZATION ADMIN: CPT | Performed by: FAMILY MEDICINE

## 2023-10-20 PROCEDURE — 99214 OFFICE O/P EST MOD 30 MIN: CPT | Performed by: FAMILY MEDICINE

## 2023-10-20 NOTE — PROGRESS NOTES
[id number] [address], [address] [facility]  Phone: [phone]    Preoperative Visit    10/20/2023    Chief Complaint   Patient presents with    Pre-op Exam     Pt is having surgery on left foot with Dr. Sanjeev Reardon on 11/2/2023       HPI    Tra Webb is a 47 y.o. patient that presents today for preoperative visit as below:    Planned procedure/surgeon: Resection Exostosis Left Foot, with Dr. Sanjeev Reardon  Date: 11/2/23    EKG:  Yes   Heart Disease: No  Recent Stress No  Lung Disease:  No Pulmonologist No  Comorbidities:  hypertension and male gender     Assessment of Metabolic Equivalents >33 METS  She Can take care of self, such as eat, dress, or use the toilet. ? Can walk up a flight of steps or a hill or walk on level ground at 3 to 4 mph (4 METs)   ? Can do heavy work around the house, such as scrubbing floors or lifting or moving heavy furniture, or climb two flights of stairs (between 4 and 10 METs)   ? Can participate in strenuous sports such as swimming, singles tennis, football, basketball, and skiing(>10 METs)      Revised Cardiac Risk Index  High risk surgery: No  Coronary artery disease: No  CongestiveHeart Failure: No  Cerbrovascular Disease: No  Diabetes Mellitus on insulin: No  Serum Creatinine Greater than 2: No     Estimated Risk of Adverse Outcome with Non-Cardiac Surgery:  Estimated 30 day Rate of Myocardial Infarction, Pulmonary Edema, Ventricular Fibrillation, Cardiac Arrest, or Complete Heart Block: Class I risk 3.9%    Medications needing held?: Talk to Dr. Sanjeev Reardon, may need to hold indomethacin  Aversion to blood products: Yes  Tobacco/alcohol/ROBERTO: occasional alcohol  A1c:   Hemoglobin A1C   Date Value Ref Range Status   09/29/2023 5.0 4.0 - 5.6 % Final      Patient's past medical, surgical, social and/or family history reviewed, updated in chart, and are non-contributory (unless otherwise stated).   Medications and allergies also reviewed and updated in

## 2023-10-28 DIAGNOSIS — F41.9 ANXIETY: ICD-10-CM

## 2023-10-30 RX ORDER — BUSPIRONE HYDROCHLORIDE 10 MG/1
10 TABLET ORAL 3 TIMES DAILY
Qty: 90 TABLET | Refills: 3 | OUTPATIENT
Start: 2023-10-30

## 2023-11-01 ENCOUNTER — ANESTHESIA EVENT (OUTPATIENT)
Dept: OPERATING ROOM | Age: 53
End: 2023-11-01
Payer: COMMERCIAL

## 2023-11-02 ENCOUNTER — ANESTHESIA (OUTPATIENT)
Dept: OPERATING ROOM | Age: 53
End: 2023-11-02
Payer: COMMERCIAL

## 2023-11-02 ENCOUNTER — HOSPITAL ENCOUNTER (OUTPATIENT)
Age: 53
Setting detail: OUTPATIENT SURGERY
Discharge: HOME | End: 2023-11-02
Attending: PODIATRIST | Admitting: PODIATRIST
Payer: COMMERCIAL

## 2023-11-02 ENCOUNTER — HOSPITAL ENCOUNTER (OUTPATIENT)
Dept: OPERATING ROOM | Age: 53
Setting detail: OUTPATIENT SURGERY
Discharge: HOME | End: 2023-11-02
Attending: PODIATRIST
Payer: COMMERCIAL

## 2023-11-02 VITALS
OXYGEN SATURATION: 98 % | TEMPERATURE: 98.5 F | BODY MASS INDEX: 30.75 KG/M2 | WEIGHT: 227 LBS | SYSTOLIC BLOOD PRESSURE: 119 MMHG | HEART RATE: 72 BPM | RESPIRATION RATE: 16 BRPM | DIASTOLIC BLOOD PRESSURE: 70 MMHG | HEIGHT: 72 IN

## 2023-11-02 DIAGNOSIS — M89.8X7 EXOSTOSIS OF LEFT FOOT: ICD-10-CM

## 2023-11-02 DIAGNOSIS — M79.672 PAIN IN LEFT FOOT: ICD-10-CM

## 2023-11-02 PROCEDURE — 2709999900 HC NON-CHARGEABLE SUPPLY: Performed by: PODIATRIST

## 2023-11-02 PROCEDURE — 2500000003 HC RX 250 WO HCPCS

## 2023-11-02 PROCEDURE — 6360000002 HC RX W HCPCS: Performed by: PODIATRIST

## 2023-11-02 PROCEDURE — 3700000001 HC ADD 15 MINUTES (ANESTHESIA): Performed by: PODIATRIST

## 2023-11-02 PROCEDURE — 3600000002 HC SURGERY LEVEL 2 BASE: Performed by: PODIATRIST

## 2023-11-02 PROCEDURE — 6360000002 HC RX W HCPCS

## 2023-11-02 PROCEDURE — 2580000003 HC RX 258: Performed by: ANESTHESIOLOGY

## 2023-11-02 PROCEDURE — 3600000012 HC SURGERY LEVEL 2 ADDTL 15MIN: Performed by: PODIATRIST

## 2023-11-02 PROCEDURE — 3700000000 HC ANESTHESIA ATTENDED CARE: Performed by: PODIATRIST

## 2023-11-02 PROCEDURE — 2720000010 HC SURG SUPPLY STERILE: Performed by: PODIATRIST

## 2023-11-02 PROCEDURE — 88304 TISSUE EXAM BY PATHOLOGIST: CPT

## 2023-11-02 PROCEDURE — 88311 DECALCIFY TISSUE: CPT

## 2023-11-02 PROCEDURE — 7100000010 HC PHASE II RECOVERY - FIRST 15 MIN: Performed by: PODIATRIST

## 2023-11-02 PROCEDURE — 7100000011 HC PHASE II RECOVERY - ADDTL 15 MIN: Performed by: PODIATRIST

## 2023-11-02 PROCEDURE — 28122 PARTIAL REMOVAL OF FOOT BONE: CPT | Performed by: PODIATRIST

## 2023-11-02 PROCEDURE — 2580000003 HC RX 258: Performed by: PODIATRIST

## 2023-11-02 RX ORDER — MIDAZOLAM HYDROCHLORIDE 1 MG/ML
INJECTION INTRAMUSCULAR; INTRAVENOUS PRN
Status: DISCONTINUED | OUTPATIENT
Start: 2023-11-02 | End: 2023-11-02 | Stop reason: SDUPTHER

## 2023-11-02 RX ORDER — SODIUM CHLORIDE 9 MG/ML
INJECTION, SOLUTION INTRAVENOUS PRN
Status: DISCONTINUED | OUTPATIENT
Start: 2023-11-02 | End: 2023-11-02 | Stop reason: HOSPADM

## 2023-11-02 RX ORDER — KETOROLAC TROMETHAMINE 30 MG/ML
INJECTION, SOLUTION INTRAMUSCULAR; INTRAVENOUS PRN
Status: DISCONTINUED | OUTPATIENT
Start: 2023-11-02 | End: 2023-11-02 | Stop reason: SDUPTHER

## 2023-11-02 RX ORDER — SODIUM CHLORIDE, SODIUM LACTATE, POTASSIUM CHLORIDE, CALCIUM CHLORIDE 600; 310; 30; 20 MG/100ML; MG/100ML; MG/100ML; MG/100ML
INJECTION, SOLUTION INTRAVENOUS CONTINUOUS
Status: DISCONTINUED | OUTPATIENT
Start: 2023-11-02 | End: 2023-11-02 | Stop reason: HOSPADM

## 2023-11-02 RX ORDER — DIPHENHYDRAMINE HYDROCHLORIDE 50 MG/ML
INJECTION INTRAMUSCULAR; INTRAVENOUS PRN
Status: DISCONTINUED | OUTPATIENT
Start: 2023-11-02 | End: 2023-11-02 | Stop reason: SDUPTHER

## 2023-11-02 RX ORDER — ONDANSETRON 2 MG/ML
INJECTION INTRAMUSCULAR; INTRAVENOUS PRN
Status: DISCONTINUED | OUTPATIENT
Start: 2023-11-02 | End: 2023-11-02 | Stop reason: SDUPTHER

## 2023-11-02 RX ORDER — KETAMINE HCL IN NACL, ISO-OSM 100MG/10ML
SYRINGE (ML) INJECTION PRN
Status: DISCONTINUED | OUTPATIENT
Start: 2023-11-02 | End: 2023-11-02 | Stop reason: SDUPTHER

## 2023-11-02 RX ORDER — BUPIVACAINE HYDROCHLORIDE 5 MG/ML
INJECTION, SOLUTION PERINEURAL PRN
Status: DISCONTINUED | OUTPATIENT
Start: 2023-11-02 | End: 2023-11-02 | Stop reason: ALTCHOICE

## 2023-11-02 RX ORDER — FENTANYL CITRATE 50 UG/ML
INJECTION, SOLUTION INTRAMUSCULAR; INTRAVENOUS PRN
Status: DISCONTINUED | OUTPATIENT
Start: 2023-11-02 | End: 2023-11-02 | Stop reason: SDUPTHER

## 2023-11-02 RX ORDER — PROPOFOL 10 MG/ML
INJECTION, EMULSION INTRAVENOUS CONTINUOUS PRN
Status: DISCONTINUED | OUTPATIENT
Start: 2023-11-02 | End: 2023-11-02 | Stop reason: SDUPTHER

## 2023-11-02 RX ORDER — SODIUM CHLORIDE 0.9 % (FLUSH) 0.9 %
5-40 SYRINGE (ML) INJECTION EVERY 12 HOURS SCHEDULED
Status: DISCONTINUED | OUTPATIENT
Start: 2023-11-02 | End: 2023-11-02 | Stop reason: HOSPADM

## 2023-11-02 RX ORDER — SODIUM CHLORIDE 0.9 % (FLUSH) 0.9 %
5-40 SYRINGE (ML) INJECTION PRN
Status: DISCONTINUED | OUTPATIENT
Start: 2023-11-02 | End: 2023-11-02 | Stop reason: HOSPADM

## 2023-11-02 RX ORDER — LIDOCAINE HYDROCHLORIDE 20 MG/ML
INJECTION, SOLUTION EPIDURAL; INFILTRATION; INTRACAUDAL; PERINEURAL PRN
Status: DISCONTINUED | OUTPATIENT
Start: 2023-11-02 | End: 2023-11-02 | Stop reason: SDUPTHER

## 2023-11-02 RX ADMIN — FENTANYL CITRATE 50 MCG: 50 INJECTION INTRAMUSCULAR; INTRAVENOUS at 11:41

## 2023-11-02 RX ADMIN — WATER 2000 MG: 1 INJECTION INTRAMUSCULAR; INTRAVENOUS; SUBCUTANEOUS at 11:38

## 2023-11-02 RX ADMIN — KETOROLAC TROMETHAMINE 30 MG: 30 INJECTION, SOLUTION INTRAMUSCULAR; INTRAVENOUS at 11:46

## 2023-11-02 RX ADMIN — ONDANSETRON 4 MG: 2 INJECTION INTRAMUSCULAR; INTRAVENOUS at 11:43

## 2023-11-02 RX ADMIN — DIPHENHYDRAMINE HYDROCHLORIDE 12.5 MG: 50 INJECTION, SOLUTION INTRAMUSCULAR; INTRAVENOUS at 11:45

## 2023-11-02 RX ADMIN — Medication 20 MG: at 11:41

## 2023-11-02 RX ADMIN — PROPOFOL 100 MCG/KG/MIN: 10 INJECTION, EMULSION INTRAVENOUS at 11:41

## 2023-11-02 RX ADMIN — MIDAZOLAM 2 MG: 1 INJECTION INTRAMUSCULAR; INTRAVENOUS at 11:40

## 2023-11-02 RX ADMIN — LIDOCAINE HYDROCHLORIDE 50 MG: 20 INJECTION, SOLUTION EPIDURAL; INFILTRATION; INTRACAUDAL; PERINEURAL at 11:41

## 2023-11-02 RX ADMIN — SODIUM CHLORIDE, POTASSIUM CHLORIDE, SODIUM LACTATE AND CALCIUM CHLORIDE: 600; 310; 30; 20 INJECTION, SOLUTION INTRAVENOUS at 10:35

## 2023-11-02 ASSESSMENT — PAIN - FUNCTIONAL ASSESSMENT: PAIN_FUNCTIONAL_ASSESSMENT: 0-10

## 2023-11-02 ASSESSMENT — PAIN SCALES - GENERAL
PAINLEVEL_OUTOF10: 0

## 2023-11-02 NOTE — ANESTHESIA POSTPROCEDURE EVALUATION
Department of Anesthesiology  Postprocedure Note    Patient: Shazia Cope  MRN: [id number]  YOB: 1970  Date of evaluation: 11/2/2023      Procedure Summary     Date: 11/02/23 Room / Location: [address] [place] [address] / [facility] ([facility])    Anesthesia Start: 1137 Anesthesia Stop: 1209    Procedure: Resection Exostosis Left Foot (Left) Diagnosis:       Exostosis of left foot      Pain in left foot      (Exostosis of left foot [M89.8X7])      (Pain in left foot [M79.672])    Surgeons: Trey Bernabe DPM Responsible Provider: Sage Avitia MD    Anesthesia Type: MAC ASA Status: 3          Anesthesia Type: MAC    Mulu Phase I:      Mulu Phase II: Mulu Score: 10      Anesthesia Post Evaluation    Patient location during evaluation: PACU  Patient participation: complete - patient participated  Level of consciousness: awake and alert  Airway patency: patent  Nausea & Vomiting: no nausea and no vomiting  Complications: no  Cardiovascular status: hemodynamically stable  Respiratory status: acceptable  Hydration status: euvolemic  Pain management: satisfactory to patient

## 2023-11-02 NOTE — OP NOTE
Operative Note      Patient: Torin Early  YOB: 1970  MRN: [id number]    Date of Procedure: 11/2/2023    Pre-Op Diagnosis Codes:     * Exostosis of left foot [M89.8X7]     * Pain in left foot [M79.672]    Post-Op Diagnosis: Same       Procedure(s):  Resection Exostosis Left Foot    Surgeon(s):  Edmund Wayne DPM    Assistant:   * No surgical staff found *    Anesthesia: Monitor Anesthesia Care    Estimated Blood Loss (mL): Minimal    Complications: None    Specimens:   ID Type Source Tests Collected by Time Destination   A : EXOSTOSIS LEFT FOOT Tissue Tissue SURGICAL PATHOLOGY Edmund Wayne DPM 11/2/2023 1153        Implants:  * No implants in log *      Drains: * No LDAs found *    Findings: Chronic exostosis left fifth metatarsal        Detailed Description of Procedure:   After proper preoperative evaluation, patient was brought back to the operating placed operative table in the supine position. Monitored anesthesia was administered per anesthesia department. Patient did receive 2 g intravenous Ancef preoperatively. We did utilize a total of 10 cc of 0.5% Marcaine plain to anesthetize surgical site left foot. Tourniquet was placed around the patient's well-padded left ankle inflated to 250 mils mercury then lowered to the surgical field once proper prepping and draping was performed. Attention was directed towards the dorsal/lateral fifth metatarsal base region where at this time the exostosis was properly noted with fluoroscopic imaging. At this time approximately a 2 cm linear incision was made overlying the exostosis site. Incision was carried down through subcutaneous tissue layers all vital structures were bovied and retracted as necessary. At this time deep fascial incision was made to gain access to the exostosis present.   At this time with use of an osteotome and mallet and bone rongeur the exostosis was completely removed from the dorsal/lateral surface of

## 2023-11-02 NOTE — H&P
Update History & Physical     The patient's History and Physical this morning was reviewed with the patient and there were no significant changes. I examined the patient and there were no significant changes from the previous History and Physical.     Plan: The risk, benefits, expected outcome, and alternative to the recommended procedure have been discussed with the patient. Patient understands and wants to proceed with the procedure. The procedure discussed is 1.  Resection exostosis left foot        Electronically signed by Genaro Downey DPM on 11/2/2023 at 11:35 AM

## 2023-11-02 NOTE — DISCHARGE INSTRUCTIONS
DR. Jimenez [facility] AMBULATORY PROCEDURE DISCHARGE INSTRUCTIONS  You may be drowsy or lightheaded after receiving sedation or anesthesia. A responsible person should be with you for the next 24 hours. Please follow the instructions checked below:    DIET INSTRUCTIONS:  [x]Start with light diet and progress to your normal diet as you feel like eating. If you experience nausea or repeated episodes of vomiting which persist beyond 12-24 hours, notify your doctor. []Other     ACTIVITY INSTRUCTIONS:  [x]Rest today. Increase activity as tolerated    [x]Elevate operative limb   []Sling to operative limb  []No heavy lifting or strenuous activity     []No driving for   []Use crutches  []Use walker   [x]Weight bearing: []none /  [x]partial / []full as tolerated   []Other     WOUND/DRESSING INSTRUCTIONS:  Always ensure you and your care giver clean hands before and after caring for the wound. []May shower      []May bathe      [x]Keep dressing dry            [x]Do not remove dressing   []Remove dressing on     []Leave steri strips in place    []Drain care      [x] Ice to operative site for 15-30 minutes of each hour while awake for 24-36 hours  []Use ice cooling system as instructed                  [x]Post-op Shoe-wear when up and about                     []Other      MEDICATION INSTRUCTIONS:    []Prescriptions sent with you. Use as directed. When taking pain medications, you may experience dizziness or drowsiness. Do not drink alcohol or drive when taking these medications. [x]You may take a non-prescription anti-inflammatory medication as needed for pain relief. [x]Give the list of your medications to your primary care physician on your next visit. Keep your med list updated and carry it with in case of emergency  Other Instructions:        FOLLOW-UP CARE:  [x]Call the office at [phone] with any questions or concerns prior to your scheduled postoperative appointment.   Watch for these significant vomit. Sometimes anesthesia can make you feel sick. It's a common side effect and often doesn't last long. Pain also can make you feel sick or vomit. After the anesthesia wears off, you may feel pain from the incision (cut). That pain could then upset your stomach. Taking pain medicine can also make you feel sick to your stomach. Whatever the cause, you may get medicine that can help. There are also some things you can do at home to prevent nausea and feel better. The doctor has checked you carefully, but problems can develop later. If you notice any problems or new symptoms, get medical treatment right away. Follow-up care is a key part of your treatment and safety. Be sure to make and go to all appointments, and call your doctor if you are having problems. It's also a good idea to know your test results and keep a list of the medicines you take. How can you care for yourself at home? Be safe with medicines. Read and follow all instructions on the label. If the doctor gave you a prescription medicine for pain, take it as prescribed. If you are not taking a prescription pain medicine, ask your doctor if you can take an over-the-counter medicine. Take your pain medicine as soon as you have pain. It works better if you take it before the pain gets bad. Call your doctor if you have any problems with your medicine. Rest in bed until you feel better. To prevent dehydration, drink plenty of fluids. Choose water and other clear liquids until you feel better. If you have kidney, heart, or liver disease and have to limit fluids, talk with your doctor before you increase the amount of fluids you drink. When you are able to eat, try clear soups, mild foods, and liquids until all symptoms are gone for 12 to 48 hours. Other good choices include dry toast, crackers, cooked cereal, and gelatin dessert, such as Jell-O. Do not smoke. Smoking and being around smoke can make nausea worse.  If you need help quitting, talk

## 2023-11-03 ENCOUNTER — TELEPHONE (OUTPATIENT)
Dept: PODIATRY | Age: 53
End: 2023-11-03

## 2023-11-03 NOTE — TELEPHONE ENCOUNTER
Call to patient for a well check after left foot surgery on 11/02/23. Patient states everything is going well, he has no issues or concerns. Advised pt to call our office if needed. Patient acknowledged and thanked.

## 2023-11-07 ENCOUNTER — OFFICE VISIT (OUTPATIENT)
Dept: PODIATRY | Age: 53
End: 2023-11-07

## 2023-11-07 VITALS — WEIGHT: 227 LBS | BODY MASS INDEX: 30.75 KG/M2 | HEIGHT: 72 IN

## 2023-11-07 DIAGNOSIS — M89.8X7 EXOSTOSIS OF LEFT FOOT: Primary | ICD-10-CM

## 2023-11-07 PROCEDURE — 99024 POSTOP FOLLOW-UP VISIT: CPT | Performed by: PODIATRIST

## 2023-11-07 NOTE — PROGRESS NOTES
Patient is in today for post op of left foot. Patient is not having any issues at this time.  Pcp is Sade Bond DO  Last ov 10/20/23
Exam:  Neurovascular status grossly intact left foot. Surgical site stable with sutures intact left foot. No signs of infection noted surgical site left foot. Minimal edema noted surgical site left foot. Adequate range of motion noted digital regions left foot. Stable gait noted with current offloading shoe intact. Diagnostic Studies:     No results found. Procedures:    None    Plan Per Assessment  Talha Ferguson was seen today for post-op check. Diagnoses and all orders for this visit:    Exostosis of left foot      Postoperative evaluation and management  Surgical site evaluated left foot, dry dressing reapplied. Patient was advised continued use of the offloading shoe over the next week until his next follow-up visit to allow for appropriate and continued healing. Patient is to increase ambulatory activities daily to tolerance. Patient will be followed up at a later date for continued surgical site evaluation and management. Seen By:    Tracee Clifford DPM    Electronically signed by Tracee Clifford DPM on 11/7/2023 at 9:22 AM    This note was created using voice recognition software. The note was reviewed however may contain grammatical errors.

## 2023-11-08 LAB — SURGICAL PATHOLOGY REPORT: NORMAL

## 2023-11-14 ENCOUNTER — OFFICE VISIT (OUTPATIENT)
Dept: PODIATRY | Age: 53
End: 2023-11-14

## 2023-11-14 VITALS — HEIGHT: 72 IN | WEIGHT: 227 LBS | BODY MASS INDEX: 30.75 KG/M2

## 2023-11-14 DIAGNOSIS — M89.8X7 EXOSTOSIS OF LEFT FOOT: Primary | ICD-10-CM

## 2023-11-14 PROCEDURE — 99024 POSTOP FOLLOW-UP VISIT: CPT | Performed by: PODIATRIST

## 2023-11-14 NOTE — PROGRESS NOTES
23     Whit Belling    : 1970   Sex: male    Age: 46 y.o. Patient's PCP/Provider is:  Ciaran Lynn DO    Subjective:  Patient is seen today for follow-up regarding continued care regarding excision exostosis left foot. Overall patient is doing well at this time with minimal issues noted. Patient has been back in his regular shoe gear and increasing activities to tolerance. No other additional abnormalities noted at this time. Patient is very pleased with surgical outcome at this time. Chief Complaint   Patient presents with    Post-Op Check     Left foot        ROS:  Const: Positives and pertinent negatives as per HPI. Musculo: Denies symptoms other than stated above. Neuro: Denies symptoms other than stated above. Skin: Denies symptoms other than stated above. Current Medications:    Current Outpatient Medications:     oxyCODONE-acetaminophen (PERCOCET)  MG per tablet, Take 1 tablet by mouth every 4 hours as needed for Pain for up to 90 days. Max Daily Amount: 6 tablets (Patient taking differently: Take 1 tablet by mouth every 4 hours as needed for Pain. Usually only takes 1/2 tab daily), Disp: 90 tablet, Rfl: 0    alfuzosin (UROXATRAL) 10 MG extended release tablet, Take 1 tablet by mouth daily, Disp: 30 tablet, Rfl: 3    busPIRone (BUSPAR) 30 MG tablet, Take 30 mg by mouth in the morning and at bedtime, Disp: 180 tablet, Rfl: 3    amLODIPine-benazepril (LOTREL) 10-20 MG per capsule, TAKE 1 CAPSULE DAILY, Disp: 90 capsule, Rfl: 3    tadalafil (CIALIS) 20 MG tablet, Take 1 tablet by mouth as needed for Erectile Dysfunction, Disp: 30 tablet, Rfl: 5    valACYclovir (VALTREX) 500 MG tablet, Take 1 tablet by mouth 2 times daily, Disp: 60 tablet, Rfl: 5    EPINEPHrine (EPIPEN 2-EVERTON) 0.3 MG/0.3ML SOAJ injection, Inject 0.3 mLs into the muscle once for 1 dose Use as directed for allergic reaction, Disp: 2 each, Rfl: 5    Allergies:   Allergies   Allergen Reactions    Peanuts

## 2023-12-06 ENCOUNTER — OFFICE VISIT (OUTPATIENT)
Dept: PODIATRY | Age: 53
End: 2023-12-06
Payer: COMMERCIAL

## 2023-12-06 VITALS — BODY MASS INDEX: 30.75 KG/M2 | HEIGHT: 72 IN | WEIGHT: 227 LBS

## 2023-12-06 DIAGNOSIS — M77.52 TENDINITIS OF LEFT FOOT: Primary | ICD-10-CM

## 2023-12-06 DIAGNOSIS — R60.0 LOCALIZED EDEMA: ICD-10-CM

## 2023-12-06 DIAGNOSIS — R26.2 DIFFICULTY WALKING: ICD-10-CM

## 2023-12-06 PROCEDURE — 99213 OFFICE O/P EST LOW 20 MIN: CPT | Performed by: PODIATRIST

## 2023-12-06 PROCEDURE — 29580 STRAPPING UNNA BOOT: CPT | Performed by: PODIATRIST

## 2023-12-06 NOTE — PROGRESS NOTES
Patient is here today for a post op check up on swelling to left foot. He complains of swelling and redness. PCP is Dr. Prosper Stroud, last seen 10/20/2023.

## 2023-12-06 NOTE — PROGRESS NOTES
23     Dimple Kemp    : 1970   Sex: male    Age: 46 y.o. Patient's PCP/Provider is:  Nestor Don DO    Subjective:  Patient is seen today for evaluation regarding residual tendinitis issues left lower extremity. Patient noticed some increased swelling into the lateral left midfoot region. Patient stated he noticed the area over the last 2 to 3 days. Patient been trying to increase his daily activities to tolerance after the procedure performed. Patient denies any recent injury or changes in activities. He denies any nausea, vomiting, fever, chills. No other additional abnormalities noted. Chief Complaint   Patient presents with    Post-Op Check     Left foot post op sx        ROS:  Const: Positives and pertinent negatives as per HPI. Musculo: Denies symptoms other than stated above. Neuro: Denies symptoms other than stated above. Skin: Denies symptoms other than stated above. Current Medications:    Current Outpatient Medications:     oxyCODONE-acetaminophen (PERCOCET)  MG per tablet, Take 1 tablet by mouth every 4 hours as needed for Pain for up to 90 days. Max Daily Amount: 6 tablets (Patient taking differently: Take 1 tablet by mouth every 4 hours as needed for Pain.  Usually only takes 1/2 tab daily), Disp: 90 tablet, Rfl: 0    alfuzosin (UROXATRAL) 10 MG extended release tablet, Take 1 tablet by mouth daily, Disp: 30 tablet, Rfl: 3    busPIRone (BUSPAR) 30 MG tablet, Take 30 mg by mouth in the morning and at bedtime, Disp: 180 tablet, Rfl: 3    amLODIPine-benazepril (LOTREL) 10-20 MG per capsule, TAKE 1 CAPSULE DAILY, Disp: 90 capsule, Rfl: 3    tadalafil (CIALIS) 20 MG tablet, Take 1 tablet by mouth as needed for Erectile Dysfunction, Disp: 30 tablet, Rfl: 5    valACYclovir (VALTREX) 500 MG tablet, Take 1 tablet by mouth 2 times daily, Disp: 60 tablet, Rfl: 5    EPINEPHrine (EPIPEN 2-EVERTON) 0.3 MG/0.3ML SOAJ injection, Inject 0.3 mLs into the muscle once for 1 dose

## 2023-12-11 DIAGNOSIS — M79.672 PAIN IN LEFT FOOT: ICD-10-CM

## 2023-12-11 DIAGNOSIS — R60.0 LOCALIZED EDEMA: Primary | ICD-10-CM

## 2023-12-11 RX ORDER — DOXYCYCLINE HYCLATE 100 MG
100 TABLET ORAL 2 TIMES DAILY
Qty: 28 TABLET | Refills: 0 | Status: SHIPPED | OUTPATIENT
Start: 2023-12-11 | End: 2023-12-25

## 2023-12-13 ENCOUNTER — OFFICE VISIT (OUTPATIENT)
Dept: PODIATRY | Age: 53
End: 2023-12-13
Payer: COMMERCIAL

## 2023-12-13 VITALS — BODY MASS INDEX: 30.75 KG/M2 | HEIGHT: 72 IN | WEIGHT: 227 LBS

## 2023-12-13 DIAGNOSIS — S90.822A BLISTER OF LEFT FOOT, INITIAL ENCOUNTER: Primary | ICD-10-CM

## 2023-12-13 DIAGNOSIS — L03.116 CELLULITIS OF LEFT FOOT: ICD-10-CM

## 2023-12-13 DIAGNOSIS — R26.2 DIFFICULTY WALKING: ICD-10-CM

## 2023-12-13 DIAGNOSIS — F41.9 ANXIETY: ICD-10-CM

## 2023-12-13 PROCEDURE — 99214 OFFICE O/P EST MOD 30 MIN: CPT | Performed by: PODIATRIST

## 2023-12-13 NOTE — TELEPHONE ENCOUNTER
----- Message from Estevan Maynorjenna sent at 12/13/2023  1:29 PM EST -----  Subject: Refill Request    QUESTIONS  Name of Medication? busPIRone (BUSPAR) 30 MG tablet  Patient-reported dosage and instructions? Take 30 mg by mouth in the   morning and at bedtime  How many days do you have left? 0  Preferred Pharmacy? [facility] phone number (if available)? [phone]  Additional Information for Provider? Reference # [id number]  ---------------------------------------------------------------------------  --------------  Maxine Standing INFO  What is the best way for the office to contact you? Do not leave any   message, patient will call back for answer  Preferred Call Back Phone Number? [phone]  ---------------------------------------------------------------------------  --------------  SCRIPT ANSWERS  Relationship to Patient? Covered Entity  Covered Entity Type? Pharmacy? Representative Name?  [facility]

## 2023-12-13 NOTE — PROGRESS NOTES
23     Tra Tracey    : 1970   Sex: male    Age: 46 y.o. Patient's PCP/Provider is:  Vani Mooney DO    Subjective:  Patient is seen today for follow-up regarding recent blistering to the lateral left foot. Patient did call into the office a few days ago, oral doxycycline was called in for the patient until he can make his appointment today. Patient has noticed improvement since taking the oral antibiotic. He has kept the area dressed with a Band-Aid daily. Patient is in no acute distress. He denies any nausea, vomiting, fever, chills. No other additional abnormalities noted at this time. Chief Complaint   Patient presents with    Foot Pain     Left foot        ROS:  Const: Positives and pertinent negatives as per HPI. Musculo: Denies symptoms other than stated above. Neuro: Denies symptoms other than stated above. Skin: Denies symptoms other than stated above. Current Medications:    Current Outpatient Medications:     doxycycline hyclate (VIBRA-TABS) 100 MG tablet, Take 1 tablet by mouth 2 times daily for 14 days, Disp: 28 tablet, Rfl: 0    oxyCODONE-acetaminophen (PERCOCET)  MG per tablet, Take 1 tablet by mouth every 4 hours as needed for Pain for up to 90 days. Max Daily Amount: 6 tablets (Patient taking differently: Take 1 tablet by mouth every 4 hours as needed for Pain.  Usually only takes 1/2 tab daily), Disp: 90 tablet, Rfl: 0    alfuzosin (UROXATRAL) 10 MG extended release tablet, Take 1 tablet by mouth daily, Disp: 30 tablet, Rfl: 3    busPIRone (BUSPAR) 30 MG tablet, Take 30 mg by mouth in the morning and at bedtime, Disp: 180 tablet, Rfl: 3    amLODIPine-benazepril (LOTREL) 10-20 MG per capsule, TAKE 1 CAPSULE DAILY, Disp: 90 capsule, Rfl: 3    tadalafil (CIALIS) 20 MG tablet, Take 1 tablet by mouth as needed for Erectile Dysfunction, Disp: 30 tablet, Rfl: 5    valACYclovir (VALTREX) 500 MG tablet, Take 1 tablet by mouth 2 times daily, Disp: 60 tablet,

## 2023-12-13 NOTE — PROGRESS NOTES
Patient is in today for 1 week left foot pain. Patient says he took the unna boot off and it became more painful with pus and drainage. Patient says since taking the medication, it feels much better.  Pcp is Murphy Dickens,   Last ov 10/20/23

## 2023-12-14 RX ORDER — BUSPIRONE HYDROCHLORIDE 30 MG/1
30 TABLET ORAL 2 TIMES DAILY
Qty: 180 TABLET | Refills: 3 | Status: SHIPPED | OUTPATIENT
Start: 2023-12-14

## 2023-12-14 NOTE — TELEPHONE ENCOUNTER
[facility] called for a refill. I informed that RX is pending.     Last seen 10/20/2023  Next appt 1/15/2024  [facility]

## 2023-12-27 DIAGNOSIS — Z00.00 ANNUAL PHYSICAL EXAM: ICD-10-CM

## 2023-12-27 RX ORDER — AMLODIPINE BESYLATE AND BENAZEPRIL HYDROCHLORIDE 10; 20 MG/1; MG/1
CAPSULE ORAL
Qty: 90 CAPSULE | Refills: 3 | Status: SHIPPED | OUTPATIENT
Start: 2023-12-27

## 2024-01-12 DIAGNOSIS — R53.82 CHRONIC FATIGUE: ICD-10-CM

## 2024-01-12 DIAGNOSIS — R79.89 ABNORMAL LFTS: ICD-10-CM

## 2024-01-12 LAB
ABSOLUTE IMMATURE GRANULOCYTE: 0.04 K/UL (ref 0–0.58)
ALBUMIN SERPL-MCNC: 4.4 G/DL (ref 3.5–5.2)
ALP BLD-CCNC: 49 U/L (ref 40–129)
ALT SERPL-CCNC: 64 U/L (ref 0–40)
ANION GAP SERPL CALCULATED.3IONS-SCNC: 14 MMOL/L (ref 7–16)
AST SERPL-CCNC: 50 U/L (ref 0–39)
BASOPHILS ABSOLUTE: 0.07 K/UL (ref 0–0.2)
BASOPHILS RELATIVE PERCENT: 1 % (ref 0–2)
BILIRUB SERPL-MCNC: 0.6 MG/DL (ref 0–1.2)
BUN BLDV-MCNC: 7 MG/DL (ref 6–20)
CALCIUM SERPL-MCNC: 9.1 MG/DL (ref 8.6–10.2)
CHLORIDE BLD-SCNC: 102 MMOL/L (ref 98–107)
CO2: 22 MMOL/L (ref 22–29)
CREAT SERPL-MCNC: 0.8 MG/DL (ref 0.7–1.2)
EOSINOPHILS ABSOLUTE: 0.21 K/UL (ref 0.05–0.5)
EOSINOPHILS RELATIVE PERCENT: 3 % (ref 0–6)
GFR SERPL CREATININE-BSD FRML MDRD: >60 ML/MIN/1.73M2
GGT, 20027: 110 U/L (ref 10–71)
GLUCOSE BLD-MCNC: 91 MG/DL (ref 74–99)
HCT VFR BLD CALC: 41.4 % (ref 37–54)
HEMOGLOBIN: 13.6 G/DL (ref 12.5–16.5)
IMMATURE GRANULOCYTES: 1 % (ref 0–5)
LYMPHOCYTES ABSOLUTE: 1.71 K/UL (ref 1.5–4)
LYMPHOCYTES RELATIVE PERCENT: 28 % (ref 20–42)
MCH RBC QN AUTO: 32.6 PG (ref 26–35)
MCHC RBC AUTO-ENTMCNC: 32.9 G/DL (ref 32–34.5)
MCV RBC AUTO: 99.3 FL (ref 80–99.9)
MONOCYTES ABSOLUTE: 0.56 K/UL (ref 0.1–0.95)
MONOCYTES RELATIVE PERCENT: 9 % (ref 2–12)
NEUTROPHILS ABSOLUTE: 3.52 K/UL (ref 1.8–7.3)
NEUTROPHILS RELATIVE PERCENT: 58 % (ref 43–80)
PDW BLD-RTO: 13.4 % (ref 11.5–15)
PLATELET # BLD: 325 K/UL (ref 130–450)
PMV BLD AUTO: 9.6 FL (ref 7–12)
POTASSIUM SERPL-SCNC: 4.2 MMOL/L (ref 3.5–5)
RBC # BLD: 4.17 M/UL (ref 3.8–5.8)
SODIUM BLD-SCNC: 138 MMOL/L (ref 132–146)
TOTAL PROTEIN: 7.2 G/DL (ref 6.4–8.3)
WBC # BLD: 6.1 K/UL (ref 4.5–11.5)

## 2024-01-15 ENCOUNTER — OFFICE VISIT (OUTPATIENT)
Dept: FAMILY MEDICINE CLINIC | Age: 54
End: 2024-01-15
Payer: COMMERCIAL

## 2024-01-15 VITALS
DIASTOLIC BLOOD PRESSURE: 80 MMHG | RESPIRATION RATE: 18 BRPM | SYSTOLIC BLOOD PRESSURE: 126 MMHG | WEIGHT: 232 LBS | OXYGEN SATURATION: 98 % | HEART RATE: 83 BPM | BODY MASS INDEX: 31.42 KG/M2 | HEIGHT: 72 IN

## 2024-01-15 DIAGNOSIS — G89.29 CHRONIC RIGHT SHOULDER PAIN: ICD-10-CM

## 2024-01-15 DIAGNOSIS — R79.89 ABNORMAL LFTS: ICD-10-CM

## 2024-01-15 DIAGNOSIS — M25.511 CHRONIC RIGHT SHOULDER PAIN: ICD-10-CM

## 2024-01-15 DIAGNOSIS — S22.000A CLOSED COMPRESSION FRACTURE OF THORACIC VERTEBRA, INITIAL ENCOUNTER (HCC): ICD-10-CM

## 2024-01-15 DIAGNOSIS — R53.82 CHRONIC FATIGUE: ICD-10-CM

## 2024-01-15 DIAGNOSIS — M51.16 LUMBAR DISC DISEASE WITH RADICULOPATHY: ICD-10-CM

## 2024-01-15 DIAGNOSIS — F51.01 PRIMARY INSOMNIA: ICD-10-CM

## 2024-01-15 DIAGNOSIS — E78.5 HYPERLIPIDEMIA, UNSPECIFIED HYPERLIPIDEMIA TYPE: ICD-10-CM

## 2024-01-15 DIAGNOSIS — Z12.11 COLON CANCER SCREENING: ICD-10-CM

## 2024-01-15 DIAGNOSIS — Z12.5 SCREENING FOR MALIGNANT NEOPLASM OF PROSTATE: ICD-10-CM

## 2024-01-15 DIAGNOSIS — L02.619 ABSCESS OF FOOT: Primary | ICD-10-CM

## 2024-01-15 DIAGNOSIS — R73.01 IFG (IMPAIRED FASTING GLUCOSE): ICD-10-CM

## 2024-01-15 DIAGNOSIS — S32.010A CLOSED COMPRESSION FRACTURE OF L1 VERTEBRA, INITIAL ENCOUNTER (HCC): ICD-10-CM

## 2024-01-15 PROCEDURE — 3074F SYST BP LT 130 MM HG: CPT | Performed by: FAMILY MEDICINE

## 2024-01-15 PROCEDURE — 99214 OFFICE O/P EST MOD 30 MIN: CPT | Performed by: FAMILY MEDICINE

## 2024-01-15 PROCEDURE — 3079F DIAST BP 80-89 MM HG: CPT | Performed by: FAMILY MEDICINE

## 2024-01-15 RX ORDER — RAMELTEON 8 MG/1
8 TABLET ORAL NIGHTLY PRN
Qty: 90 TABLET | Refills: 3 | Status: SHIPPED | OUTPATIENT
Start: 2024-01-15 | End: 2025-01-14

## 2024-01-15 RX ORDER — CEPHALEXIN 500 MG/1
500 CAPSULE ORAL 2 TIMES DAILY
Qty: 28 CAPSULE | Refills: 0 | Status: SHIPPED | OUTPATIENT
Start: 2024-01-15 | End: 2024-01-29

## 2024-01-15 RX ORDER — OXYCODONE AND ACETAMINOPHEN 10; 325 MG/1; MG/1
1 TABLET ORAL EVERY 4 HOURS PRN
Qty: 90 TABLET | Refills: 0 | Status: SHIPPED | OUTPATIENT
Start: 2024-01-15 | End: 2024-02-14

## 2024-01-15 ASSESSMENT — PATIENT HEALTH QUESTIONNAIRE - PHQ9
2. FEELING DOWN, DEPRESSED OR HOPELESS: 0
SUM OF ALL RESPONSES TO PHQ QUESTIONS 1-9: 0
1. LITTLE INTEREST OR PLEASURE IN DOING THINGS: 0
SUM OF ALL RESPONSES TO PHQ QUESTIONS 1-9: 0
SUM OF ALL RESPONSES TO PHQ9 QUESTIONS 1 & 2: 0

## 2024-01-15 ASSESSMENT — ENCOUNTER SYMPTOMS
WHEEZING: 0
BLOOD IN STOOL: 0
SORE THROAT: 0
RHINORRHEA: 0
SINUS PRESSURE: 0
PHOTOPHOBIA: 0
VOICE CHANGE: 0
EYE ITCHING: 0
CHOKING: 0
APNEA: 0
SHORTNESS OF BREATH: 0
COUGH: 0
CHEST TIGHTNESS: 0
DIARRHEA: 0
VOMITING: 0
ABDOMINAL PAIN: 0
STRIDOR: 0
EYE PAIN: 0
TROUBLE SWALLOWING: 0
EYE REDNESS: 0
COLOR CHANGE: 0
ABDOMINAL DISTENTION: 0
RECTAL PAIN: 0
NAUSEA: 0
CONSTIPATION: 0
FACIAL SWELLING: 0
EYE DISCHARGE: 0
ANAL BLEEDING: 0

## 2024-01-15 NOTE — PROGRESS NOTES
Erik Hurst is a 53 y.o. male  .  Subjective:      Had foot surgery to left. Had infection to left foot. Formed cystic structure. Has not improved. Chronic right shoulder pain has worsened. Discussed liver enzymes. Have gone up a little again. We will repeat.Discussed back pain. Relatively well controlled. Will recheck liver enzymes again. Discussed pain medication that he is using sparingly.        Review of Systems   Constitutional:  Negative for activity change, appetite change, chills, diaphoresis, fatigue, fever and unexpected weight change.   HENT:  Negative for congestion, dental problem, drooling, ear discharge, ear pain, facial swelling, hearing loss, mouth sores, nosebleeds, postnasal drip, rhinorrhea, sinus pressure, sneezing, sore throat, tinnitus, trouble swallowing and voice change.    Eyes:  Negative for photophobia, pain, discharge, redness, itching and visual disturbance.   Respiratory:  Negative for apnea, cough, choking, chest tightness, shortness of breath, wheezing and stridor.    Cardiovascular:  Negative for chest pain, palpitations and leg swelling.   Gastrointestinal:  Negative for abdominal distention, abdominal pain, anal bleeding, blood in stool, constipation, diarrhea, nausea, rectal pain and vomiting.   Endocrine: Negative for cold intolerance, heat intolerance, polydipsia, polyphagia and polyuria.   Genitourinary:  Negative for decreased urine volume, difficulty urinating, dysuria, enuresis, flank pain, frequency, genital sores, hematuria, penile discharge, penile pain, penile swelling, scrotal swelling, testicular pain and urgency.   Musculoskeletal:  Positive for arthralgias, back pain and gait problem. Negative for joint swelling, myalgias, neck pain and neck stiffness.   Skin:  Negative for color change, pallor, rash and wound.   Allergic/Immunologic: Negative for environmental allergies, food allergies and immunocompromised state.   Neurological:  Negative for dizziness,

## 2024-01-16 ASSESSMENT — ENCOUNTER SYMPTOMS: BACK PAIN: 1

## 2024-01-19 ENCOUNTER — OFFICE VISIT (OUTPATIENT)
Dept: ORTHOPEDIC SURGERY | Age: 54
End: 2024-01-19
Payer: COMMERCIAL

## 2024-01-19 VITALS — BODY MASS INDEX: 31.42 KG/M2 | HEIGHT: 72 IN | WEIGHT: 232 LBS | TEMPERATURE: 98 F

## 2024-01-19 DIAGNOSIS — M75.81 TENDINITIS OF RIGHT ROTATOR CUFF: Primary | ICD-10-CM

## 2024-01-19 DIAGNOSIS — G89.29 CHRONIC RIGHT SHOULDER PAIN: ICD-10-CM

## 2024-01-19 DIAGNOSIS — M25.811 IMPINGEMENT OF RIGHT SHOULDER: ICD-10-CM

## 2024-01-19 DIAGNOSIS — M89.9 HUMERUS LESION, RIGHT: ICD-10-CM

## 2024-01-19 DIAGNOSIS — M25.511 CHRONIC RIGHT SHOULDER PAIN: ICD-10-CM

## 2024-01-19 PROCEDURE — 99204 OFFICE O/P NEW MOD 45 MIN: CPT | Performed by: ORTHOPAEDIC SURGERY

## 2024-01-19 SDOH — HEALTH STABILITY: PHYSICAL HEALTH: ON AVERAGE, HOW MANY DAYS PER WEEK DO YOU ENGAGE IN MODERATE TO STRENUOUS EXERCISE (LIKE A BRISK WALK)?: 2 DAYS

## 2024-01-19 ASSESSMENT — ENCOUNTER SYMPTOMS
SHORTNESS OF BREATH: 0
ABDOMINAL DISTENTION: 0
EYE DISCHARGE: 0
ALLERGIC/IMMUNOLOGIC NEGATIVE: 1

## 2024-01-19 NOTE — PROGRESS NOTES
Erik Hurst (:  1970) is a 53 y.o. male,New patient, here for evaluation of the following chief complaint(s):  Shoulder Pain (Right shoulder pain. Ongoing many years. 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 Pain is constant. Worsens with pressure and movement. )         ASSESSMENT/PLAN:  1. Tendinitis of right rotator cuff  2. Chronic right shoulder pain  3. Impingement of right shoulder  4. Humerus lesion, right    This is a 53 y.o. year old male with Tendinitis of right rotator cuff [M75.81]. Right shoulder impingement and a periosteal lesion of the his right humerus seen on xray.  Denies any constitutional symptoms.  He has failed nonop treatment including NSAIDs and HEP.  I discussed a variety of treatment options with the patient today including observation, NSAID,  low impact exercise, weight loss, physical therapy and injections. I also discussed MRI to further investigate his right shoulder and this humeral lesion.  Discussed I could be seeing the deltoid insertion area but it is certainly a broad area with reaction outside of the bone.  I would recommend an MRI w and wo contrast of the humerus and MRI wo of his right shoulder. The patient would like to proceed with MRI.    Return for after MRI.         Subjective   SUBJECTIVE/OBJECTIVE:  Shoulder Pain     53-year-old male here today to discuss his right shoulder.  This been going on for 5 years and getting worse.  He had an injury where he was attempting to start a lawnmower that would not turn over and since then has had progressive right shoulder pain.  He states pain is a achy pain and he also notes weakness with overhead activities.  He denies any previous trauma to the arm prior to that.  Worsened recently by working on his car. He rates his pain 4 out of 10.  He has been on over-the-counter anti-inflammatories as well as a home exercise program without relief.  He denies any numbness or other associated symptoms.  He denies

## 2024-01-23 ENCOUNTER — TELEPHONE (OUTPATIENT)
Dept: FAMILY MEDICINE CLINIC | Age: 54
End: 2024-01-23

## 2024-01-23 NOTE — TELEPHONE ENCOUNTER
MRI Humerus and Right Shoulder is in peer 2 peer. Clinicals have been fax[facility] to [facility]. Peer to Peer # [phone] - Order number [id number] ( [id number]) He is scheduled here in [place] 2/2/24.  Please advise if peer to peer is completed and approved.  Thank you.

## 2024-01-25 ENCOUNTER — TELEPHONE (OUTPATIENT)
Dept: FAMILY MEDICINE CLINIC | Age: 54
End: 2024-01-25

## 2024-01-25 ENCOUNTER — OFFICE VISIT (OUTPATIENT)
Dept: PODIATRY | Age: 54
End: 2024-01-25
Payer: COMMERCIAL

## 2024-01-25 VITALS — BODY MASS INDEX: 31.42 KG/M2 | HEIGHT: 72 IN | WEIGHT: 232 LBS

## 2024-01-25 DIAGNOSIS — M79.672 PAIN IN LEFT FOOT: ICD-10-CM

## 2024-01-25 DIAGNOSIS — R26.2 DIFFICULTY WALKING: ICD-10-CM

## 2024-01-25 DIAGNOSIS — L90.5 CICATRIX OF SKIN: Primary | ICD-10-CM

## 2024-01-25 LAB — AFP: 5.9 UG/L

## 2024-01-25 PROCEDURE — 99213 OFFICE O/P EST LOW 20 MIN: CPT | Performed by: PODIATRIST

## 2024-01-25 NOTE — TELEPHONE ENCOUNTER
MRI Humerus and Shoulder.  Clinical notes were sent thru [facility] for approval. A Peer to Peer could be done at [phone] - Member number [id number]  and Order number [id number].  He is scheduled on 2/2/24 here in [place].  If peer to peer is done and they are approved please let me know.  Thank you.

## 2024-01-25 NOTE — PROGRESS NOTES
24     Erik Hurst    : 1970   Sex: male    Age: 53 y.o.    Patient's PCP/Provider is:  Chase Khanna DO    Subjective:  Patient is seen today for follow-up regarding care regarding cicatrix issues previous surgical site left midfoot region.  Patient has been having some swelling issues in the area and some mild erythema.  Patient was on multiple antibiotics over the last several weeks with improvement noted.  Patient is in no acute distress.  He denies any nausea, vomiting, fever, chills.  Patient has been trying to wear good supportive shoe gear as recommended to prevent additional issues from occurring.  No other additional abnormalities noted at this time.    Chief Complaint   Patient presents with    Wound Check     Left foot        ROS:  Const: Positives and pertinent negatives as per HPI.    Musculo: Denies symptoms other than stated above.  Neuro: Denies symptoms other than stated above.  Skin: Denies symptoms other than stated above.    Current Medications:    Current Outpatient Medications:     ramelteon (ROZEREM) 8 MG tablet, Take 1 tablet by mouth nightly as needed for Sleep, Disp: 90 tablet, Rfl: 3    oxyCODONE-acetaminophen (PERCOCET)  MG per tablet, Take 1 tablet by mouth every 4 hours as needed for Pain for up to 30 days., Disp: 90 tablet, Rfl: 0    cephALEXin (KEFLEX) 500 MG capsule, Take 1 capsule by mouth 2 times daily for 14 days, Disp: 28 capsule, Rfl: 0    amLODIPine-benazepril (LOTREL) 10-20 MG per capsule, TAKE 1 CAPSULE DAILY, Disp: 90 capsule, Rfl: 3    busPIRone (BUSPAR) 30 MG tablet, Take 30 mg by mouth in the morning and at bedtime, Disp: 180 tablet, Rfl: 3    alfuzosin (UROXATRAL) 10 MG extended release tablet, Take 1 tablet by mouth daily, Disp: 30 tablet, Rfl: 3    tadalafil (CIALIS) 20 MG tablet, Take 1 tablet by mouth as needed for Erectile Dysfunction, Disp: 30 tablet, Rfl: 5    valACYclovir (VALTREX) 500 MG tablet, Take 1 tablet by mouth 2 times daily,

## 2024-01-28 DIAGNOSIS — R39.14 BENIGN PROSTATIC HYPERPLASIA WITH INCOMPLETE BLADDER EMPTYING: ICD-10-CM

## 2024-01-28 DIAGNOSIS — N40.1 BENIGN PROSTATIC HYPERPLASIA WITH INCOMPLETE BLADDER EMPTYING: ICD-10-CM

## 2024-01-29 RX ORDER — ALFUZOSIN HYDROCHLORIDE 10 MG/1
10 TABLET, EXTENDED RELEASE ORAL DAILY
Qty: 30 TABLET | Refills: 3 | Status: SHIPPED | OUTPATIENT
Start: 2024-01-29

## 2024-01-31 DIAGNOSIS — M25.811 IMPINGEMENT OF RIGHT SHOULDER: ICD-10-CM

## 2024-01-31 DIAGNOSIS — M25.511 CHRONIC RIGHT SHOULDER PAIN: Primary | ICD-10-CM

## 2024-01-31 DIAGNOSIS — G89.29 CHRONIC RIGHT SHOULDER PAIN: Primary | ICD-10-CM

## 2024-01-31 DIAGNOSIS — M75.81 TENDINITIS OF RIGHT ROTATOR CUFF: ICD-10-CM

## 2024-02-28 DIAGNOSIS — M89.9 HUMERUS LESION, RIGHT: Primary | ICD-10-CM

## 2024-03-01 ENCOUNTER — OFFICE VISIT (OUTPATIENT)
Dept: ORTHOPEDIC SURGERY | Age: 54
End: 2024-03-01
Payer: COMMERCIAL

## 2024-03-01 VITALS — HEIGHT: 72 IN | BODY MASS INDEX: 31.42 KG/M2 | WEIGHT: 232 LBS

## 2024-03-01 DIAGNOSIS — M25.511 CHRONIC RIGHT SHOULDER PAIN: ICD-10-CM

## 2024-03-01 DIAGNOSIS — M25.811 IMPINGEMENT OF RIGHT SHOULDER: Primary | ICD-10-CM

## 2024-03-01 DIAGNOSIS — G89.29 CHRONIC RIGHT SHOULDER PAIN: ICD-10-CM

## 2024-03-01 DIAGNOSIS — M89.9 HUMERUS LESION, RIGHT: ICD-10-CM

## 2024-03-01 DIAGNOSIS — M75.81 TENDINITIS OF RIGHT ROTATOR CUFF: ICD-10-CM

## 2024-03-01 PROCEDURE — 99214 OFFICE O/P EST MOD 30 MIN: CPT | Performed by: ORTHOPAEDIC SURGERY

## 2024-03-01 ASSESSMENT — ENCOUNTER SYMPTOMS
ALLERGIC/IMMUNOLOGIC NEGATIVE: 1
EYE DISCHARGE: 0
ABDOMINAL DISTENTION: 0
SHORTNESS OF BREATH: 0

## 2024-03-01 NOTE — PROGRESS NOTES
Erik Hurst (:  1970) is a 53 y.o. male,Established patient, here for evaluation of the following chief complaint(s):  Shoulder Pain (Right shoulder pain f/u. Completed 4 weeks of PT. Minimal improvement if any. Right hand dominant. )         ASSESSMENT/PLAN:  1. Impingement of right shoulder  2. Chronic right shoulder pain  3. Humerus lesion, right  4. Tendinitis of right rotator cuff    This is a 53 y.o. year old male with Impingement of right shoulder [M25.811] with humeral lesion at the level of the deltoid insertion.  I discussed a variety of treatment options with the patient today including observation, NSAID, low impact exercise, PT, injections.  Discussed attempting to reorder MRI of his shoulder and humerus (w and wo contrast) to evaluate lesion.  Patient has now failed 4 weeks PT.    Return for MRI review.         Subjective   SUBJECTIVE/OBJECTIVE:  HPI    53-year-old male here today to discuss his right shoulder and right humeral lesion that identified on x-ray at last visit.  The insurance company denied his MRI and recommended that he do 4 weeks of physical therapy.  He returns now 4 weeks after therapy with minimal improvement.  We did obtain new x-rays today which I will discuss below.  He continues to have difficulty with overhead activity.  He denies any previous trauma.  He has been using anti-inflammatories and was previously on a home exercise program without relief.  Denies numbness and tingling and does not have a smoking history.  He notes weakness with overhead activities.    Past Medical History:   Diagnosis Date    Gout     Hyperlipidemia     Hypertension      Past Surgical History:   Procedure Laterality Date    BACK SURGERY      lumbar fusion x2    COLONOSCOPY      FOOT SURGERY Left 2023    Resection Exostosis Left Foot performed by Nikos Ayoub Jr., DPM at [facility] OR    OVAL / ROUND WINDOW FISTULA      x 3      Family History   Problem Relation Age of Onset

## 2024-03-06 ENCOUNTER — OFFICE VISIT (OUTPATIENT)
Dept: PODIATRY | Age: 54
End: 2024-03-06
Payer: COMMERCIAL

## 2024-03-06 VITALS — BODY MASS INDEX: 31.42 KG/M2 | HEIGHT: 72 IN | WEIGHT: 232 LBS

## 2024-03-06 DIAGNOSIS — M79.89 MASS OF SOFT TISSUE OF FOOT: Primary | ICD-10-CM

## 2024-03-06 DIAGNOSIS — M79.672 PAIN IN LEFT FOOT: ICD-10-CM

## 2024-03-06 DIAGNOSIS — R26.2 DIFFICULTY WALKING: ICD-10-CM

## 2024-03-06 PROCEDURE — 99213 OFFICE O/P EST LOW 20 MIN: CPT | Performed by: PODIATRIST

## 2024-03-06 NOTE — PROGRESS NOTES
3/6/24     Erik Hurst    : 1970   Sex: male    Age: 53 y.o.    Patient's PCP/Provider is:  Chase Khanna DO    Subjective:  Patient is seen today for follow-up regarding continued care regarding chronic painful and recurrent soft tissue mass left foot.  Overall patient having issues into the left foot.  No other additional abnormalities noted at this time.  Patient still having issues with certain shoe gear and activities.  No other additional abnormalities noted at this time.    Chief Complaint   Patient presents with    Foot Pain       ROS:  Const: Positives and pertinent negatives as per HPI.    Musculo: Denies symptoms other than stated above.  Neuro: Denies symptoms other than stated above.  Skin: Denies symptoms other than stated above.    Current Medications:    Current Outpatient Medications:     alfuzosin (UROXATRAL) 10 MG extended release tablet, take 1 tablet by mouth once daily, Disp: 30 tablet, Rfl: 3    ramelteon (ROZEREM) 8 MG tablet, Take 1 tablet by mouth nightly as needed for Sleep, Disp: 90 tablet, Rfl: 3    amLODIPine-benazepril (LOTREL) 10-20 MG per capsule, TAKE 1 CAPSULE DAILY, Disp: 90 capsule, Rfl: 3    busPIRone (BUSPAR) 30 MG tablet, Take 30 mg by mouth in the morning and at bedtime, Disp: 180 tablet, Rfl: 3    tadalafil (CIALIS) 20 MG tablet, Take 1 tablet by mouth as needed for Erectile Dysfunction, Disp: 30 tablet, Rfl: 5    valACYclovir (VALTREX) 500 MG tablet, Take 1 tablet by mouth 2 times daily, Disp: 60 tablet, Rfl: 5    EPINEPHrine (EPIPEN 2-EVERTON) 0.3 MG/0.3ML SOAJ injection, Inject 0.3 mLs into the muscle once for 1 dose Use as directed for allergic reaction, Disp: 2 each, Rfl: 5    Allergies:  Allergies   Allergen Reactions    Peanuts [Peanut Oil] Anaphylaxis       Vitals:    24 1302   Weight: 105.2 kg (232 lb)   Height: 1.829 m (6')       Exam:  Neurovascular status unchanged.  Tenderness noted overlying the lateral fifth metatarsal cuboid region left foot.

## 2024-03-06 NOTE — PROGRESS NOTES
Patient here for left foot pain. Chase Khanna,  last OV 1/15/2024.  Electronically signed by Anny Nayak LPN on 3/6/2024 at 1:03 PM

## 2024-03-12 ENCOUNTER — TELEPHONE (OUTPATIENT)
Dept: FAMILY MEDICINE CLINIC | Age: 54
End: 2024-03-12

## 2024-03-15 NOTE — TELEPHONE ENCOUNTER
MATT CROW completed peer to peer. Approved 3/15/24-5/13/24.   Alternate Order #: [id number]  Order #: [id number]

## 2024-04-03 ENCOUNTER — OFFICE VISIT (OUTPATIENT)
Dept: FAMILY MEDICINE CLINIC | Age: 54
End: 2024-04-03
Payer: COMMERCIAL

## 2024-04-03 VITALS
HEIGHT: 72 IN | WEIGHT: 233 LBS | DIASTOLIC BLOOD PRESSURE: 78 MMHG | HEART RATE: 69 BPM | SYSTOLIC BLOOD PRESSURE: 136 MMHG | BODY MASS INDEX: 31.56 KG/M2 | RESPIRATION RATE: 18 BRPM | OXYGEN SATURATION: 96 %

## 2024-04-03 DIAGNOSIS — E53.8 VITAMIN B 12 DEFICIENCY: ICD-10-CM

## 2024-04-03 DIAGNOSIS — E78.2 MIXED HYPERLIPIDEMIA: ICD-10-CM

## 2024-04-03 DIAGNOSIS — R73.01 IFG (IMPAIRED FASTING GLUCOSE): ICD-10-CM

## 2024-04-03 DIAGNOSIS — F51.01 PRIMARY INSOMNIA: ICD-10-CM

## 2024-04-03 DIAGNOSIS — R53.82 CHRONIC FATIGUE: ICD-10-CM

## 2024-04-03 DIAGNOSIS — E78.5 HYPERLIPIDEMIA, UNSPECIFIED HYPERLIPIDEMIA TYPE: ICD-10-CM

## 2024-04-03 DIAGNOSIS — Z12.5 SCREENING FOR MALIGNANT NEOPLASM OF PROSTATE: ICD-10-CM

## 2024-04-03 DIAGNOSIS — R79.89 ABNORMAL LFTS: ICD-10-CM

## 2024-04-03 DIAGNOSIS — M54.50 LUMBAR BACK PAIN: ICD-10-CM

## 2024-04-03 DIAGNOSIS — Z01.818 PRE-OPERATIVE CLEARANCE: Primary | ICD-10-CM

## 2024-04-03 PROCEDURE — 3078F DIAST BP <80 MM HG: CPT | Performed by: FAMILY MEDICINE

## 2024-04-03 PROCEDURE — 3075F SYST BP GE 130 - 139MM HG: CPT | Performed by: FAMILY MEDICINE

## 2024-04-03 PROCEDURE — 99214 OFFICE O/P EST MOD 30 MIN: CPT | Performed by: FAMILY MEDICINE

## 2024-04-03 RX ORDER — INDOMETHACIN 50 MG/1
50 CAPSULE ORAL
Qty: 90 CAPSULE | Refills: 3 | Status: SHIPPED | OUTPATIENT
Start: 2024-04-03

## 2024-04-03 RX ORDER — TRAZODONE HYDROCHLORIDE 50 MG/1
TABLET ORAL
Qty: 60 TABLET | Refills: 3 | Status: SHIPPED | OUTPATIENT
Start: 2024-04-03

## 2024-04-03 NOTE — PROGRESS NOTES
(Medical): Not on file     Lack of Transportation (Non-Medical): Patient declined   Physical Activity: Unknown (1/19/2024)    Exercise Vital Sign     Days of Exercise per Week: 2 days     Minutes of Exercise per Session: Not on file   Stress: Not on file   Social Connections: Not on file   Intimate Partner Violence: Not on file   Housing Stability: Unknown (6/26/2023)    Housing Stability Vital Sign     Unable to Pay for Housing in the Last Year: Not on file     Number of Places Lived in the Last Year: Not on file     Unstable Housing in the Last Year: Patient refused       Family History   Problem Relation Age of Onset    Cancer Father         leukomia       Current Outpatient Medications on File Prior to Visit   Medication Sig Dispense Refill    alfuzosin (UROXATRAL) 10 MG extended release tablet take 1 tablet by mouth once daily 30 tablet 3    ramelteon (ROZEREM) 8 MG tablet Take 1 tablet by mouth nightly as needed for Sleep 90 tablet 3    amLODIPine-benazepril (LOTREL) 10-20 MG per capsule TAKE 1 CAPSULE DAILY 90 capsule 3    busPIRone (BUSPAR) 30 MG tablet Take 30 mg by mouth in the morning and at bedtime 180 tablet 3    tadalafil (CIALIS) 20 MG tablet Take 1 tablet by mouth as needed for Erectile Dysfunction 30 tablet 5    valACYclovir (VALTREX) 500 MG tablet Take 1 tablet by mouth 2 times daily 60 tablet 5    EPINEPHrine (EPIPEN 2-EVERTON) 0.3 MG/0.3ML SOAJ injection Inject 0.3 mLs into the muscle once for 1 dose Use as directed for allergic reaction 2 each 5     No current facility-administered medications on file prior to visit.       Allergies   Allergen Reactions    Peanuts [Peanut Oil] Anaphylaxis       I have reviewed his allergies, medications, problem list, medical,social and family history and have updated as needed in the electronic medical record.    Objective:     Physical Exam  Vitals and nursing note reviewed.   Constitutional:       General: He is not in acute distress.     Appearance: He is

## 2024-04-04 ENCOUNTER — TELEPHONE (OUTPATIENT)
Dept: PODIATRY | Age: 54
End: 2024-04-04

## 2024-04-04 ENCOUNTER — PREP FOR PROCEDURE (OUTPATIENT)
Dept: PODIATRY | Age: 54
End: 2024-04-04

## 2024-04-04 DIAGNOSIS — M79.89 MASS OF SOFT TISSUE OF LEFT LOWER EXTREMITY: ICD-10-CM

## 2024-04-04 ASSESSMENT — ENCOUNTER SYMPTOMS
PHOTOPHOBIA: 0
CHOKING: 0
APNEA: 0
VOMITING: 0
BLOOD IN STOOL: 0
ABDOMINAL PAIN: 0
EYE ITCHING: 0
BACK PAIN: 1
SHORTNESS OF BREATH: 0
COLOR CHANGE: 0
VOICE CHANGE: 0
SINUS PRESSURE: 0
WHEEZING: 0
EYE DISCHARGE: 0
ABDOMINAL DISTENTION: 0
EYE REDNESS: 0
NAUSEA: 0
RECTAL PAIN: 0
CHEST TIGHTNESS: 0
RHINORRHEA: 0
EYE PAIN: 0
SORE THROAT: 0
FACIAL SWELLING: 0
DIARRHEA: 0
CONSTIPATION: 0
COUGH: 0
ANAL BLEEDING: 0

## 2024-04-04 NOTE — TELEPHONE ENCOUNTER
Prior Authorization Form:      DEMOGRAPHICS:                     Patient Name:  Sharona Cordon  Patient :  1970            Insurance:  Payor: BCBS / Plan: [place] PPO / Product Type: *No Product type* /   Insurance ID Number:    Payer/Plan Subscr  Sex Relation Sub. Ins. ID Effective Group Num   1. BCBS - BCBS -* SHARONA CORDON 1970 Male Self [id number] 17 [id number]                                   [address]         DIAGNOSIS & PROCEDURE:                       Procedure/Operation: Excision Soft Tissue Mass/Deep Left Foot           CPT Code: 15725    Diagnosis:  soft tissue mass left foot, pain in left foot    ICD10 Code: M79.89, M79.672    Location:  [facility]    Surgeon:  Dr. Nikos Ayoub    SCHEDULING INFORMATION:                          Date: 24    Time: TBD              Anesthesia:  MAC/TIVA                                                       Status:  Outpatient        Special Comments:  None.        Electronically signed by Julia Crockett LPN on 2024 at 9:43 AM     [General Appearance - Well Developed] : well developed [Normal Appearance] : normal appearance [Well Groomed] : well groomed [General Appearance - Well Nourished] : well nourished [No Deformities] : no deformities [General Appearance - In No Acute Distress] : no acute distress [Normal Conjunctiva] : the conjunctiva exhibited no abnormalities [Eyelids - No Xanthelasma] : the eyelids demonstrated no xanthelasmas [Normal Oral Mucosa] : normal oral mucosa [No Oral Pallor] : no oral pallor [No Oral Cyanosis] : no oral cyanosis [Normal Jugular Venous A Waves Present] : normal jugular venous A waves present [Normal Jugular Venous V Waves Present] : normal jugular venous V waves present [No Jugular Venous Collins A Waves] : no jugular venous collins A waves [Heart Rate And Rhythm] : heart rate and rhythm were normal [Heart Sounds] : normal S1 and S2 [Murmurs] : no murmurs present [Respiration, Rhythm And Depth] : normal respiratory rhythm and effort [Exaggerated Use Of Accessory Muscles For Inspiration] : no accessory muscle use [Auscultation Breath Sounds / Voice Sounds] : lungs were clear to auscultation bilaterally [Abdomen Soft] : soft [Abdomen Tenderness] : non-tender [Abdomen Mass (___ Cm)] : no abdominal mass palpated [Abnormal Walk] : normal gait [Gait - Sufficient For Exercise Testing] : the gait was sufficient for exercise testing [Nail Clubbing] : no clubbing of the fingernails [Cyanosis, Localized] : no localized cyanosis [Petechial Hemorrhages (___cm)] : no petechial hemorrhages [Skin Color & Pigmentation] : normal skin color and pigmentation [] : no rash [No Venous Stasis] : no venous stasis [Skin Lesions] : no skin lesions [No Skin Ulcers] : no skin ulcer [No Xanthoma] : no  xanthoma was observed [Affect] : the affect was normal [Oriented To Time, Place, And Person] : oriented to person, place, and time [No Anxiety] : not feeling anxious [Mood] : the mood was normal

## 2024-04-09 LAB — NONINV COLON CA DNA+OCC BLD SCRN STL QL: NEGATIVE

## 2024-04-11 RX ORDER — OXYCODONE HYDROCHLORIDE AND ACETAMINOPHEN 5; 325 MG/1; MG/1
1 TABLET ORAL DAILY PRN
COMMUNITY

## 2024-04-17 ENCOUNTER — ANESTHESIA EVENT (OUTPATIENT)
Dept: OPERATING ROOM | Age: 54
End: 2024-04-17
Payer: COMMERCIAL

## 2024-04-17 RX ORDER — SODIUM CHLORIDE 0.9 % (FLUSH) 0.9 %
5-40 SYRINGE (ML) INJECTION EVERY 12 HOURS SCHEDULED
Status: CANCELLED | OUTPATIENT
Start: 2024-04-17

## 2024-04-17 RX ORDER — SODIUM CHLORIDE 9 MG/ML
INJECTION, SOLUTION INTRAVENOUS PRN
Status: CANCELLED | OUTPATIENT
Start: 2024-04-17

## 2024-04-17 RX ORDER — SODIUM CHLORIDE 0.9 % (FLUSH) 0.9 %
5-40 SYRINGE (ML) INJECTION PRN
Status: CANCELLED | OUTPATIENT
Start: 2024-04-17

## 2024-04-17 NOTE — ANESTHESIA PRE PROCEDURE
SURGERY      lumbar fusion x2    COLONOSCOPY      FOOT SURGERY Left 2023    Resection Exostosis Left Foot performed by Nikos Ayoub Jr. DPM at [facility] OR    OVAL / ROUND WINDOW FISTULA      x 3- anal       Social History:    Social History     Tobacco Use    Smoking status: Former     Types: Pipe, Cigarettes     Quit date: 2023     Years since quittin.2    Smokeless tobacco: Never    Tobacco comments:     pipe occassionally   Substance Use Topics    Alcohol use: Yes     Comment: 1 case beer weekly                                Counseling given: Not Answered  Tobacco comments: pipe occassionally      Vital Signs (Current):   Vitals:    24 1804   Weight: 104.3 kg (230 lb)   Height: 1.829 m (6')                                              BP Readings from Last 3 Encounters:   24 136/78   01/15/24 126/80   23 119/70       NPO Status:                                                                                 BMI:   Wt Readings from Last 3 Encounters:   24 105.7 kg (233 lb)   24 105.2 kg (232 lb)   24 105.2 kg (232 lb)     Body mass index is 31.19 kg/m².    CBC:   Lab Results   Component Value Date/Time    WBC 6.1 2024 12:15 PM    RBC 4.17 2024 12:15 PM    HGB 13.6 2024 12:15 PM    HCT 41.4 2024 12:15 PM    MCV 99.3 2024 12:15 PM    RDW 13.4 2024 12:15 PM     2024 12:15 PM       CMP:   Lab Results   Component Value Date/Time     2024 12:15 PM    K 4.2 2024 12:15 PM     2024 12:15 PM    CO2 22 2024 12:15 PM    BUN 7 2024 12:15 PM    CREATININE 0.8 2024 12:15 PM    GFRAA >60 2022 11:39 AM    LABGLOM >60 2024 12:15 PM    GLUCOSE 91 2024 12:15 PM    GLUCOSE 85 2023 06:16 AM    PROT 7.2 2024 12:15 PM    CALCIUM 9.1 2024 12:15 PM    BILITOT 0.6 2024 12:15 PM    ALKPHOS 49 2024 12:15 PM    AST 50 2024 12:15 PM

## 2024-04-18 ENCOUNTER — ANESTHESIA (OUTPATIENT)
Dept: OPERATING ROOM | Age: 54
End: 2024-04-18
Payer: COMMERCIAL

## 2024-04-18 ENCOUNTER — HOSPITAL ENCOUNTER (OUTPATIENT)
Age: 54
Setting detail: OUTPATIENT SURGERY
Discharge: HOME | End: 2024-04-18
Attending: PODIATRIST | Admitting: PODIATRIST
Payer: COMMERCIAL

## 2024-04-18 VITALS
HEART RATE: 81 BPM | OXYGEN SATURATION: 95 % | BODY MASS INDEX: 31.15 KG/M2 | SYSTOLIC BLOOD PRESSURE: 112 MMHG | WEIGHT: 230 LBS | HEIGHT: 72 IN | RESPIRATION RATE: 16 BRPM | DIASTOLIC BLOOD PRESSURE: 72 MMHG | TEMPERATURE: 97.6 F

## 2024-04-18 DIAGNOSIS — M79.89 MASS OF SOFT TISSUE OF LEFT LOWER EXTREMITY: ICD-10-CM

## 2024-04-18 DIAGNOSIS — M54.9 BACK PAIN WITH HISTORY OF SPINAL SURGERY: ICD-10-CM

## 2024-04-18 DIAGNOSIS — S32.010A CLOSED COMPRESSION FRACTURE OF L1 VERTEBRA, INITIAL ENCOUNTER (HCC): ICD-10-CM

## 2024-04-18 DIAGNOSIS — M79.672 PAIN IN LEFT FOOT: ICD-10-CM

## 2024-04-18 DIAGNOSIS — S22.000A CLOSED COMPRESSION FRACTURE OF THORACIC VERTEBRA, INITIAL ENCOUNTER (HCC): ICD-10-CM

## 2024-04-18 DIAGNOSIS — M51.16 LUMBAR DISC DISEASE WITH RADICULOPATHY: ICD-10-CM

## 2024-04-18 DIAGNOSIS — Z98.890 BACK PAIN WITH HISTORY OF SPINAL SURGERY: ICD-10-CM

## 2024-04-18 PROCEDURE — 3600000002 HC SURGERY LEVEL 2 BASE: Performed by: PODIATRIST

## 2024-04-18 PROCEDURE — 2500000003 HC RX 250 WO HCPCS: Performed by: NURSE ANESTHETIST, CERTIFIED REGISTERED

## 2024-04-18 PROCEDURE — 3700000001 HC ADD 15 MINUTES (ANESTHESIA): Performed by: PODIATRIST

## 2024-04-18 PROCEDURE — 7100000010 HC PHASE II RECOVERY - FIRST 15 MIN: Performed by: PODIATRIST

## 2024-04-18 PROCEDURE — 6360000002 HC RX W HCPCS: Performed by: PODIATRIST

## 2024-04-18 PROCEDURE — 2580000003 HC RX 258: Performed by: ANESTHESIOLOGY

## 2024-04-18 PROCEDURE — 28045 EXC FOOT/TOE TUM DEEP <1.5CM: CPT | Performed by: PODIATRIST

## 2024-04-18 PROCEDURE — 2580000003 HC RX 258: Performed by: PODIATRIST

## 2024-04-18 PROCEDURE — 6360000002 HC RX W HCPCS: Performed by: NURSE ANESTHETIST, CERTIFIED REGISTERED

## 2024-04-18 PROCEDURE — 3700000000 HC ANESTHESIA ATTENDED CARE: Performed by: PODIATRIST

## 2024-04-18 PROCEDURE — 88305 TISSUE EXAM BY PATHOLOGIST: CPT

## 2024-04-18 PROCEDURE — 2709999900 HC NON-CHARGEABLE SUPPLY: Performed by: PODIATRIST

## 2024-04-18 PROCEDURE — 7100000011 HC PHASE II RECOVERY - ADDTL 15 MIN: Performed by: PODIATRIST

## 2024-04-18 PROCEDURE — 3600000012 HC SURGERY LEVEL 2 ADDTL 15MIN: Performed by: PODIATRIST

## 2024-04-18 RX ORDER — ONDANSETRON 2 MG/ML
INJECTION INTRAMUSCULAR; INTRAVENOUS PRN
Status: DISCONTINUED | OUTPATIENT
Start: 2024-04-18 | End: 2024-04-18 | Stop reason: SDUPTHER

## 2024-04-18 RX ORDER — OXYCODONE AND ACETAMINOPHEN 10; 325 MG/1; MG/1
1 TABLET ORAL 2 TIMES DAILY
Qty: 28 TABLET | Refills: 0 | Status: SHIPPED | OUTPATIENT
Start: 2024-04-18 | End: 2024-05-02

## 2024-04-18 RX ORDER — SODIUM CHLORIDE 0.9 % (FLUSH) 0.9 %
5-40 SYRINGE (ML) INJECTION EVERY 12 HOURS SCHEDULED
Status: DISCONTINUED | OUTPATIENT
Start: 2024-04-18 | End: 2024-04-18 | Stop reason: HOSPADM

## 2024-04-18 RX ORDER — KETAMINE HCL IN NACL, ISO-OSM 100MG/10ML
SYRINGE (ML) INJECTION PRN
Status: DISCONTINUED | OUTPATIENT
Start: 2024-04-18 | End: 2024-04-18 | Stop reason: SDUPTHER

## 2024-04-18 RX ORDER — KETOROLAC TROMETHAMINE 30 MG/ML
INJECTION, SOLUTION INTRAMUSCULAR; INTRAVENOUS PRN
Status: DISCONTINUED | OUTPATIENT
Start: 2024-04-18 | End: 2024-04-18 | Stop reason: SDUPTHER

## 2024-04-18 RX ORDER — OXYCODONE HYDROCHLORIDE 5 MG/1
5 TABLET ORAL
Status: DISCONTINUED | OUTPATIENT
Start: 2024-04-18 | End: 2024-04-18 | Stop reason: HOSPADM

## 2024-04-18 RX ORDER — SODIUM CHLORIDE 0.9 % (FLUSH) 0.9 %
5-40 SYRINGE (ML) INJECTION PRN
Status: DISCONTINUED | OUTPATIENT
Start: 2024-04-18 | End: 2024-04-18 | Stop reason: HOSPADM

## 2024-04-18 RX ORDER — ONDANSETRON 2 MG/ML
4 INJECTION INTRAMUSCULAR; INTRAVENOUS
Status: DISCONTINUED | OUTPATIENT
Start: 2024-04-18 | End: 2024-04-18 | Stop reason: HOSPADM

## 2024-04-18 RX ORDER — HYDROMORPHONE HYDROCHLORIDE 1 MG/ML
0.5 INJECTION, SOLUTION INTRAMUSCULAR; INTRAVENOUS; SUBCUTANEOUS EVERY 5 MIN PRN
Status: DISCONTINUED | OUTPATIENT
Start: 2024-04-18 | End: 2024-04-18 | Stop reason: HOSPADM

## 2024-04-18 RX ORDER — NALOXONE HYDROCHLORIDE 0.4 MG/ML
INJECTION, SOLUTION INTRAMUSCULAR; INTRAVENOUS; SUBCUTANEOUS PRN
Status: DISCONTINUED | OUTPATIENT
Start: 2024-04-18 | End: 2024-04-18 | Stop reason: HOSPADM

## 2024-04-18 RX ORDER — DEXMEDETOMIDINE HYDROCHLORIDE 100 UG/ML
INJECTION, SOLUTION INTRAVENOUS PRN
Status: DISCONTINUED | OUTPATIENT
Start: 2024-04-18 | End: 2024-04-18 | Stop reason: SDUPTHER

## 2024-04-18 RX ORDER — FENTANYL CITRATE 0.05 MG/ML
25 INJECTION, SOLUTION INTRAMUSCULAR; INTRAVENOUS EVERY 5 MIN PRN
Status: DISCONTINUED | OUTPATIENT
Start: 2024-04-18 | End: 2024-04-18 | Stop reason: HOSPADM

## 2024-04-18 RX ORDER — PROPOFOL 10 MG/ML
INJECTION, EMULSION INTRAVENOUS PRN
Status: DISCONTINUED | OUTPATIENT
Start: 2024-04-18 | End: 2024-04-18 | Stop reason: SDUPTHER

## 2024-04-18 RX ORDER — SODIUM CHLORIDE, SODIUM LACTATE, POTASSIUM CHLORIDE, CALCIUM CHLORIDE 600; 310; 30; 20 MG/100ML; MG/100ML; MG/100ML; MG/100ML
INJECTION, SOLUTION INTRAVENOUS CONTINUOUS
Status: DISCONTINUED | OUTPATIENT
Start: 2024-04-18 | End: 2024-04-18 | Stop reason: HOSPADM

## 2024-04-18 RX ORDER — SODIUM CHLORIDE 9 MG/ML
INJECTION, SOLUTION INTRAVENOUS PRN
Status: DISCONTINUED | OUTPATIENT
Start: 2024-04-18 | End: 2024-04-18 | Stop reason: HOSPADM

## 2024-04-18 RX ORDER — LIDOCAINE HYDROCHLORIDE 20 MG/ML
INJECTION, SOLUTION INTRAVENOUS PRN
Status: DISCONTINUED | OUTPATIENT
Start: 2024-04-18 | End: 2024-04-18 | Stop reason: SDUPTHER

## 2024-04-18 RX ORDER — BUPIVACAINE HYDROCHLORIDE 5 MG/ML
INJECTION, SOLUTION PERINEURAL PRN
Status: DISCONTINUED | OUTPATIENT
Start: 2024-04-18 | End: 2024-04-18 | Stop reason: ALTCHOICE

## 2024-04-18 RX ORDER — HYDROCODONE BITARTRATE AND ACETAMINOPHEN 5; 325 MG/1; MG/1
1 TABLET ORAL EVERY 6 HOURS PRN
Qty: 20 TABLET | Refills: 0 | Status: SHIPPED | OUTPATIENT
Start: 2024-04-18 | End: 2024-04-23

## 2024-04-18 RX ORDER — MIDAZOLAM HYDROCHLORIDE 1 MG/ML
INJECTION INTRAMUSCULAR; INTRAVENOUS PRN
Status: DISCONTINUED | OUTPATIENT
Start: 2024-04-18 | End: 2024-04-18 | Stop reason: SDUPTHER

## 2024-04-18 RX ORDER — FENTANYL CITRATE 50 UG/ML
INJECTION, SOLUTION INTRAMUSCULAR; INTRAVENOUS PRN
Status: DISCONTINUED | OUTPATIENT
Start: 2024-04-18 | End: 2024-04-18 | Stop reason: SDUPTHER

## 2024-04-18 RX ADMIN — PROPOFOL 50 MG: 10 INJECTION, EMULSION INTRAVENOUS at 13:46

## 2024-04-18 RX ADMIN — MIDAZOLAM 2 MG: 1 INJECTION INTRAMUSCULAR; INTRAVENOUS at 13:26

## 2024-04-18 RX ADMIN — ONDANSETRON 4 MG: 2 INJECTION INTRAMUSCULAR; INTRAVENOUS at 13:32

## 2024-04-18 RX ADMIN — Medication 30 MG: at 13:32

## 2024-04-18 RX ADMIN — PROPOFOL 150 MCG/KG/MIN: 10 INJECTION, EMULSION INTRAVENOUS at 13:33

## 2024-04-18 RX ADMIN — FENTANYL CITRATE 50 MCG: 50 INJECTION, SOLUTION INTRAMUSCULAR; INTRAVENOUS at 13:46

## 2024-04-18 RX ADMIN — SODIUM CHLORIDE, POTASSIUM CHLORIDE, SODIUM LACTATE AND CALCIUM CHLORIDE: 600; 310; 30; 20 INJECTION, SOLUTION INTRAVENOUS at 12:05

## 2024-04-18 RX ADMIN — CEFAZOLIN 2000 MG: 2 INJECTION, POWDER, FOR SOLUTION INTRAMUSCULAR; INTRAVENOUS at 13:33

## 2024-04-18 RX ADMIN — DEXMEDETOMIDINE HYDROCHLORIDE 6 MCG: 100 INJECTION, SOLUTION INTRAVENOUS at 13:41

## 2024-04-18 RX ADMIN — FENTANYL CITRATE 50 MCG: 50 INJECTION, SOLUTION INTRAMUSCULAR; INTRAVENOUS at 13:32

## 2024-04-18 RX ADMIN — LIDOCAINE HYDROCHLORIDE 60 MG: 20 INJECTION, SOLUTION INTRAVENOUS at 13:32

## 2024-04-18 RX ADMIN — KETOROLAC TROMETHAMINE 30 MG: 30 INJECTION, SOLUTION INTRAMUSCULAR at 13:51

## 2024-04-18 RX ADMIN — PROPOFOL 50 MG: 10 INJECTION, EMULSION INTRAVENOUS at 13:32

## 2024-04-18 RX ADMIN — DEXMEDETOMIDINE HYDROCHLORIDE 6 MCG: 100 INJECTION, SOLUTION INTRAVENOUS at 13:46

## 2024-04-18 ASSESSMENT — PAIN - FUNCTIONAL ASSESSMENT
PAIN_FUNCTIONAL_ASSESSMENT: 0-10

## 2024-04-18 NOTE — DISCHARGE INSTRUCTIONS
DR. COLBY'S AMBULATORY PROCEDURE DISCHARGE INSTRUCTIONS  You may be drowsy or lightheaded after receiving sedation or anesthesia.    A responsible person should be with you for the next 24 hours.    Please follow the instructions checked below:    DIET INSTRUCTIONS:  [x]Start with light diet and progress to your normal diet as you feel like eating. If you experience nausea or repeated episodes of vomiting which persist beyond 12-24 hours, notify your doctor.  []Other     ACTIVITY INSTRUCTIONS:  [x]Rest today. Increase activity as tolerated    [x]Elevate operative limb   []Sling to operative limb  []No heavy lifting or strenuous activity     []No driving for ***   []Use crutches  []Use walker   [x]Weight bearing: []none /  [x]partial / []full as tolerated   []Other ***    WOUND/DRESSING INSTRUCTIONS:  Always ensure you and your care giver clean hands before and after caring for the wound.  []May shower      []May bathe      [x]Keep dressing dry            [x]Do not remove dressing   []Remove dressing on     []Leave steri strips in place    []Drain care      [x] Ice to operative site for 15-30 minutes of each hour while awake for 24-36 hours  []Use ice cooling system as instructed                  [x]Post-op Shoe-wear when up and about                     []Other ***     MEDICATION INSTRUCTIONS:    [x]Prescriptions sent with you.  Use as directed.  When taking pain medications, you may experience dizziness or drowsiness.  Do not drink alcohol or drive when taking these medications.  [x]You may take a non-prescription anti-inflammatory medication as needed for pain relief.  [x]Give the list of your medications to your primary care physician on your next visit. Keep your med list updated and carry it with in case of emergency  Other Instructions:                  FOLLOW-UP CARE:  [x]Call the office at [phone] with any questions or concerns prior to your scheduled postoperative appointment.  Watch for these  to your doctor about stop-smoking programs and medicines. These can increase your chances of quitting for good.  When should you call for help?   Call 911  anytime you think you may need emergency care. For example, call if:    You passed out (lost consciousness).   Call your doctor now or seek immediate medical care if:    You have new or worse nausea or vomiting.     You are too sick to your stomach to drink any fluids.     You cannot keep down fluids.     You have symptoms of dehydration, such as:  Dry eyes and a dry mouth.  Passing only a little urine.  Feeling thirstier than usual.     Your pain medicine is not helping.     You are dizzy or lightheaded, or you feel like you may faint.   Watch closely for changes in your health, and be sure to contact your doctor if:    You do not get better as expected.   Current as of: October 19, 2023               Content Version: 14.0  © 2006-2024 [employer].   Care instructions adapted under license by [employer]. If you have questions about a medical condition or this instruction, always ask your healthcare professional. [employer] disclaims any warranty or liability for your use of this information.

## 2024-04-18 NOTE — OP NOTE
Operative Note      Patient: Erik Hurst  YOB: 1970  MRN: [id number]    Date of Procedure: 4/18/2024    Pre-Op Diagnosis Codes:     * Mass of soft tissue of left lower extremity [M79.89]     * Pain in left foot [M79.672]    Post-Op Diagnosis: Same       Procedure(s):  Excision Soft Tissue Mass/Deep Left Foot- 30 mins    Surgeon(s):  Nikos Ayoub Jr., DPM    Assistant:   * No surgical staff found *    Anesthesia: Monitor Anesthesia Care    Estimated Blood Loss (mL): Minimal    Complications: None    Specimens:   ID Type Source Tests Collected by Time Destination   A : SOFT TISSUE MASS LEFT FOOT Tissue Tissue SURGICAL PATHOLOGY Nikos Ayoub Jr., DPM 4/18/2024 1342        Implants:  * No implants in log *      Drains: * No LDAs found *    Findings:  Infection Present At Time Of Surgery (PATOS) (choose all levels that have infection present):  No infection present  Other Findings: Chronic, painful soft tissue mass lateral left fifth metatarsal cuboid region  This procedure was not performed to treat primary cutaneous melanoma through wide local excision    Detailed Description of Procedure:   After proper preoperative evaluation, patient was brought back to the operating room placed operating table in the supine position.  Monitored anesthesia was administered per anesthesia department.  Patient did receive 2 g intravenous Ancef preoperatively.  We did utilize a total of 10 cc of 0.5% Marcaine plain to anesthetize surgical site left foot.  Tourniquet was placed around the patient's well-padded left ankle inflated to 2 and 50 m mercury then lowered to the surgical field once proper prepping and draping was performed.  Attention was directed overlying the base of the left fifth metatarsal cuboid region where at this time approximately a 3 cm linear incision was made.  Incision was carried down through subcutaneous tissue layers with all vital structures being bovied and retracted as  necessary.  At this time associated with the deep fascial tissue overlying the fifth metatarsal base region there was mass with adipose tissue intertwined possible retained deep suture.  The area was surgically excised from the area with sharp dissection.  Specimen was sent to pathology for gross examination.  No further soft tissue mass noted underlying.  No additional soft tissue masses noted in the region.  Surgical site was flushed with copious amounts of saline.  Cauterization of the superficial bleeders was performed to allow for proper post procedure hemostasis and surgical site healing.  Epidermal closure was then performed with 4-0 nylon in an interrupted fashion.  Betadine soaked Adaptic and dry padded dressing was then applied left lower extremity.  Tourniquet was released with total tourniquet time of 10 minutes noted, good vascularity was reestablished to the left foot and digital regions.  The patient tolerated the procedure and anesthesia well and left the operating room in stable condition.  The patient is being transported to the recovery room for post operative management.  Patient and/or caregiver was given postoperative home-going instructions and prescriptions to be taken as directed.  Patient and/or caregiver were advised to call the office with any questions or concerns prior to their scheduled postoperative appointment.      Electronically signed by Nikos Ayoub Jr, DPM on 4/18/2024 at 2:53 PM

## 2024-04-18 NOTE — H&P
Update History & Physical     The patient's History and Physical this morning was reviewed with the patient and there were no significant changes.     I examined the patient and there were no significant changes from the previous History and Physical.     Plan: The risk, benefits, expected outcome, and alternative to the recommended procedure have been discussed with the patient.  Patient understands and wants to proceed with the procedure. The procedure discussed is 1. Excision soft tissue mass/deep left foot        Electronically signed by Nikos Ayoub Jr, DPM on 4/18/2024 at 1:23 PM

## 2024-04-18 NOTE — ANESTHESIA POSTPROCEDURE EVALUATION
Department of Anesthesiology  Postprocedure Note    Patient: Erik Hurst  MRN: [id number]  YOB: 1970  Date of evaluation: 4/18/2024    Procedure Summary       Date: 04/18/24 Room / Location: [address]    Anesthesia Start: 1326 Anesthesia Stop: 1359    Procedure: Excision Soft Tissue Mass/Deep Left Foot- 30 mins (Left) Diagnosis:       Mass of soft tissue of left lower extremity      Pain in left foot      (Mass of soft tissue of left lower extremity [M79.89])      (Pain in left foot [M79.672])    Surgeons: Nikos Ayoub Jr., DPM Responsible Provider: Erik Barroso DO    Anesthesia Type: MAC ASA Status: 3            Anesthesia Type: MAC    Mulu Phase I: Mulu Score: 10    Mulu Phase II: Mulu Score: 10    Anesthesia Post Evaluation    Patient location during evaluation: PACU  Patient participation: complete - patient participated  Level of consciousness: awake and alert  Pain score: 1  Airway patency: patent  Nausea & Vomiting: no nausea and no vomiting  Cardiovascular status: hemodynamically stable  Respiratory status: acceptable  Hydration status: euvolemic  Pain management: adequate    No notable events documented.

## 2024-04-23 ENCOUNTER — OFFICE VISIT (OUTPATIENT)
Dept: PODIATRY | Age: 54
End: 2024-04-23

## 2024-04-23 VITALS — BODY MASS INDEX: 31.15 KG/M2 | HEIGHT: 72 IN | WEIGHT: 230 LBS

## 2024-04-23 DIAGNOSIS — M79.89 MASS OF SOFT TISSUE OF LEFT LOWER EXTREMITY: Primary | ICD-10-CM

## 2024-04-23 PROCEDURE — 99024 POSTOP FOLLOW-UP VISIT: CPT | Performed by: PODIATRIST

## 2024-04-23 NOTE — PROGRESS NOTES
24     Erik Hurst    : 1970   Sex: male    Age: 53 y.o.    Patient's PCP/Provider is:  Chase Khanna DO    Subjective:  Patient is seen today for follow-up regarding continued evaluation excision soft tissue mass left foot.  Overall patient is doing well at this time with minimal issues noted.  Patient is in no acute distress.  He denies any nausea, vomiting, fever, chills.  No other additional abnormalities noted at this time.    Chief Complaint   Patient presents with    Post-Op Check     Left foot        ROS:  Const: Positives and pertinent negatives as per HPI.    Musculo: Denies symptoms other than stated above.  Neuro: Denies symptoms other than stated above.  Skin: Denies symptoms other than stated above.    Current Medications:    Current Outpatient Medications:     HYDROcodone-acetaminophen (NORCO) 5-325 MG per tablet, Take 1 tablet by mouth every 6 hours as needed for Pain for up to 5 days. Intended supply: 5 days. Take lowest dose possible to manage pain Max Daily Amount: 4 tablets, Disp: 20 tablet, Rfl: 0    oxyCODONE-acetaminophen (PERCOCET)  MG per tablet, Take 1 tablet by mouth in the morning and at bedtime for 14 days. Max Daily Amount: 2 tablets, Disp: 28 tablet, Rfl: 0    oxyCODONE-acetaminophen (PERCOCET) 5-325 MG per tablet, Take 1 tablet by mouth daily as needed for Pain., Disp: , Rfl:     indomethacin (INDOCIN) 50 MG capsule, Take 1 capsule by mouth 3 times daily (with meals), Disp: 90 capsule, Rfl: 3    traZODone (DESYREL) 50 MG tablet, Take one or two at bedtime, Disp: 60 tablet, Rfl: 3    alfuzosin (UROXATRAL) 10 MG extended release tablet, take 1 tablet by mouth once daily (Patient taking differently: Take 1 tablet by mouth at bedtime), Disp: 30 tablet, Rfl: 3    amLODIPine-benazepril (LOTREL) 10-20 MG per capsule, TAKE 1 CAPSULE DAILY (Patient taking differently: nightly TAKE 1 CAPSULE DAILY), Disp: 90 capsule, Rfl: 3    tadalafil (CIALIS) 20 MG tablet, Take 1

## 2024-04-23 NOTE — PROGRESS NOTES
Patient is in today for post op of left foot. Patient says he is doing well with no issues. Pcp is Chase Khanna,   Last ov 4/3/24

## 2024-04-25 LAB — SURGICAL PATHOLOGY REPORT: NORMAL

## 2024-04-30 ENCOUNTER — OFFICE VISIT (OUTPATIENT)
Dept: PODIATRY | Age: 54
End: 2024-04-30

## 2024-04-30 VITALS — HEIGHT: 72 IN | BODY MASS INDEX: 31.15 KG/M2 | WEIGHT: 230 LBS

## 2024-04-30 DIAGNOSIS — M79.89 MASS OF SOFT TISSUE OF FOOT: Primary | ICD-10-CM

## 2024-04-30 PROCEDURE — 99024 POSTOP FOLLOW-UP VISIT: CPT | Performed by: PODIATRIST

## 2024-04-30 NOTE — PROGRESS NOTES
24     Erik Hurst    : 1970   Sex: male    Age: 53 y.o.    Patient's PCP/Provider is:  Chase Khanna DO    Subjective:  Patient is seen today for follow-up regarding continued care regarding excision soft tissue mass left foot.  Overall patient is doing well at this time with minimal issues noted.  Patient has kept the surgical site clean, dry, and intact as instructed.  No other additional abnormalities noted at this time.    Chief Complaint   Patient presents with    Post-Op Check     Left foot        ROS:  Const: Positives and pertinent negatives as per HPI.    Musculo: Denies symptoms other than stated above.  Neuro: Denies symptoms other than stated above.  Skin: Denies symptoms other than stated above.    Current Medications:    Current Outpatient Medications:     oxyCODONE-acetaminophen (PERCOCET)  MG per tablet, Take 1 tablet by mouth in the morning and at bedtime for 14 days. Max Daily Amount: 2 tablets, Disp: 28 tablet, Rfl: 0    oxyCODONE-acetaminophen (PERCOCET) 5-325 MG per tablet, Take 1 tablet by mouth daily as needed for Pain., Disp: , Rfl:     indomethacin (INDOCIN) 50 MG capsule, Take 1 capsule by mouth 3 times daily (with meals), Disp: 90 capsule, Rfl: 3    traZODone (DESYREL) 50 MG tablet, Take one or two at bedtime, Disp: 60 tablet, Rfl: 3    alfuzosin (UROXATRAL) 10 MG extended release tablet, take 1 tablet by mouth once daily (Patient taking differently: Take 1 tablet by mouth at bedtime), Disp: 30 tablet, Rfl: 3    amLODIPine-benazepril (LOTREL) 10-20 MG per capsule, TAKE 1 CAPSULE DAILY (Patient taking differently: nightly TAKE 1 CAPSULE DAILY), Disp: 90 capsule, Rfl: 3    tadalafil (CIALIS) 20 MG tablet, Take 1 tablet by mouth as needed for Erectile Dysfunction, Disp: 30 tablet, Rfl: 5    valACYclovir (VALTREX) 500 MG tablet, Take 1 tablet by mouth 2 times daily, Disp: 60 tablet, Rfl: 5    EPINEPHrine (EPIPEN 2-EVERTON) 0.3 MG/0.3ML SOAJ injection, Inject 0.3 mLs into

## 2024-05-07 ENCOUNTER — OFFICE VISIT (OUTPATIENT)
Dept: PODIATRY | Age: 54
End: 2024-05-07

## 2024-05-07 VITALS — WEIGHT: 230 LBS | HEIGHT: 72 IN | BODY MASS INDEX: 31.15 KG/M2

## 2024-05-07 DIAGNOSIS — M79.89 MASS OF SOFT TISSUE OF FOOT: Primary | ICD-10-CM

## 2024-05-07 PROCEDURE — 99024 POSTOP FOLLOW-UP VISIT: CPT | Performed by: PODIATRIST

## 2024-05-07 NOTE — PROGRESS NOTES
Patient here for post-op left foot soft tissue mass. Sugrery date 4/18/2024. Patient states he is feeling well. Chase Khanna DO last visit 4/3/2024   Electronically signed by Anny Nayak LPN on 5/7/2024 at 11:31 AM

## 2024-05-07 NOTE — PROGRESS NOTES
24     Erik Hurst    : 1970   Sex: male    Age: 53 y.o.    Patient's PCP/Provider is:  Chase Khanna DO    Subjective:  Patient is seen today for follow-up regarding continued care excision soft tissue mass left foot.  Overall patient is doing great at this time with minimal issues noted.  Patient stated he did get the patient stated he did get the incisional site wet over the last 2 days.  Patient is in no acute distress.  He denies any nausea, vomiting, fever, chills.  Patient has been wearing his regular shoe gear to tolerance.  No other additional abnormalities noted.    Chief Complaint   Patient presents with    Post-Op Check     Post op mass of soft tissue left foot       ROS:  Const: Positives and pertinent negatives as per HPI.    Musculo: Denies symptoms other than stated above.  Neuro: Denies symptoms other than stated above.  Skin: Denies symptoms other than stated above.    Current Medications:    Current Outpatient Medications:     oxyCODONE-acetaminophen (PERCOCET) 5-325 MG per tablet, Take 1 tablet by mouth daily as needed for Pain., Disp: , Rfl:     indomethacin (INDOCIN) 50 MG capsule, Take 1 capsule by mouth 3 times daily (with meals), Disp: 90 capsule, Rfl: 3    traZODone (DESYREL) 50 MG tablet, Take one or two at bedtime, Disp: 60 tablet, Rfl: 3    alfuzosin (UROXATRAL) 10 MG extended release tablet, take 1 tablet by mouth once daily (Patient taking differently: Take 1 tablet by mouth at bedtime), Disp: 30 tablet, Rfl: 3    amLODIPine-benazepril (LOTREL) 10-20 MG per capsule, TAKE 1 CAPSULE DAILY (Patient taking differently: nightly TAKE 1 CAPSULE DAILY), Disp: 90 capsule, Rfl: 3    tadalafil (CIALIS) 20 MG tablet, Take 1 tablet by mouth as needed for Erectile Dysfunction, Disp: 30 tablet, Rfl: 5    valACYclovir (VALTREX) 500 MG tablet, Take 1 tablet by mouth 2 times daily, Disp: 60 tablet, Rfl: 5    EPINEPHrine (EPIPEN 2-EVERTON) 0.3 MG/0.3ML SOAJ injection, Inject 0.3 mLs into

## 2024-05-23 ENCOUNTER — OFFICE VISIT (OUTPATIENT)
Dept: PODIATRY | Age: 54
End: 2024-05-23

## 2024-05-23 VITALS — HEIGHT: 72 IN | BODY MASS INDEX: 32.23 KG/M2 | WEIGHT: 238 LBS

## 2024-05-23 DIAGNOSIS — M79.89 MASS OF SOFT TISSUE OF FOOT: Primary | ICD-10-CM

## 2024-05-23 PROCEDURE — 99024 POSTOP FOLLOW-UP VISIT: CPT | Performed by: PODIATRIST

## 2024-05-23 NOTE — PROGRESS NOTES
24     Erik Hurst    : 1970   Sex: male    Age: 53 y.o.    Patient's PCP/Provider is:  Chase Khanna DO    Subjective:  Patient is seen today for follow-up regarding need care postop management surgical excision soft tissue mass left foot.  Patient is doing well overall, but has noticed some mild serous drainage from the central aspect of the incisional area.  Patient is in no acute distress.  He denies any nausea, vomiting, fever, chills.  No other additional abnormalities noted at this time.  He does present wearing his normal shoe gear and socks.    Chief Complaint   Patient presents with    Post-Op Check     Post-op soft tissue mass left foot       ROS:  Const: Positives and pertinent negatives as per HPI.    Musculo: Denies symptoms other than stated above.  Neuro: Denies symptoms other than stated above.  Skin: Denies symptoms other than stated above.    Current Medications:    Current Outpatient Medications:     oxyCODONE-acetaminophen (PERCOCET) 5-325 MG per tablet, Take 1 tablet by mouth daily as needed for Pain., Disp: , Rfl:     indomethacin (INDOCIN) 50 MG capsule, Take 1 capsule by mouth 3 times daily (with meals), Disp: 90 capsule, Rfl: 3    traZODone (DESYREL) 50 MG tablet, Take one or two at bedtime, Disp: 60 tablet, Rfl: 3    alfuzosin (UROXATRAL) 10 MG extended release tablet, take 1 tablet by mouth once daily (Patient taking differently: Take 1 tablet by mouth at bedtime), Disp: 30 tablet, Rfl: 3    amLODIPine-benazepril (LOTREL) 10-20 MG per capsule, TAKE 1 CAPSULE DAILY (Patient taking differently: nightly TAKE 1 CAPSULE DAILY), Disp: 90 capsule, Rfl: 3    tadalafil (CIALIS) 20 MG tablet, Take 1 tablet by mouth as needed for Erectile Dysfunction, Disp: 30 tablet, Rfl: 5    valACYclovir (VALTREX) 500 MG tablet, Take 1 tablet by mouth 2 times daily, Disp: 60 tablet, Rfl: 5    EPINEPHrine (EPIPEN 2-EVERTON) 0.3 MG/0.3ML SOAJ injection, Inject 0.3 mLs into the muscle once for 1 dose

## 2024-05-23 NOTE — PROGRESS NOTES
Patient here for post-op left foot soft tissue mass. Patient is still having pain in area. Surgery date 4/18/2024. Chase Khanna DO last visit 4/3/2024.  Electronically signed by Anny Nayak LPN on 5/23/2024 at 11:25 AM

## 2024-05-26 DIAGNOSIS — R39.14 BENIGN PROSTATIC HYPERPLASIA WITH INCOMPLETE BLADDER EMPTYING: ICD-10-CM

## 2024-05-26 DIAGNOSIS — N40.1 BENIGN PROSTATIC HYPERPLASIA WITH INCOMPLETE BLADDER EMPTYING: ICD-10-CM

## 2024-05-28 RX ORDER — ALFUZOSIN HYDROCHLORIDE 10 MG/1
10 TABLET, EXTENDED RELEASE ORAL DAILY
Qty: 30 TABLET | Refills: 3 | Status: SHIPPED | OUTPATIENT
Start: 2024-05-28

## 2024-05-30 ENCOUNTER — OFFICE VISIT (OUTPATIENT)
Dept: PODIATRY | Age: 54
End: 2024-05-30

## 2024-05-30 VITALS — WEIGHT: 238 LBS | HEIGHT: 72 IN | BODY MASS INDEX: 32.23 KG/M2

## 2024-05-30 DIAGNOSIS — M79.89 MASS OF SOFT TISSUE OF LEFT LOWER EXTREMITY: Primary | ICD-10-CM

## 2024-05-30 PROCEDURE — 99024 POSTOP FOLLOW-UP VISIT: CPT | Performed by: PODIATRIST

## 2024-05-30 NOTE — PROGRESS NOTES
Patient is in today for 1 week post op of left foot. Patient says he is still having pain in that foot. Pcp is Chase Khanna,   Last ov 4/3/24

## 2024-05-30 NOTE — PROGRESS NOTES
24     Erik Hurst    : 1970   Sex: male    Age: 53 y.o.    Patient's PCP/Provider is:  Chase Khanna DO    Subjective:  Patient is seen today for follow-up regarding continued postop management excision soft tissue mass left foot.  Overall patient still having some mild serous drainage issues from the site.  Patient is in no acute distress.  He denies any nausea, vomiting, fever, chills.  No other additional abnormalities noted at this time.    Chief Complaint   Patient presents with    Post-Op Check     Left foot        ROS:  Const: Positives and pertinent negatives as per HPI.    Musculo: Denies symptoms other than stated above.  Neuro: Denies symptoms other than stated above.  Skin: Denies symptoms other than stated above.    Current Medications:    Current Outpatient Medications:     alfuzosin (UROXATRAL) 10 MG extended release tablet, take 1 tablet by mouth once daily, Disp: 30 tablet, Rfl: 3    oxyCODONE-acetaminophen (PERCOCET) 5-325 MG per tablet, Take 1 tablet by mouth daily as needed for Pain., Disp: , Rfl:     indomethacin (INDOCIN) 50 MG capsule, Take 1 capsule by mouth 3 times daily (with meals), Disp: 90 capsule, Rfl: 3    traZODone (DESYREL) 50 MG tablet, Take one or two at bedtime, Disp: 60 tablet, Rfl: 3    amLODIPine-benazepril (LOTREL) 10-20 MG per capsule, TAKE 1 CAPSULE DAILY (Patient taking differently: nightly TAKE 1 CAPSULE DAILY), Disp: 90 capsule, Rfl: 3    tadalafil (CIALIS) 20 MG tablet, Take 1 tablet by mouth as needed for Erectile Dysfunction, Disp: 30 tablet, Rfl: 5    valACYclovir (VALTREX) 500 MG tablet, Take 1 tablet by mouth 2 times daily, Disp: 60 tablet, Rfl: 5    EPINEPHrine (EPIPEN 2-EVERTON) 0.3 MG/0.3ML SOAJ injection, Inject 0.3 mLs into the muscle once for 1 dose Use as directed for allergic reaction, Disp: 2 each, Rfl: 5    Allergies:  Allergies   Allergen Reactions    Peanuts [Peanut Oil] Anaphylaxis     ONLY PEANUTS..... NOT PEANUT OIL        Vitals:

## 2024-06-03 DIAGNOSIS — Z12.5 SCREENING FOR MALIGNANT NEOPLASM OF PROSTATE: ICD-10-CM

## 2024-06-03 DIAGNOSIS — E78.5 HYPERLIPIDEMIA, UNSPECIFIED HYPERLIPIDEMIA TYPE: ICD-10-CM

## 2024-06-03 DIAGNOSIS — R73.01 IFG (IMPAIRED FASTING GLUCOSE): ICD-10-CM

## 2024-06-03 DIAGNOSIS — R79.89 ABNORMAL LFTS: ICD-10-CM

## 2024-06-03 DIAGNOSIS — E78.2 MIXED HYPERLIPIDEMIA: ICD-10-CM

## 2024-06-03 DIAGNOSIS — R53.82 CHRONIC FATIGUE: ICD-10-CM

## 2024-06-03 LAB
BASOPHILS ABSOLUTE: 0.08 K/UL (ref 0–0.2)
BASOPHILS RELATIVE PERCENT: 1 % (ref 0–2)
EOSINOPHILS ABSOLUTE: 0.11 K/UL (ref 0.05–0.5)
EOSINOPHILS RELATIVE PERCENT: 2 % (ref 0–6)
HCT VFR BLD CALC: 41 % (ref 37–54)
HEMOGLOBIN: 13.6 G/DL (ref 12.5–16.5)
IMMATURE GRANULOCYTES %: 1 % (ref 0–5)
IMMATURE GRANULOCYTES ABSOLUTE: 0.05 K/UL (ref 0–0.58)
LYMPHOCYTES ABSOLUTE: 1.57 K/UL (ref 1.5–4)
LYMPHOCYTES RELATIVE PERCENT: 24 % (ref 20–42)
MCH RBC QN AUTO: 34 PG (ref 26–35)
MCHC RBC AUTO-ENTMCNC: 33.2 G/DL (ref 32–34.5)
MCV RBC AUTO: 102.5 FL (ref 80–99.9)
MONOCYTES ABSOLUTE: 0.53 K/UL (ref 0.1–0.95)
MONOCYTES RELATIVE PERCENT: 8 % (ref 2–12)
NEUTROPHILS ABSOLUTE: 4.34 K/UL (ref 1.8–7.3)
NEUTROPHILS RELATIVE PERCENT: 65 % (ref 43–80)
PDW BLD-RTO: 13.5 % (ref 11.5–15)
PLATELET # BLD: 342 K/UL (ref 130–450)
PMV BLD AUTO: 9.9 FL (ref 7–12)
RBC # BLD: 4 M/UL (ref 3.8–5.8)
WBC # BLD: 6.7 K/UL (ref 4.5–11.5)

## 2024-06-04 LAB
ALBUMIN: 4.6 G/DL (ref 3.5–5.2)
ALP BLD-CCNC: 38 U/L (ref 40–129)
ALT SERPL-CCNC: 143 U/L (ref 0–40)
ANION GAP SERPL CALCULATED.3IONS-SCNC: 19 MMOL/L (ref 7–16)
AST SERPL-CCNC: 105 U/L (ref 0–39)
BILIRUB SERPL-MCNC: 0.6 MG/DL (ref 0–1.2)
BUN BLDV-MCNC: 10 MG/DL (ref 6–20)
CHLORIDE BLD-SCNC: 99 MMOL/L (ref 98–107)
CHOLESTEROL, TOTAL: 202 MG/DL
CO2: 17 MMOL/L (ref 22–29)
CREAT SERPL-MCNC: 0.8 MG/DL (ref 0.7–1.2)
GFR, ESTIMATED: >90 ML/MIN/1.73M2
GGT, 20027: 44 U/L (ref 10–71)
GLUCOSE BLD-MCNC: 84 MG/DL (ref 74–99)
HDLC SERPL-MCNC: 104 MG/DL
LDL CHOLESTEROL: 89 MG/DL
POTASSIUM SERPL-SCNC: 5 MMOL/L (ref 3.5–5)
PROSTATE SPECIFIC ANTIGEN: 0.43 NG/ML (ref 0–4)
SODIUM BLD-SCNC: 135 MMOL/L (ref 132–146)
T4 FREE: 1.2 NG/DL (ref 0.9–1.7)
TOTAL PROTEIN: 7.2 G/DL (ref 6.4–8.3)
TRIGL SERPL-MCNC: 44 MG/DL
TSH SERPL DL<=0.05 MIU/L-ACNC: 0.72 UIU/ML (ref 0.27–4.2)
VLDLC SERPL CALC-MCNC: 9 MG/DL

## 2024-06-05 ENCOUNTER — OFFICE VISIT (OUTPATIENT)
Dept: PODIATRY | Age: 54
End: 2024-06-05

## 2024-06-05 ENCOUNTER — OFFICE VISIT (OUTPATIENT)
Dept: FAMILY MEDICINE CLINIC | Age: 54
End: 2024-06-05
Payer: COMMERCIAL

## 2024-06-05 VITALS
OXYGEN SATURATION: 98 % | HEART RATE: 87 BPM | WEIGHT: 227 LBS | DIASTOLIC BLOOD PRESSURE: 76 MMHG | SYSTOLIC BLOOD PRESSURE: 116 MMHG | BODY MASS INDEX: 30.75 KG/M2 | HEIGHT: 72 IN | RESPIRATION RATE: 18 BRPM

## 2024-06-05 VITALS — HEIGHT: 72 IN | WEIGHT: 238 LBS | BODY MASS INDEX: 32.23 KG/M2

## 2024-06-05 DIAGNOSIS — M79.89 MASS OF SOFT TISSUE OF LEFT LOWER EXTREMITY: Primary | ICD-10-CM

## 2024-06-05 DIAGNOSIS — M54.50 LUMBAR BACK PAIN: ICD-10-CM

## 2024-06-05 DIAGNOSIS — R29.818 NEUROGENIC CLAUDICATION: ICD-10-CM

## 2024-06-05 DIAGNOSIS — M51.16 LUMBAR DISC DISEASE WITH RADICULOPATHY: ICD-10-CM

## 2024-06-05 DIAGNOSIS — R79.89 ABNORMAL LFTS: Primary | ICD-10-CM

## 2024-06-05 DIAGNOSIS — F51.01 PRIMARY INSOMNIA: ICD-10-CM

## 2024-06-05 PROCEDURE — 99024 POSTOP FOLLOW-UP VISIT: CPT | Performed by: PODIATRIST

## 2024-06-05 PROCEDURE — 3074F SYST BP LT 130 MM HG: CPT | Performed by: FAMILY MEDICINE

## 2024-06-05 PROCEDURE — 3078F DIAST BP <80 MM HG: CPT | Performed by: FAMILY MEDICINE

## 2024-06-05 PROCEDURE — 99214 OFFICE O/P EST MOD 30 MIN: CPT | Performed by: FAMILY MEDICINE

## 2024-06-05 RX ORDER — ZALEPLON 10 MG/1
10 CAPSULE ORAL NIGHTLY
Qty: 30 CAPSULE | Refills: 0 | Status: SHIPPED | OUTPATIENT
Start: 2024-06-05 | End: 2024-07-05

## 2024-06-05 RX ORDER — OXYCODONE AND ACETAMINOPHEN 10; 325 MG/1; MG/1
1 TABLET ORAL EVERY 8 HOURS PRN
Qty: 90 TABLET | Refills: 0 | Status: SHIPPED | OUTPATIENT
Start: 2024-06-05 | End: 2024-07-05

## 2024-06-08 ASSESSMENT — ENCOUNTER SYMPTOMS
BACK PAIN: 1
COUGH: 0
SHORTNESS OF BREATH: 0
DIARRHEA: 0
SORE THROAT: 0
COLOR CHANGE: 0
BLOOD IN STOOL: 0
CHOKING: 0
ABDOMINAL DISTENTION: 0
FACIAL SWELLING: 0
CONSTIPATION: 0
EYE REDNESS: 0
TROUBLE SWALLOWING: 0
EYE PAIN: 0
PHOTOPHOBIA: 0
APNEA: 0
ABDOMINAL PAIN: 0
RECTAL PAIN: 0
STRIDOR: 0
NAUSEA: 0
ANAL BLEEDING: 0
EYE DISCHARGE: 0
CHEST TIGHTNESS: 0
WHEEZING: 0
EYE ITCHING: 0
VOMITING: 0
VOICE CHANGE: 0
RHINORRHEA: 0
SINUS PRESSURE: 0

## 2024-06-08 NOTE — PROGRESS NOTES
Erik Hurst is a 53 y.o. male  .  Subjective:      Was seen by physical medicine. They decided that he would be best served if he follows back up with back surgery who is Dr Pruitt. Has upcoming appointment. Discussed pain meds. He is to use them very sparingly.Patient understands habit forming nature of medication and assumes all risks. Patient will not drink alcohol , work or drive while on medication. Discussed sleep medication. Trazodone not working well. LFT still elevated. We will start with US of liver. Avoid tylenol and alcohol.         Review of Systems   Constitutional:  Positive for fatigue. Negative for activity change, appetite change, chills, diaphoresis, fever and unexpected weight change.   HENT:  Negative for congestion, dental problem, drooling, ear discharge, ear pain, facial swelling, hearing loss, mouth sores, nosebleeds, postnasal drip, rhinorrhea, sinus pressure, sneezing, sore throat, tinnitus, trouble swallowing and voice change.    Eyes:  Negative for photophobia, pain, discharge, redness, itching and visual disturbance.   Respiratory:  Negative for apnea, cough, choking, chest tightness, shortness of breath, wheezing and stridor.    Cardiovascular:  Negative for chest pain, palpitations and leg swelling.   Gastrointestinal:  Negative for abdominal distention, abdominal pain, anal bleeding, blood in stool, constipation, diarrhea, nausea, rectal pain and vomiting.   Endocrine: Negative for cold intolerance, heat intolerance, polydipsia, polyphagia and polyuria.   Genitourinary:  Negative for decreased urine volume, difficulty urinating, dysuria, enuresis, flank pain, frequency, genital sores, hematuria, penile discharge, penile pain, penile swelling, scrotal swelling, testicular pain and urgency.   Musculoskeletal:  Positive for arthralgias, back pain and gait problem. Negative for joint swelling, myalgias, neck pain and neck stiffness.   Skin:  Negative for color change, pallor, rash and

## 2024-06-11 ENCOUNTER — OFFICE VISIT (OUTPATIENT)
Dept: ORTHOPEDIC SURGERY | Age: 54
End: 2024-06-11
Payer: COMMERCIAL

## 2024-06-11 VITALS — HEIGHT: 72 IN | WEIGHT: 227 LBS | BODY MASS INDEX: 30.75 KG/M2

## 2024-06-11 DIAGNOSIS — M25.811 IMPINGEMENT OF RIGHT SHOULDER: Primary | ICD-10-CM

## 2024-06-11 DIAGNOSIS — M75.21 BICEPS TENDINITIS OF RIGHT SHOULDER: ICD-10-CM

## 2024-06-11 DIAGNOSIS — M25.511 CHRONIC RIGHT SHOULDER PAIN: ICD-10-CM

## 2024-06-11 DIAGNOSIS — M75.81 TENDINITIS OF RIGHT ROTATOR CUFF: ICD-10-CM

## 2024-06-11 DIAGNOSIS — G89.29 CHRONIC RIGHT SHOULDER PAIN: ICD-10-CM

## 2024-06-11 PROCEDURE — 20610 DRAIN/INJ JOINT/BURSA W/O US: CPT | Performed by: ORTHOPAEDIC SURGERY

## 2024-06-11 PROCEDURE — 99214 OFFICE O/P EST MOD 30 MIN: CPT | Performed by: ORTHOPAEDIC SURGERY

## 2024-06-11 RX ORDER — TRIAMCINOLONE ACETONIDE 40 MG/ML
80 INJECTION, SUSPENSION INTRA-ARTICULAR; INTRAMUSCULAR ONCE
Status: COMPLETED | OUTPATIENT
Start: 2024-06-11 | End: 2024-06-11

## 2024-06-11 RX ADMIN — TRIAMCINOLONE ACETONIDE 80 MG: 40 INJECTION, SUSPENSION INTRA-ARTICULAR; INTRAMUSCULAR at 11:48

## 2024-06-11 ASSESSMENT — ENCOUNTER SYMPTOMS
ALLERGIC/IMMUNOLOGIC NEGATIVE: 1
SHORTNESS OF BREATH: 0
ABDOMINAL DISTENTION: 0
EYE DISCHARGE: 0

## 2024-06-11 NOTE — PROGRESS NOTES
Erik Hurst (:  1970) is a 53 y.o. male,Established patient, here for evaluation of the following chief complaint(s):  Follow-up (Right humerus and shoulder MRI F/u. At rest pain 1/10. Laying on it or with increased activity 6/10. )      Assessment & Plan   ASSESSMENT/PLAN:  1. Impingement of right shoulder  2. Chronic right shoulder pain  3. Biceps tendinitis of right shoulder  4. Tendinitis of right rotator cuff      This is a 53 y.o. year old male with Impingement of right shoulder [M25.811]     MRI without concerning findings of malignancy    I discussed a variety of treatment options with the patient today including observation, NSAID, low impact exercise, PT, injections. He would like to proceed with injection and continued HEP.    We discussed the risks and benefits of a corticosteroid injection in the right subacromial space. The patient would like to proceed and consents to the procedure. The patient should let us know if they develop any signs of infection, worsening pain or other problems. I discussed if they have diabetes that they should closely monitor their blood glucose level over the next few days.    The right subacromial space was identified and prepped sterilely. Using 2 cc of 40 mg Kenalog and 4 cc of 0.25% bupivacaine the right subacromial space was injected without issue. This was via a posterior portal. The patient tolerated this procedure well and a Band-Aid was placed.      Return if symptoms worsen or fail to improve.     May need surgery in the future    Subjective   SUBJECTIVE/OBJECTIVE:  HPI    53-year-old male here today to discuss his right shoulder.  MRI was obtained which I will discuss below. He continues to have difficulty with overhead activity.  He denies any previous trauma.  He has been using anti-inflammatories and was previously on a home exercise program without relief. He underwent PT without improvement. Denies numbness and tingling and does not have a

## 2024-06-13 ENCOUNTER — OFFICE VISIT (OUTPATIENT)
Dept: PODIATRY | Age: 54
End: 2024-06-13

## 2024-06-13 VITALS — BODY MASS INDEX: 30.75 KG/M2 | HEIGHT: 72 IN | WEIGHT: 227 LBS

## 2024-06-13 DIAGNOSIS — M79.89 MASS OF SOFT TISSUE OF FOOT: Primary | ICD-10-CM

## 2024-06-13 PROCEDURE — 99024 POSTOP FOLLOW-UP VISIT: CPT | Performed by: PODIATRIST

## 2024-06-13 NOTE — PROGRESS NOTES
24     Erik Hurst    : 1970   Sex: male    Age: 53 y.o.    Patient's PCP/Provider is:  Chase Khanna DO    Subjective:  Patient is seen today for follow-up regarding continued care regarding excision soft tissue mass left foot with mild surgical site dehiscence noted.  Patient has been applying the topical dressings as instructed with improvement noted.  Patient is not having as significant symptoms into the lateral left foot region.  Patient is wearing his regular shoe gear and performing daily activities to tolerance.  No other additional abnormalities noted at this time.    Chief Complaint   Patient presents with    Post-Op Check     Left foot        ROS:  Const: Positives and pertinent negatives as per HPI.    Musculo: Denies symptoms other than stated above.  Neuro: Denies symptoms other than stated above.  Skin: Denies symptoms other than stated above.    Current Medications:    Current Outpatient Medications:     oxyCODONE-acetaminophen (PERCOCET)  MG per tablet, Take 1 tablet by mouth every 8 hours as needed for Pain for up to 30 days. Intended supply: 30 days Max Daily Amount: 3 tablets, Disp: 90 tablet, Rfl: 0    zaleplon (SONATA) 10 MG capsule, Take 1 capsule by mouth nightly for 30 days. Max Daily Amount: 10 mg, Disp: 30 capsule, Rfl: 0    alfuzosin (UROXATRAL) 10 MG extended release tablet, take 1 tablet by mouth once daily, Disp: 30 tablet, Rfl: 3    amLODIPine-benazepril (LOTREL) 10-20 MG per capsule, TAKE 1 CAPSULE DAILY (Patient taking differently: nightly TAKE 1 CAPSULE DAILY), Disp: 90 capsule, Rfl: 3    tadalafil (CIALIS) 20 MG tablet, Take 1 tablet by mouth as needed for Erectile Dysfunction, Disp: 30 tablet, Rfl: 5    valACYclovir (VALTREX) 500 MG tablet, Take 1 tablet by mouth 2 times daily, Disp: 60 tablet, Rfl: 5    EPINEPHrine (EPIPEN 2-EVERTON) 0.3 MG/0.3ML SOAJ injection, Inject 0.3 mLs into the muscle once for 1 dose Use as directed for allergic reaction, Disp: 2

## 2024-06-19 ENCOUNTER — OFFICE VISIT (OUTPATIENT)
Dept: PODIATRY | Age: 54
End: 2024-06-19

## 2024-06-19 VITALS — HEIGHT: 72 IN | WEIGHT: 213 LBS | BODY MASS INDEX: 28.85 KG/M2

## 2024-06-19 DIAGNOSIS — M79.89 MASS OF SOFT TISSUE OF FOOT: Primary | ICD-10-CM

## 2024-06-19 PROCEDURE — 99024 POSTOP FOLLOW-UP VISIT: CPT | Performed by: PODIATRIST

## 2024-06-19 NOTE — PROGRESS NOTES
Patient here for post-op left foot mass. Surgery date 4/18/2024 Chase Khanna DO   Electronically signed by Anny Nayak LPN on 6/19/2024 at 10:59 AM

## 2024-06-19 NOTE — PROGRESS NOTES
24     Erik Hurst    : 1970   Sex: male    Age: 53 y.o.    Patient's PCP/Provider is:  Chase Khanna DO    Subjective:  Patient is seen today for follow-up regarding continued care excision soft tissue mass left foot.  Overall patient is doing better at this time with current medication being applied topically.  Patient is back in his regular shoe gear and performing activities to tolerance.  Patient has noticed steady improvement with pain reduction left foot.  No other additional abnormalities noted at this time.    Chief Complaint   Patient presents with    Post-Op Check     Mass of left foot ()       ROS:  Const: Positives and pertinent negatives as per HPI.    Musculo: Denies symptoms other than stated above.  Neuro: Denies symptoms other than stated above.  Skin: Denies symptoms other than stated above.    Current Medications:    Current Outpatient Medications:     oxyCODONE-acetaminophen (PERCOCET)  MG per tablet, Take 1 tablet by mouth every 8 hours as needed for Pain for up to 30 days. Intended supply: 30 days Max Daily Amount: 3 tablets, Disp: 90 tablet, Rfl: 0    zaleplon (SONATA) 10 MG capsule, Take 1 capsule by mouth nightly for 30 days. Max Daily Amount: 10 mg, Disp: 30 capsule, Rfl: 0    alfuzosin (UROXATRAL) 10 MG extended release tablet, take 1 tablet by mouth once daily, Disp: 30 tablet, Rfl: 3    amLODIPine-benazepril (LOTREL) 10-20 MG per capsule, TAKE 1 CAPSULE DAILY (Patient taking differently: nightly TAKE 1 CAPSULE DAILY), Disp: 90 capsule, Rfl: 3    tadalafil (CIALIS) 20 MG tablet, Take 1 tablet by mouth as needed for Erectile Dysfunction, Disp: 30 tablet, Rfl: 5    valACYclovir (VALTREX) 500 MG tablet, Take 1 tablet by mouth 2 times daily, Disp: 60 tablet, Rfl: 5    EPINEPHrine (EPIPEN 2-EVERTON) 0.3 MG/0.3ML SOAJ injection, Inject 0.3 mLs into the muscle once for 1 dose Use as directed for allergic reaction, Disp: 2 each, Rfl: 5    Allergies:  Allergies

## 2024-06-27 ENCOUNTER — OFFICE VISIT (OUTPATIENT)
Dept: PODIATRY | Age: 54
End: 2024-06-27

## 2024-06-27 VITALS — HEIGHT: 72 IN | BODY MASS INDEX: 28.85 KG/M2 | WEIGHT: 213 LBS

## 2024-06-27 DIAGNOSIS — M79.89 MASS OF SOFT TISSUE OF FOOT: Primary | ICD-10-CM

## 2024-06-27 PROCEDURE — 99024 POSTOP FOLLOW-UP VISIT: CPT | Performed by: PODIATRIST

## 2024-06-27 NOTE — PROGRESS NOTES
Patient here for soft mass on left foot. Chase Khanna DO last visit 6/5/2024   Electronically signed by Anny Nayak LPN on 6/27/2024 at 11:31 AM

## 2024-06-27 NOTE — PROGRESS NOTES
24     Erik Hurst    : 1970   Sex: male    Age: 53 y.o.    Patient's PCP/Provider is:  Chase Khanna DO    Subjective:  Patient is seen today for follow-up regarding continued care surgical site mild dehiscence left foot.  Patient has been applying the topical dressings as instructed without issues noted.  Patient is having some mild discomfort in the area with certain shoe gear and activities.  Patient is in no acute distress.  He denies any nausea, vomiting, fever, chills.  No other additional abnormalities noted.    Chief Complaint   Patient presents with    Wound Check     Mass of soft tissue left foot       ROS:  Const: Positives and pertinent negatives as per HPI.    Musculo: Denies symptoms other than stated above.  Neuro: Denies symptoms other than stated above.  Skin: Denies symptoms other than stated above.    Current Medications:    Current Outpatient Medications:     oxyCODONE-acetaminophen (PERCOCET)  MG per tablet, Take 1 tablet by mouth every 8 hours as needed for Pain for up to 30 days. Intended supply: 30 days Max Daily Amount: 3 tablets, Disp: 90 tablet, Rfl: 0    zaleplon (SONATA) 10 MG capsule, Take 1 capsule by mouth nightly for 30 days. Max Daily Amount: 10 mg, Disp: 30 capsule, Rfl: 0    alfuzosin (UROXATRAL) 10 MG extended release tablet, take 1 tablet by mouth once daily, Disp: 30 tablet, Rfl: 3    amLODIPine-benazepril (LOTREL) 10-20 MG per capsule, TAKE 1 CAPSULE DAILY (Patient taking differently: nightly TAKE 1 CAPSULE DAILY), Disp: 90 capsule, Rfl: 3    tadalafil (CIALIS) 20 MG tablet, Take 1 tablet by mouth as needed for Erectile Dysfunction, Disp: 30 tablet, Rfl: 5    valACYclovir (VALTREX) 500 MG tablet, Take 1 tablet by mouth 2 times daily, Disp: 60 tablet, Rfl: 5    EPINEPHrine (EPIPEN 2-EVERTON) 0.3 MG/0.3ML SOAJ injection, Inject 0.3 mLs into the muscle once for 1 dose Use as directed for allergic reaction, Disp: 2 each, Rfl: 5    Allergies:  Allergies

## 2024-07-03 ENCOUNTER — OFFICE VISIT (OUTPATIENT)
Dept: PODIATRY | Age: 54
End: 2024-07-03

## 2024-07-03 VITALS — HEIGHT: 72 IN | BODY MASS INDEX: 28.85 KG/M2 | WEIGHT: 213 LBS

## 2024-07-03 DIAGNOSIS — M79.89 MASS OF SOFT TISSUE OF FOOT: Primary | ICD-10-CM

## 2024-07-03 PROCEDURE — 99024 POSTOP FOLLOW-UP VISIT: CPT | Performed by: PODIATRIST

## 2024-07-03 NOTE — PROGRESS NOTES
Patient is in today for 1 week post op of left foot. Pcp is Chase Khanna,   Last ov 4/3/24  
Oil] Anaphylaxis     ONLY PEANUTS..... NOT PEANUT OIL        Vitals:    07/03/24 1127   Weight: 96.6 kg (213 lb)   Height: 1.829 m (6' 0.01\")       Exam:  Neurovascular status unchanged.  Surgical site stable with mild central dehiscence noted approximately 0.3 cm in length.  Minimal depth noted at this time.  No purulence, odor, erythema, calor, ecchymosis or any signs of infection noted left foot.  Adequate range of motion noted left ankle, subtalar joint, and MTPJ regions.  Stable gait noted with current supportive shoe gear intact left.      Diagnostic Studies:     No results found.      Procedures:    None    Plan Per Assessment  Erik was seen today for wound check.    Diagnoses and all orders for this visit:    Mass of soft tissue of foot      Postoperative evaluation and management  Surgical site evaluated left foot, Betadine and dry dressing applied.  Patient was advised on proper medicine and dressing applications daily, leaving area open to air at night for several hours to allow for improvement and complete healing.  Patient is to continue wearing his good supportive shoe gear with every day ambulatory activities.  Patient will be followed up at a later date for continued evaluation and management.  He was advised to call the office with any questions or concerns in the interim.      Seen By:    Nikos Ayoub Jr, DPM    Electronically signed by Nikos Ayoub Jr, DPM on 7/3/2024 at 11:47 AM    This note was created using voice recognition software.  The note was reviewed however may contain grammatical errors.

## 2024-07-09 ENCOUNTER — OFFICE VISIT (OUTPATIENT)
Dept: ORTHOPEDIC SURGERY | Facility: CLINIC | Age: 54
End: 2024-07-09
Payer: COMMERCIAL

## 2024-07-09 DIAGNOSIS — M54.50 LUMBAR PAIN: ICD-10-CM

## 2024-07-09 PROCEDURE — 99213 OFFICE O/P EST LOW 20 MIN: CPT | Performed by: ORTHOPAEDIC SURGERY

## 2024-07-09 NOTE — PROGRESS NOTES
Mg returns for longer-term follow-up.  He is having fatiguing low back pain.  No radiating leg pain.    He has been working on an exercise program.  He has lost weight electively.  He uses Indocin as an anti-inflammatory, primarily for gout type pain.    On exam his posture is upright.  His strength is intact.    Prior x-rays have showed his fusion appears to be solid.    With the recurrence of pain, we will obtain a CT scan to fully assess his fusion.    I have also suggested a trial of meloxicam.    If there is no abnormality on CT scan, then perhaps a sacroiliac joint injection as he is localizing his pain to the paraspinal region at the base of his spine.    I will speak with him after his CT scan is done.    ** Dictated with voice recognition software and not immediately reviewed for errors in grammar and/or spelling **

## 2024-07-09 NOTE — LETTER
July 9, 2024     Cole Ding DO  [address]  [place] [address]    Patient: Oscar Szymanski   YOB: 1970   Date of Visit: 7/9/2024       Dear Dr. Cole Ding, :    Thank you for referring Oscar Szymanski to me for evaluation. Below are my notes for this consultation.  If you have questions, please do not hesitate to call me. I look forward to following your patient along with you.       Sincerely,     Jeffery Moreira MD      CC: No Recipients  ______________________________________________________________________________________    Mg returns for longer-term follow-up.  He is having fatiguing low back pain.  No radiating leg pain.    He has been working on an exercise program.  He has lost weight electively.  He uses Indocin as an anti-inflammatory, primarily for gout type pain.    On exam his posture is upright.  His strength is intact.    Prior x-rays have showed his fusion appears to be solid.    With the recurrence of pain, we will obtain a CT scan to fully assess his fusion.    I have also suggested a trial of meloxicam.    If there is no abnormality on CT scan, then perhaps a sacroiliac joint injection as he is localizing his pain to the paraspinal region at the base of his spine.    I will speak with him after his CT scan is done.    ** Dictated with voice recognition software and not immediately reviewed for errors in grammar and/or spelling **

## 2024-07-10 ENCOUNTER — OFFICE VISIT (OUTPATIENT)
Dept: PODIATRY | Age: 54
End: 2024-07-10
Payer: COMMERCIAL

## 2024-07-10 VITALS — WEIGHT: 213 LBS | HEIGHT: 72 IN | BODY MASS INDEX: 28.85 KG/M2

## 2024-07-10 DIAGNOSIS — M79.89 MASS OF SOFT TISSUE OF FOOT: Primary | ICD-10-CM

## 2024-07-10 PROCEDURE — 99213 OFFICE O/P EST LOW 20 MIN: CPT | Performed by: PODIATRIST

## 2024-07-10 NOTE — PROGRESS NOTES
7/10/24     Erik Hurst    : 1970   Sex: male    Age: 53 y.o.    Patient's PCP/Provider is:  Chase Khanna DO    Subjective:  Patient is seen today for follow-up regarding continued care regarding continued evaluation regarding mild surgical site dehiscence left foot.  Patient has noticed steady improvement since last visit.  He has not noticed as much serous drainage from the site since last week.  Patient is in no acute distress.  He denies any nausea, vomiting, fever, chills.  No other abnormalities noted at this time.    Chief Complaint   Patient presents with    Post-Op Check     Left foot wound       ROS:  Const: Positives and pertinent negatives as per HPI.    Musculo: Denies symptoms other than stated above.  Neuro: Denies symptoms other than stated above.  Skin: Denies symptoms other than stated above.    Current Medications:    Current Outpatient Medications:     alfuzosin (UROXATRAL) 10 MG extended release tablet, take 1 tablet by mouth once daily, Disp: 30 tablet, Rfl: 3    amLODIPine-benazepril (LOTREL) 10-20 MG per capsule, TAKE 1 CAPSULE DAILY (Patient taking differently: nightly TAKE 1 CAPSULE DAILY), Disp: 90 capsule, Rfl: 3    tadalafil (CIALIS) 20 MG tablet, Take 1 tablet by mouth as needed for Erectile Dysfunction, Disp: 30 tablet, Rfl: 5    valACYclovir (VALTREX) 500 MG tablet, Take 1 tablet by mouth 2 times daily, Disp: 60 tablet, Rfl: 5    EPINEPHrine (EPIPEN 2-EVERTON) 0.3 MG/0.3ML SOAJ injection, Inject 0.3 mLs into the muscle once for 1 dose Use as directed for allergic reaction, Disp: 2 each, Rfl: 5    Allergies:  Allergies   Allergen Reactions    Peanuts [Peanut Oil] Anaphylaxis     ONLY PEANUTS..... NOT PEANUT OIL        Vitals:    07/10/24 1127   Weight: 96.6 kg (213 lb)   Height: 1.829 m (6' 0.01\")       Exam:  Neurovascular status unchanged.  Surgical site improved lateral left midfoot region.  No signs of infection noted left foot.  No serous drainage noted from site left

## 2024-07-11 DIAGNOSIS — M48.061 DEGENERATIVE LUMBAR SPINAL STENOSIS: Primary | ICD-10-CM

## 2024-07-11 RX ORDER — MELOXICAM 15 MG/1
15 TABLET ORAL DAILY
Qty: 30 TABLET | Refills: 11 | Status: SHIPPED | OUTPATIENT
Start: 2024-07-11 | End: 2025-07-11

## 2024-07-24 ENCOUNTER — OFFICE VISIT (OUTPATIENT)
Dept: PODIATRY | Age: 54
End: 2024-07-24
Payer: COMMERCIAL

## 2024-07-24 VITALS — WEIGHT: 213 LBS | HEIGHT: 72 IN | BODY MASS INDEX: 28.85 KG/M2

## 2024-07-24 DIAGNOSIS — R26.2 DIFFICULTY WALKING: ICD-10-CM

## 2024-07-24 DIAGNOSIS — M25.571 PAIN IN RIGHT ANKLE AND JOINTS OF RIGHT FOOT: ICD-10-CM

## 2024-07-24 DIAGNOSIS — M79.89 MASS OF SOFT TISSUE OF FOOT: Primary | ICD-10-CM

## 2024-07-24 DIAGNOSIS — M10.9 ACUTE GOUT OF RIGHT ANKLE, UNSPECIFIED CAUSE: ICD-10-CM

## 2024-07-24 PROCEDURE — 99213 OFFICE O/P EST LOW 20 MIN: CPT | Performed by: PODIATRIST

## 2024-07-24 RX ORDER — COLCHICINE 0.6 MG/1
0.6 TABLET ORAL DAILY
Qty: 30 TABLET | Refills: 3 | Status: SHIPPED | OUTPATIENT
Start: 2024-07-24

## 2024-07-24 NOTE — PROGRESS NOTES
24     Erik Hurst    : 1970   Sex: male    Age: 53 y.o.    Patient's PCP/Provider is:  Chase Khanna DO    Subjective:  Patient is seen today for follow-up regarding continued care regarding soft tissue mass region left foot.  Patient states she is about 90% improved from the soft tissue mass procedure and treatments.  Patient is very pleased with current care provided.  Patient is having a flareup of gout into his right ankle and hindfoot region at this time.  He did want to discuss treatment options available at this time due to disability with daily activities.  Patient denies any additional issues at this time.    Chief Complaint   Patient presents with    Wound Check     Left foot        ROS:  Const: Positives and pertinent negatives as per HPI.    Musculo: Denies symptoms other than stated above.  Neuro: Denies symptoms other than stated above.  Skin: Denies symptoms other than stated above.    Current Medications:    Current Outpatient Medications:     colchicine (COLCRYS) 0.6 MG tablet, Take 1 tablet by mouth daily, Disp: 30 tablet, Rfl: 3    alfuzosin (UROXATRAL) 10 MG extended release tablet, take 1 tablet by mouth once daily, Disp: 30 tablet, Rfl: 3    amLODIPine-benazepril (LOTREL) 10-20 MG per capsule, TAKE 1 CAPSULE DAILY (Patient taking differently: nightly TAKE 1 CAPSULE DAILY), Disp: 90 capsule, Rfl: 3    tadalafil (CIALIS) 20 MG tablet, Take 1 tablet by mouth as needed for Erectile Dysfunction, Disp: 30 tablet, Rfl: 5    valACYclovir (VALTREX) 500 MG tablet, Take 1 tablet by mouth 2 times daily, Disp: 60 tablet, Rfl: 5    EPINEPHrine (EPIPEN 2-EVERTON) 0.3 MG/0.3ML SOAJ injection, Inject 0.3 mLs into the muscle once for 1 dose Use as directed for allergic reaction, Disp: 2 each, Rfl: 5    Allergies:  Allergies   Allergen Reactions    Peanuts [Peanut Oil] Anaphylaxis     ONLY PEANUTS..... NOT PEANUT OIL        Vitals:    24 1131   Weight: 96.6 kg (213 lb)   Height: 1.829 m (6'

## 2024-08-07 ENCOUNTER — OFFICE VISIT (OUTPATIENT)
Dept: PODIATRY | Age: 54
End: 2024-08-07
Payer: COMMERCIAL

## 2024-08-07 VITALS — HEIGHT: 72 IN | WEIGHT: 213 LBS | BODY MASS INDEX: 28.85 KG/M2

## 2024-08-07 DIAGNOSIS — M10.9 ACUTE GOUT OF RIGHT ANKLE, UNSPECIFIED CAUSE: ICD-10-CM

## 2024-08-07 DIAGNOSIS — M79.89 MASS OF SOFT TISSUE OF FOOT: Primary | ICD-10-CM

## 2024-08-07 PROCEDURE — 99213 OFFICE O/P EST LOW 20 MIN: CPT | Performed by: PODIATRIST

## 2024-08-07 NOTE — PROGRESS NOTES
24     Erik Hurst    : 1970   Sex: male    Age: 53 y.o.    Patient's PCP/Provider is:  Chase Khanna DO    Subjective:  Patient is seen today for follow-up regarding continued care regarding gout issues right foot/ankle and soft tissue mass issue left.  Overall patient is doing well with current oral and topical medications utilized.  Patient is back in his regular shoe gear without additional issues noted.  No other abnormalities noted at this time.  Patient is pleased with current care provided.    Chief Complaint   Patient presents with    Foot Pain     Left foot       ROS:  Const: Positives and pertinent negatives as per HPI.    Musculo: Denies symptoms other than stated above.  Neuro: Denies symptoms other than stated above.  Skin: Denies symptoms other than stated above.    Current Medications:    Current Outpatient Medications:     colchicine (COLCRYS) 0.6 MG tablet, Take 1 tablet by mouth daily, Disp: 30 tablet, Rfl: 3    alfuzosin (UROXATRAL) 10 MG extended release tablet, take 1 tablet by mouth once daily, Disp: 30 tablet, Rfl: 3    amLODIPine-benazepril (LOTREL) 10-20 MG per capsule, TAKE 1 CAPSULE DAILY (Patient taking differently: nightly TAKE 1 CAPSULE DAILY), Disp: 90 capsule, Rfl: 3    tadalafil (CIALIS) 20 MG tablet, Take 1 tablet by mouth as needed for Erectile Dysfunction, Disp: 30 tablet, Rfl: 5    valACYclovir (VALTREX) 500 MG tablet, Take 1 tablet by mouth 2 times daily, Disp: 60 tablet, Rfl: 5    EPINEPHrine (EPIPEN 2-EVERTON) 0.3 MG/0.3ML SOAJ injection, Inject 0.3 mLs into the muscle once for 1 dose Use as directed for allergic reaction, Disp: 2 each, Rfl: 5    Allergies:  Allergies   Allergen Reactions    Peanuts [Peanut Oil] Anaphylaxis     ONLY PEANUTS..... NOT PEANUT OIL        Vitals:    24 1114   Weight: 96.6 kg (213 lb)   Height: 1.829 m (6' 0.01\")       Exam:  Neurovascular status unchanged.  Soft tissue mass healed left lateral midfoot region.  Gout issues

## 2024-08-07 NOTE — PROGRESS NOTES
Patient is in today for follow up of left foot. Patient says the foot does not hurt any more. Pcp is Chase Khanna,   Last ov 6/5/24

## 2024-08-09 ENCOUNTER — HOSPITAL ENCOUNTER (OUTPATIENT)
Dept: RADIOLOGY | Facility: HOSPITAL | Age: 54
Discharge: HOME | End: 2024-08-09
Payer: COMMERCIAL

## 2024-08-09 DIAGNOSIS — M54.50 LUMBAR PAIN: ICD-10-CM

## 2024-08-09 PROCEDURE — 72131 CT LUMBAR SPINE W/O DYE: CPT

## 2024-08-14 ENCOUNTER — DOCUMENTATION (OUTPATIENT)
Dept: ORTHOPEDICS | Facility: HOSPITAL | Age: 54
End: 2024-08-14
Payer: COMMERCIAL

## 2024-08-14 NOTE — PROGRESS NOTES
I spoke with Francisco.  His CT scan shows good position of his instrumentation and he does have a solid posterolateral fusion mass.    He is having ongoing mechanical type back pain and perhaps some of this is related to the sacroiliac joint.    I think he would benefit from corticosteroid injections.  Will ask him to see our pain management colleagues and he will keep me updated on his progress.    ** Dictated with voice recognition software and not immediately reviewed for errors in grammar and/or spelling **

## 2024-08-15 DIAGNOSIS — M54.50 LUMBAR PAIN: Primary | ICD-10-CM

## 2024-09-26 ENCOUNTER — OFFICE VISIT (OUTPATIENT)
Dept: PAIN MEDICINE | Facility: HOSPITAL | Age: 54
End: 2024-09-26
Payer: COMMERCIAL

## 2024-09-26 VITALS — HEIGHT: 72 IN | BODY MASS INDEX: 29.12 KG/M2 | WEIGHT: 215 LBS

## 2024-09-26 DIAGNOSIS — M54.16 LUMBAR RADICULOPATHY: ICD-10-CM

## 2024-09-26 DIAGNOSIS — M54.50 LUMBAR PAIN: ICD-10-CM

## 2024-09-26 DIAGNOSIS — M96.1 FAILED BACK SURGICAL SYNDROME: Primary | ICD-10-CM

## 2024-09-26 DIAGNOSIS — M54.16 RIGHT LUMBAR RADICULITIS: ICD-10-CM

## 2024-09-26 PROCEDURE — 3008F BODY MASS INDEX DOCD: CPT | Performed by: ANESTHESIOLOGY

## 2024-09-26 PROCEDURE — 99214 OFFICE O/P EST MOD 30 MIN: CPT | Performed by: ANESTHESIOLOGY

## 2024-09-26 PROCEDURE — 99204 OFFICE O/P NEW MOD 45 MIN: CPT | Performed by: ANESTHESIOLOGY

## 2024-09-26 RX ORDER — GABAPENTIN 300 MG/1
CAPSULE ORAL
Qty: 180 CAPSULE | Refills: 0 | Status: SHIPPED | OUTPATIENT
Start: 2024-09-26 | End: 2024-09-29

## 2024-09-26 ASSESSMENT — PAIN SCALES - GENERAL: PAINLEVEL: 7

## 2024-09-29 NOTE — PROGRESS NOTES
Chief Complaint   Patient presents with    Back Pain        HPI   Mr. Szymanski is a 53-year-old male with a history of back pain.  The patient did have a CT scan of lumbar spine and was recently seen by Dr. Moreira with orthopedic spine surgery who referred him for further evaluation.  The patient says that in 2010 he underwent a spinal fusion and did really well.  He was doing extremely well until 2019 when he fell off a deck.  He had fracture for vertebral bodies and ultimately needed further spine surgery above which was done in January 2023.  The patient says that he has troubles with pain that is more focal and located in the right low back.  He only has a pain with standing and it completely goes away with sitting and lying down.  The pain is sharp in nature and severe when it occurs.  It can be an 8 out of 10 and is disabling at times.  He is still able to workout using a recumbent bike and notes that he has not had any type of interventional procedure since his surgery.  He has tried medications including ibuprofen, indomethacin, meloxicam was recently prescribed but he does not see any efficacy with that.  He has never tried a nerve medication.  He has done prior formal physical therapy and keeps up with physician directed exercises and stretches at least 3-4 times a week and has done so over the past 6 months or more.    ROS: 13 point review of systems is complete and is negative listed above in HPI    No past medical history on file.    No past surgical history on file.    No family history on file.    Social History     Tobacco Use    Smoking status: Former     Types: Cigarettes       Current Outpatient Medications on File Prior to Visit   Medication Sig Dispense Refill    meloxicam (Mobic) 15 mg tablet Take 1 tablet (15 mg) by mouth once daily. 30 tablet 11     No current facility-administered medications on file prior to visit.        Allergies   Allergen Reactions    Peanut Anaphylaxis           Imaging:  Narrative & Impression   Interpreted By:  Abdias Hendricks,   STUDY:  CT LUMBAR SPINE WO IV CONTRAST  8/9/2024 12:16 pm      INDICATION:  Signs/Symptoms:PAIN      COMPARISON:  MRI dated 07/23/2019      ACCESSION NUMBER(S):  [id number]      ORDERING CLINICIAN:  MICAH WILLIAM      TECHNIQUE:  Cut axial CT images through the lumbar spine were obtained and  reconstructed in coronal and sagittal planes.      FINDINGS:  There are postoperative changes compatible with a previous L3  laminectomy. There is metallic artifact from posterior-lateral  orthopedic fixation hardware and pedicle screws extending from the L2  through S1 levels. The orthopedic hardware appears intact without  evidence of loosening.      There is chronic collapse and anterior wedging of the L2 vertebral  body with bone cement within the collapsed L2 vertebral body as well  as along the L2/3 disc space compatible with a previous  vertebroplasty. There is mild retropulsion of the posterior margin of  the collapsed L2 vertebral body contributing to mild bony  encroachment upon the spinal canal.      The coronal reconstructed images demonstrate a levocurvature of the  lumbar spine.      There is 15 mm of anterolisthesis of L5 on S1. There is 6 mm of  retrolisthesis of L2 on L3 and 4 mm of retrolisthesis of L3 on L4.      There is multilevel spondylosis. The soft tissues of the spinal canal  and neural foramen are suboptimally evaluated on this CT study.      At the L5/S1 level, there is 15 mm of anterolisthesis of L5 on S1.  There is no significant bony encroachment upon the spinal canal.  There is severe bony encroachment upon the left neural foramen and  moderate bony encroachment upon the right neural foramen.      At the L4/5 level, there are hypertrophic degenerative facet changes  and a minimal posterior disc bulge without significant spinal canal  narrowing. There is no significant neural foraminal narrowing.      At the L3/4  level, there are postoperative changes compatible with a  previous L3 laminectomy. There is 4 mm of retrolisthesis of L3 on L4  along with hypertrophic degenerative facet changes. There is no  significant bony encroachment upon the spinal canal. There is  moderate left and mild right-sided neural foraminal narrowing.      At the L2/3 level, there is a component of retropulsion of the  posterior/inferior margin of the collapsed L2 vertebral body with a  proximally 6 mm of retrolisthesis of L2 on L3 which along with  hypertrophic degenerative facet changes contributing to mild bony  encroachment upon the spinal canal. There is moderate bony  encroachment upon the neural foramen bilaterally. The soft tissues of  the spinal canal and neural foramen are suboptimally evaluated on  this CT study.      At the L1/2 level, there is retropulsion of the superior/posterior  margin of the collapsed L2 vertebral body contributing to mild bony  encroachment upon the spinal canal. There are degenerative facet  changes. There is moderate right-sided neural foraminal narrowing  there is no significant bony encroachment upon the left neural  foramen.      At the T12/L1 level, there is no significant bony encroachment upon  the spinal canal or neural foramen.      At the T11/12 level, there is no significant bony encroachment upon  the spinal canal or neural foramen.      Atherosclerotic calcifications noted along the descending aorta and  iliac arteries.      IMPRESSION:  There are postoperative changes compatible with a previous L3  laminectomy. There is metallic artifact from posterior-lateral  orthopedic fixation hardware and pedicle screws extending from the L2  through S1 levels. The orthopedic hardware appears intact without  evidence of loosening.      There is chronic collapse and anterior wedging of the L2 vertebral  body with bone cement within the collapsed L2 vertebral body as well  as along the L2/3 disc space compatible  with a previous  vertebroplasty. There is mild retropulsion of the posterior margin of  the collapsed L2 vertebral body contributing to mild bony  encroachment upon the spinal canal.      The coronal reconstructed images demonstrate a levocurvature of the  lumbar spine.      There is 15 mm of anterolisthesis of L5 on S1. There is 6 mm of  retrolisthesis of L2 on L3 and 4 mm of retrolisthesis of L3 on L4.      There is multilevel spondylosis with varying degrees of spinal canal  and neural foraminal narrowing as described above. The soft tissues  of the spinal canal and neural foramen are suboptimally evaluated on  this CT study.       Physical Exam:  Gen.: Patient appears to be stated age, fair hygiene  Eyes: Pupils are symmetric, conjunctiva is nonicteric and lids without obvious drooping or rash  ENT: Hearing is grossly intact, external ears and nose appear to be without deformity or rash. No lesions or masses noted.  Neck: No JVD noted, tracheal position is midline  Respiratory: No gasping or shortness of breath noted, no use of accessory muscles noted  Cardiovascular: Extremity show no edema or varicosities  Lymph: No lymphadenopathy noted in the anterior cervical regions bilaterally  Skin no rashes or open lesions or ulcers identified on the skin  Musculoskeletal: Gait is grossly normal, negative [place], negative compression test.  Negative SLR bilaterally.  Unable to reproduce pain through extension maneuvers or palpation of the spine.  Neurologic: Cranial nerves II through XII are grossly intact  Psychiatric:  Patient is alert and oriented x3    Impression/Plan:  53-year-old male with a history of right sided low back pain which is rather focal and he does have pain that is most consistent with a recurrent stenosis type pain.  There was a question as to whether this could be from his SI joint however provocative maneuvers do not reproduce pain.    -Will plan for caudal epidural.  Procedure, risk, benefits,  alternatives reviewed.    -Will plan for gabapentin titration.  Side effect profile and risk reviewed.  Written instructions given to the patient explained the office today, if we stop it later would wean it down slowly rather than stop abruptly.    -Discussed with patient that if he does not see relief with anti-inflammatories would discontinue it as there is more harm than benefit as he is taking it without relief.    -We discussed the potential for neuromodulation if we do not see relief with the aforementioned measures, may consider MRI in the future as well.

## 2024-10-11 DIAGNOSIS — R79.89 ABNORMAL LFTS: ICD-10-CM

## 2024-10-11 LAB
ALBUMIN: 4.7 G/DL (ref 3.5–5.2)
ALP BLD-CCNC: 47 U/L (ref 40–129)
ALT SERPL-CCNC: 42 U/L (ref 0–40)
ANION GAP SERPL CALCULATED.3IONS-SCNC: 18 MMOL/L (ref 7–16)
AST SERPL-CCNC: 36 U/L (ref 0–39)
BASOPHILS ABSOLUTE: 0.07 K/UL (ref 0–0.2)
BASOPHILS RELATIVE PERCENT: 1 % (ref 0–2)
BILIRUB SERPL-MCNC: 0.6 MG/DL (ref 0–1.2)
BUN BLDV-MCNC: 12 MG/DL (ref 6–20)
CALCIUM SERPL-MCNC: 9.6 MG/DL (ref 8.6–10.2)
CHLORIDE BLD-SCNC: 102 MMOL/L (ref 98–107)
CO2: 20 MMOL/L (ref 22–29)
CREAT SERPL-MCNC: 0.9 MG/DL (ref 0.7–1.2)
EOSINOPHILS ABSOLUTE: 0.25 K/UL (ref 0.05–0.5)
EOSINOPHILS RELATIVE PERCENT: 4 % (ref 0–6)
FOLATE: 15.5 NG/ML (ref 4.8–24.2)
GFR, ESTIMATED: >90 ML/MIN/1.73M2
GGT, 20027: 48 U/L (ref 10–71)
GLUCOSE BLD-MCNC: 95 MG/DL (ref 74–99)
HCT VFR BLD CALC: 42.5 % (ref 37–54)
HEMOGLOBIN: 14 G/DL (ref 12.5–16.5)
IMMATURE GRANULOCYTES %: 1 % (ref 0–5)
IMMATURE GRANULOCYTES ABSOLUTE: 0.03 K/UL (ref 0–0.58)
LYMPHOCYTES ABSOLUTE: 1.35 K/UL (ref 1.5–4)
LYMPHOCYTES RELATIVE PERCENT: 22 % (ref 20–42)
MCH RBC QN AUTO: 33.1 PG (ref 26–35)
MCHC RBC AUTO-ENTMCNC: 32.9 G/DL (ref 32–34.5)
MCV RBC AUTO: 100.5 FL (ref 80–99.9)
MONOCYTES ABSOLUTE: 0.61 K/UL (ref 0.1–0.95)
MONOCYTES RELATIVE PERCENT: 10 % (ref 2–12)
NEUTROPHILS ABSOLUTE: 3.79 K/UL (ref 1.8–7.3)
NEUTROPHILS RELATIVE PERCENT: 62 % (ref 43–80)
PDW BLD-RTO: 13.1 % (ref 11.5–15)
PLATELET # BLD: 335 K/UL (ref 130–450)
PMV BLD AUTO: 9.5 FL (ref 7–12)
POTASSIUM SERPL-SCNC: 4.9 MMOL/L (ref 3.5–5)
RBC # BLD: 4.23 M/UL (ref 3.8–5.8)
SODIUM BLD-SCNC: 140 MMOL/L (ref 132–146)
TOTAL PROTEIN: 7.4 G/DL (ref 6.4–8.3)
VITAMIN B-12: 601 PG/ML (ref 211–946)
WBC # BLD: 6.1 K/UL (ref 4.5–11.5)

## 2024-10-16 ENCOUNTER — OFFICE VISIT (OUTPATIENT)
Dept: FAMILY MEDICINE CLINIC | Age: 54
End: 2024-10-16

## 2024-10-16 VITALS
HEIGHT: 72 IN | WEIGHT: 219 LBS | HEART RATE: 80 BPM | RESPIRATION RATE: 18 BRPM | SYSTOLIC BLOOD PRESSURE: 128 MMHG | OXYGEN SATURATION: 98 % | BODY MASS INDEX: 29.66 KG/M2 | DIASTOLIC BLOOD PRESSURE: 80 MMHG

## 2024-10-16 DIAGNOSIS — R53.82 CHRONIC FATIGUE: ICD-10-CM

## 2024-10-16 DIAGNOSIS — L30.8 OTHER ECZEMA: ICD-10-CM

## 2024-10-16 DIAGNOSIS — R16.0 HEPATOMEGALY: ICD-10-CM

## 2024-10-16 DIAGNOSIS — G56.02 CARPAL TUNNEL SYNDROME OF LEFT WRIST: ICD-10-CM

## 2024-10-16 DIAGNOSIS — Z91.010 PEANUT ALLERGY: ICD-10-CM

## 2024-10-16 DIAGNOSIS — N40.1 BENIGN PROSTATIC HYPERPLASIA WITH INCOMPLETE BLADDER EMPTYING: ICD-10-CM

## 2024-10-16 DIAGNOSIS — Z23 NEED FOR INFLUENZA VACCINATION: ICD-10-CM

## 2024-10-16 DIAGNOSIS — S22.000A CLOSED COMPRESSION FRACTURE OF THORACIC VERTEBRA, INITIAL ENCOUNTER (HCC): ICD-10-CM

## 2024-10-16 DIAGNOSIS — E78.5 HYPERLIPIDEMIA, UNSPECIFIED HYPERLIPIDEMIA TYPE: ICD-10-CM

## 2024-10-16 DIAGNOSIS — R39.14 BENIGN PROSTATIC HYPERPLASIA WITH INCOMPLETE BLADDER EMPTYING: ICD-10-CM

## 2024-10-16 DIAGNOSIS — M51.16 LUMBAR DISC DISEASE WITH RADICULOPATHY: Primary | ICD-10-CM

## 2024-10-16 DIAGNOSIS — K76.0 FATTY LIVER: ICD-10-CM

## 2024-10-16 DIAGNOSIS — S32.010A CLOSED COMPRESSION FRACTURE OF L1 VERTEBRA, INITIAL ENCOUNTER (HCC): ICD-10-CM

## 2024-10-16 DIAGNOSIS — F34.1 DYSTHYMIA: ICD-10-CM

## 2024-10-16 DIAGNOSIS — Z00.00 ANNUAL PHYSICAL EXAM: ICD-10-CM

## 2024-10-16 DIAGNOSIS — F51.01 PRIMARY INSOMNIA: ICD-10-CM

## 2024-10-16 RX ORDER — ETODOLAC 300 MG
CAPSULE ORAL
COMMUNITY
Start: 2024-09-03

## 2024-10-16 RX ORDER — ALFUZOSIN HYDROCHLORIDE 10 MG/1
10 TABLET, EXTENDED RELEASE ORAL DAILY
Qty: 90 TABLET | Refills: 3 | Status: SHIPPED | OUTPATIENT
Start: 2024-10-16

## 2024-10-16 RX ORDER — ACETAMINOPHEN AND CODEINE PHOSPHATE 300; 30 MG/1; MG/1
TABLET ORAL
COMMUNITY
Start: 2024-09-05 | End: 2024-10-18

## 2024-10-16 RX ORDER — DULOXETIN HYDROCHLORIDE 30 MG/1
30 CAPSULE, DELAYED RELEASE ORAL DAILY
Qty: 90 CAPSULE | Refills: 4 | Status: SHIPPED | OUTPATIENT
Start: 2024-10-16

## 2024-10-16 RX ORDER — OXYCODONE AND ACETAMINOPHEN 10; 325 MG/1; MG/1
1 TABLET ORAL EVERY 8 HOURS PRN
Qty: 90 TABLET | Refills: 0 | Status: SHIPPED | OUTPATIENT
Start: 2024-10-16 | End: 2025-01-14

## 2024-10-16 RX ORDER — GABAPENTIN 300 MG/1
CAPSULE ORAL
COMMUNITY
Start: 2024-09-26

## 2024-10-16 RX ORDER — AMLODIPINE AND BENAZEPRIL HYDROCHLORIDE 10; 20 MG/1; MG/1
CAPSULE ORAL
Qty: 90 CAPSULE | Refills: 3 | Status: SHIPPED | OUTPATIENT
Start: 2024-10-16

## 2024-10-16 SDOH — ECONOMIC STABILITY: FOOD INSECURITY: WITHIN THE PAST 12 MONTHS, THE FOOD YOU BOUGHT JUST DIDN'T LAST AND YOU DIDN'T HAVE MONEY TO GET MORE.: PATIENT DECLINED

## 2024-10-16 SDOH — ECONOMIC STABILITY: INCOME INSECURITY: HOW HARD IS IT FOR YOU TO PAY FOR THE VERY BASICS LIKE FOOD, HOUSING, MEDICAL CARE, AND HEATING?: PATIENT DECLINED

## 2024-10-16 SDOH — ECONOMIC STABILITY: FOOD INSECURITY: WITHIN THE PAST 12 MONTHS, YOU WORRIED THAT YOUR FOOD WOULD RUN OUT BEFORE YOU GOT MONEY TO BUY MORE.: PATIENT DECLINED

## 2024-10-16 NOTE — PROGRESS NOTES
wall: No tenderness.   Abdominal:      General: Bowel sounds are normal. There is no distension.      Palpations: Abdomen is soft. There is no mass.      Tenderness: There is no abdominal tenderness. There is no guarding or rebound.   Genitourinary:     Comments: Deferred by patient   Musculoskeletal:      Cervical back: Normal range of motion and neck supple.      Comments: Increased spasm L1 to S1 with decreased ROM. + straight leg raising and contralateral straight leg raising tests B/L      Lymphadenopathy:      Cervical: No cervical adenopathy.   Skin:     General: Skin is warm and dry.      Coloration: Skin is not pale.      Findings: Rash present. No erythema.   Neurological:      Mental Status: He is alert and oriented to person, place, and time.      Cranial Nerves: No cranial nerve deficit.      Motor: No abnormal muscle tone.      Coordination: Coordination normal.      Deep Tendon Reflexes: Reflexes are normal and symmetric. Reflexes normal.   Psychiatric:         Behavior: Behavior normal.         Thought Content: Thought content normal.         Judgment: Judgment normal.         Assessment / Plan:   Erik \"Lico\" was seen today for 3 month follow-up.    Diagnoses and all orders for this visit:    Lumbar disc disease with radiculopathy  -     External Referral To Neurosurgery  -     oxyCODONE-acetaminophen (PERCOCET)  MG per tablet; Take 1 tablet by mouth every 8 hours as needed for Pain for up to 90 days. Max Daily Amount: 3 tablets    Benign prostatic hyperplasia with incomplete bladder emptying  -     alfuzosin (UROXATRAL) 10 MG extended release tablet; Take 1 tablet by mouth daily    Closed compression fracture of thoracic vertebra, initial encounter (Formerly [facility])  -     oxyCODONE-acetaminophen (PERCOCET)  MG per tablet; Take 1 tablet by mouth every 8 hours as needed for Pain for up to 90 days. Max Daily Amount: 3 tablets    Closed compression fracture of L1 vertebra, initial encounter (Formerly [facility])  -

## 2024-10-17 DIAGNOSIS — F51.01 PRIMARY INSOMNIA: ICD-10-CM

## 2024-10-17 RX ORDER — ZALEPLON 10 MG/1
10 CAPSULE ORAL NIGHTLY
Qty: 30 CAPSULE | Refills: 3 | Status: SHIPPED | OUTPATIENT
Start: 2024-10-17 | End: 2025-10-17

## 2024-10-18 ENCOUNTER — HOSPITAL ENCOUNTER (OUTPATIENT)
Facility: HOSPITAL | Age: 54
Discharge: HOME | End: 2024-10-18
Payer: COMMERCIAL

## 2024-10-18 VITALS
WEIGHT: 215 LBS | BODY MASS INDEX: 29.12 KG/M2 | RESPIRATION RATE: 18 BRPM | TEMPERATURE: 97.9 F | SYSTOLIC BLOOD PRESSURE: 132 MMHG | DIASTOLIC BLOOD PRESSURE: 92 MMHG | HEIGHT: 72 IN | HEART RATE: 70 BPM | OXYGEN SATURATION: 99 %

## 2024-10-18 DIAGNOSIS — M54.16 LUMBAR RADICULOPATHY: ICD-10-CM

## 2024-10-18 PROCEDURE — 62323 NJX INTERLAMINAR LMBR/SAC: CPT | Performed by: ANESTHESIOLOGY

## 2024-10-18 PROCEDURE — 2550000001 HC RX 255 CONTRASTS: Performed by: ANESTHESIOLOGY

## 2024-10-18 PROCEDURE — 2500000004 HC RX 250 GENERAL PHARMACY W/ HCPCS (ALT 636 FOR OP/ED): Performed by: ANESTHESIOLOGY

## 2024-10-18 RX ORDER — ALFUZOSIN HYDROCHLORIDE 10 MG/1
1 TABLET, EXTENDED RELEASE ORAL
COMMUNITY
Start: 2024-05-28

## 2024-10-18 RX ORDER — METHYLPREDNISOLONE ACETATE 40 MG/ML
INJECTION, SUSPENSION INTRA-ARTICULAR; INTRALESIONAL; INTRAMUSCULAR; SOFT TISSUE
Status: COMPLETED | OUTPATIENT
Start: 2024-10-18 | End: 2024-10-18

## 2024-10-18 RX ORDER — AMLODIPINE AND BENAZEPRIL HYDROCHLORIDE 10; 20 MG/1; MG/1
1 CAPSULE ORAL DAILY
COMMUNITY
Start: 2024-10-16

## 2024-10-18 RX ORDER — LIDOCAINE HYDROCHLORIDE 5 MG/ML
INJECTION, SOLUTION INFILTRATION; INTRAVENOUS
Status: COMPLETED | OUTPATIENT
Start: 2024-10-18 | End: 2024-10-18

## 2024-10-18 RX ORDER — LIDOCAINE HYDROCHLORIDE 5 MG/ML
INJECTION, SOLUTION INFILTRATION; INTRAVENOUS
Status: DISPENSED
Start: 2024-10-18 | End: 2024-10-18

## 2024-10-18 RX ORDER — METHYLPREDNISOLONE ACETATE 40 MG/ML
INJECTION, SUSPENSION INTRA-ARTICULAR; INTRALESIONAL; INTRAMUSCULAR; SOFT TISSUE
Status: DISPENSED
Start: 2024-10-18 | End: 2024-10-18

## 2024-10-18 ASSESSMENT — ENCOUNTER SYMPTOMS
DEPRESSION: 0
OCCASIONAL FEELINGS OF UNSTEADINESS: 0
EYE REDNESS: 0
PHOTOPHOBIA: 0
FACIAL SWELLING: 0
APNEA: 0
BACK PAIN: 1
VOICE CHANGE: 0
BLOOD IN STOOL: 0
ABDOMINAL PAIN: 0
EYE DISCHARGE: 0
RHINORRHEA: 1
EYE ITCHING: 0
TROUBLE SWALLOWING: 0
STRIDOR: 0
SINUS PRESSURE: 0
SORE THROAT: 0
EYE PAIN: 0
ABDOMINAL DISTENTION: 0
COUGH: 0
COLOR CHANGE: 0
CHEST TIGHTNESS: 0
CHOKING: 0
LOSS OF SENSATION IN FEET: 0
RECTAL PAIN: 0
ANAL BLEEDING: 0
NAUSEA: 0
WHEEZING: 0
VOMITING: 0
CONSTIPATION: 0
DIARRHEA: 0
SHORTNESS OF BREATH: 0

## 2024-10-18 ASSESSMENT — PAIN - FUNCTIONAL ASSESSMENT
PAIN_FUNCTIONAL_ASSESSMENT: 0-10

## 2024-10-18 ASSESSMENT — PAIN SCALES - GENERAL
PAINLEVEL_OUTOF10: 0 - NO PAIN
PAINLEVEL_OUTOF10: 2
PAINLEVEL_OUTOF10: 2

## 2024-10-18 ASSESSMENT — COLUMBIA-SUICIDE SEVERITY RATING SCALE - C-SSRS
6. HAVE YOU EVER DONE ANYTHING, STARTED TO DO ANYTHING, OR PREPARED TO DO ANYTHING TO END YOUR LIFE?: NO
2. HAVE YOU ACTUALLY HAD ANY THOUGHTS OF KILLING YOURSELF?: NO
1. IN THE PAST MONTH, HAVE YOU WISHED YOU WERE DEAD OR WISHED YOU COULD GO TO SLEEP AND NOT WAKE UP?: NO

## 2024-10-18 ASSESSMENT — PAIN DESCRIPTION - DESCRIPTORS: DESCRIPTORS: ACHING

## 2024-10-18 NOTE — DISCHARGE INSTRUCTIONS
DISCHARGE INSTRUCTIONS FOR INJECTIONS     You underwent caudal epidural steroid injection today.    After most injections, it is recommended that you relax and limit your activity for the remainder of the day unless you have been told otherwise by your pain physician.  You should not drive a car, operate machinery, or make important legal decisions unless otherwise directed by your pain physician.  You may resume your normal activity, including exercise, tomorrow.      Keep a written pain diary of how much pain relief you experienced following the injection procedure and the length of time of pain relief you experienced pain relief. Following diagnostic injections like medial branch nerve blocks, sacroiliac joint blocks, stellate ganglion injections and other blocks, it is very important you record the specific amount of pain relief you experienced immediately after the injectionand how long it lasted. Your doctor will ask you for this information at your follow up visit.     For all injections, please keep the injection site dry and inspect the site for a couple of days. You may remove the Band-Aid the day of the injection at any time.     Some discomfort, bruising or slight swelling may occur at the injection site. This is not abnormal if it occurs.  If needed you may:    -Take over the counter medication such as Tylenol or Motrin.   -Apply an ice pack for 30 minutes, 2 to 3 times a day for the first 24 hours.     You may shower today; no soaking baths, hot tubs, whirlpools or swimming pools for two days.      If you are given steroids in your injection, it may take 3-5 days for the steroid medication to take effect. You may notice a worsening of your symptoms for 1-2 days after the injection. This is not abnormal.  You may use acetaminophen, ibuprofen, or prescription medication that your doctor may have prescribed for you if you need to do so.     A few common side effects of steroids include facial flushing,  sweating, restlessness, irritability,difficulty sleeping, increase in blood sugar, and increased blood pressure. If you have diabetes, please monitor your blood sugar at least once a day for at least 5 days. If you have poorly controlled high blood pressure, monitoryour blood pressure for at least 2 days and contact your primary care physician if these numbers are unusually high for you.      If you take aspirin or non-steroidal anti-inflammatory drugs (examples are Motrin, Advil, ibuprofen, Naprosyn, Voltaren, Relafen, etc.) you may restart these this evening, but stop taking it 3 days before your next appointment, unless instructed otherwiseby your physician.      You do not need to discontinue non-aspirin-containing pain medications prior to an injection (examples: Celebrex, tramadol, hydrocodone and acetaminophen).      If you take a blood thinning medication (Coumadin, Lovenox, Fragmin,Ticlid, Plavix, Pradaxa, etc.), please discuss this with your primary care physician/cardiologist and your pain physician. These medications MUST be discontinued before you can have an injection safely, without the risk of uncontrolled bleeding. If these medications are not discontinued for an appropriate period of time, you will not be able to receivean injection. Please adhere to instructions given to you about when to restart your blood thinning medication. If you have any questions please reach out to our team.    If you are taking Coumadin, please have your INR checked the morning of your procedure and bring the result to your appointment unless otherwise instructed. If your INR is over 1.2, your injection may need to be rescheduled to avoid uncontrolled bleeding from the needle placement.     Call [facility]  and ask for Pain Management at [phone] between 8am-4pm Monday - Friday if you are experiencing the following:    If you received an epidural or spinal injection:    -Headache that doesnot go away with medicine, is  worse when sitting or standing up, and is greatly relieved upon lying down.   -Severe pain worse than or different than your baseline pain.   -Chills or fever (101º F or greater).   -Drainage or signs of infection at the injection site     Go directly to the Emergency Department if you are experiencing the following and received an epidural or spinal injection:   -Abrupt weakness or progressive weakness in your legs that starts after you leave the clinic.   -Abrupt severe or worsening numbness in your legs.   -Inability to urinate after the injection or loss of bowel or bladder control without the urge to defecate or urinate.     If you have a clinical question that cannot wait until your next appointment, please call [phone] between 8am-4pm Monday - Friday or send a [employer] message. We do our best to return all non-emergency messages within 24 hours, Monday - Friday. A nurse or physician will return your message. You may also the appropriate nurse below, and they will do their best to answer your questions.  - For Dr. Luke, call Vida at [phone]  - For Dr. Reddy, call Elizabeth at [phone]  - For Dr. Wilkins, call Elizabeth at [phone]  - For Dr. Lord, call Summer at [phone]  - For Dr. Singh, call Summer at [phone]    If you need to cancel an appointment, please call the scheduling staff at [phone] during normal business hours or leave a message at least 24 hours in advance.     If you are going to be sedated for your next procedure, you MUST have responsible adult who can legally drive accompany you home. You cannot eat or drink for at least eight hours prior to the planned procedure if you are going to receive sedation. You may take your non-blood thinning medications with a small sip of water.

## 2024-10-18 NOTE — H&P
HISTORY AND PHYSICAL    History Of Present Illness  Oscar Szymanski is a 53 y.o. male presenting with chronic pain here for procedure as stated below. Patient denies any changes to health since the last visit to our clinic.    Past Medical History  No past medical history on file.    Surgical History  No past surgical history on file.     Social History  He reports that he has quit smoking. His smoking use included cigarettes. He does not have any smokeless tobacco history on file. No history on file for alcohol use and drug use.    Family History  No family history on file.     Allergies  Peanut    Review of Systems  12 point ROS done and negative except for the above.     Physical Exam  General: NAD, well groomed, well nourished  Eyes: Non-icteric sclera, EOMI  Ears, Nose, Mouth, and Throat: External ears and nose appear to be without deformity or rash. No lesions or masses noted. Hearing is grossly intact.   Neck: Trachea midline  Respiratory: Nonlabored breathing   Cardiovascular: No peripheral edema   Skin: No rashes or open lesions/ulcers identified on skin.      Last Recorded Vitals  There were no vitals taken for this visit.    Relevant Results  Current Outpatient Medications   Medication Instructions    gabapentin (Neurontin) 300 mg capsule Take 1 capsule (300 mg) by mouth once daily at bedtime for 1 day, THEN 1 capsule (300 mg) 2 times a day for 1 day, THEN 1 capsule (300 mg) 3 times a day for 1 day. Thereafter, increase by one capsule every third day until taking two capsules three times a day.    meloxicam (MOBIC) 15 mg, oral, Daily       No results found for this or any previous visit from the past 1000 days.     No image results found.     1. Lumbar radiculopathy  FL pain management    FL pain management    Epidural Steroid Injection    Epidural Steroid Injection              Assessment/Plan   Oscar Szymanski is a 53 y.o. male presenting with chronic pain here for Caudal epidural steroid injection;  he denies any recent antibiotic use or infections, he denies any blood thinner use , and he denies contrast or local anesthetic allergies.    ASA PS Classification: 3  Mallampati score: 2    Plan:  - We will proceed with the procedure as above. We discussed extensively the risks, benefits, and alternatives to the procedure. The patient's questions were addressed and answered in detail. The patient demonstrated understanding of the procedure, and is amenable to proceeding with it. The Risks of the procedure that were discussed with the patient include but are not limited to the following: A lack of efficacy, transient worsening of pain, bleeding, infection, nerve injury, nerve damage, neuritis or sunburn sensation. Informed consent obtained as attached to EMR.  - Patient will follow-up with us in clinic.  - Patient to continue physician directed home exercises as tolerated.      John Dooley, PGY-2

## 2024-10-21 ENCOUNTER — TELEPHONE (OUTPATIENT)
Dept: HEMATOLOGY | Age: 54
End: 2024-10-21

## 2024-10-21 NOTE — TELEPHONE ENCOUNTER
The patient is scheduled for his new patient appt on 11/04/2024 at 10:30 am in [place]. Directions to the office were given to the patient at the time of our call. The patient was instructed to bring his photo id and insurance card. The patient confirmed all of the above and at this time all questions were answered  Electronically signed by Nae Alatorre MA on 10/21/2024 at 10:39 AM

## 2024-10-23 ENCOUNTER — OFFICE VISIT (OUTPATIENT)
Dept: ORTHOPEDIC SURGERY | Age: 54
End: 2024-10-23

## 2024-10-23 VITALS — HEIGHT: 72 IN | BODY MASS INDEX: 29.66 KG/M2 | WEIGHT: 219 LBS

## 2024-10-23 DIAGNOSIS — G56.02 LEFT CARPAL TUNNEL SYNDROME: Primary | ICD-10-CM

## 2024-10-23 RX ORDER — TRIAMCINOLONE ACETONIDE 40 MG/ML
40 INJECTION, SUSPENSION INTRA-ARTICULAR; INTRAMUSCULAR ONCE
Status: COMPLETED | OUTPATIENT
Start: 2024-10-23 | End: 2024-10-23

## 2024-10-23 RX ADMIN — TRIAMCINOLONE ACETONIDE 40 MG: 40 INJECTION, SUSPENSION INTRA-ARTICULAR; INTRAMUSCULAR at 11:39

## 2024-10-23 ASSESSMENT — ENCOUNTER SYMPTOMS
SHORTNESS OF BREATH: 0
ALLERGIC/IMMUNOLOGIC NEGATIVE: 1
ABDOMINAL DISTENTION: 0
EYE DISCHARGE: 0

## 2024-10-23 NOTE — PROGRESS NOTES
Erik Hurst (:  1970) is a 53 y.o. male,Established patient, here for evaluation of the following chief complaint(s):  Hand Pain (Left Hand, x year numbness increasing, states of first three fingers are numb and tingling.  No previous EMG done. )      Assessment & Plan   ASSESSMENT/PLAN:  1. Left carpal tunnel syndrome  -     triamcinolone acetonide (KENALOG-40) injection 40 mg; 40 mg, IntraMUSCular, ONCE, 1 dose, On Wed 10/23/24 at 1200    This is a 53 y.o. year old male with Left carpal tunnel syndrome [G56.02].  I discussed a variety of treatment options with the patient today including observation, NSAID, low impact exercise, weight loss, physical therapy and injections. I also discussed the risks, benefits, alternatives and subsequent rehab with surgery. The patient would like to proceed with injection.    We discussed the risks and benefits of a corticosteroid injection in the left carpal tunnel. The patient would like to proceed and consents to the procedure. The patient should let us know if they develop any signs of infection, worsening pain or other problems. I discussed if they have diabetes that they should closely monitor their blood glucose level over the next few days.    The left carpal tunnel was identified and prepped sterilely. Using 1 cc of 40 mg Kenalog and 1 cc of 0.25% bupivacaine the left carpal tunnel was injected without issue. The patient tolerated this procedure well and a Band-Aid was placed.        Return if symptoms worsen or fail to improve.         Subjective   SUBJECTIVE/OBJECTIVE:  Hand Pain   Pertinent negatives include no chest pain.       53-year-old male here today to discuss his left hand.  This is been increasing and numbness for the last several years.  States the first 3 fingers are numb and tingly.  It is worse in the morning.  Denies having previous EMG.  Denies having previous treatment.    Past Medical History:   Diagnosis Date    Anxiety     Depression

## 2024-11-04 ENCOUNTER — OFFICE VISIT (OUTPATIENT)
Dept: SURGERY | Age: 54
End: 2024-11-04
Payer: COMMERCIAL

## 2024-11-04 VITALS
BODY MASS INDEX: 29.53 KG/M2 | WEIGHT: 218 LBS | HEIGHT: 72 IN | TEMPERATURE: 98.6 F | DIASTOLIC BLOOD PRESSURE: 91 MMHG | SYSTOLIC BLOOD PRESSURE: 152 MMHG | HEART RATE: 78 BPM

## 2024-11-04 DIAGNOSIS — K76.0 HEPATIC STEATOSIS: Primary | ICD-10-CM

## 2024-11-04 DIAGNOSIS — D18.03 HEMANGIOMA OF LIVER: ICD-10-CM

## 2024-11-04 DIAGNOSIS — K76.0 HEPATIC STEATOSIS: ICD-10-CM

## 2024-11-04 LAB
ALBUMIN: 4.6 G/DL (ref 3.5–5.2)
ALP BLD-CCNC: 50 U/L (ref 40–129)
ALT SERPL-CCNC: 29 U/L (ref 0–40)
ANION GAP SERPL CALCULATED.3IONS-SCNC: 17 MMOL/L (ref 7–16)
AST SERPL-CCNC: 27 U/L (ref 0–39)
BASOPHILS ABSOLUTE: 0.08 K/UL (ref 0–0.2)
BASOPHILS RELATIVE PERCENT: 1 % (ref 0–2)
BILIRUB SERPL-MCNC: 0.7 MG/DL (ref 0–1.2)
BILIRUBIN DIRECT: <0.2 MG/DL (ref 0–0.3)
BILIRUBIN, INDIRECT: NORMAL MG/DL (ref 0–1)
BUN BLDV-MCNC: 11 MG/DL (ref 6–20)
CALCIUM SERPL-MCNC: 9.2 MG/DL (ref 8.6–10.2)
CHLORIDE BLD-SCNC: 96 MMOL/L (ref 98–107)
CHOLESTEROL, TOTAL: 211 MG/DL
CO2: 23 MMOL/L (ref 22–29)
CREAT SERPL-MCNC: 0.8 MG/DL (ref 0.7–1.2)
EOSINOPHILS ABSOLUTE: 0.06 K/UL (ref 0.05–0.5)
EOSINOPHILS RELATIVE PERCENT: 1 % (ref 0–6)
FERRITIN: 98 NG/ML
GFR, ESTIMATED: >90 ML/MIN/1.73M2
GLUCOSE BLD-MCNC: 101 MG/DL (ref 74–99)
HCT VFR BLD CALC: 42.5 % (ref 37–54)
HDLC SERPL-MCNC: 104 MG/DL
HEMOGLOBIN: 14.3 G/DL (ref 12.5–16.5)
IMMATURE GRANULOCYTES %: 1 % (ref 0–5)
IMMATURE GRANULOCYTES ABSOLUTE: 0.03 K/UL (ref 0–0.58)
INR BLD: 0.9
IRON % SATURATION: 23 % (ref 20–55)
IRON: 91 UG/DL (ref 59–158)
LDL CHOLESTEROL: 98 MG/DL
LYMPHOCYTES ABSOLUTE: 1.22 K/UL (ref 1.5–4)
LYMPHOCYTES RELATIVE PERCENT: 20 % (ref 20–42)
MCH RBC QN AUTO: 33.1 PG (ref 26–35)
MCHC RBC AUTO-ENTMCNC: 33.6 G/DL (ref 32–34.5)
MCV RBC AUTO: 98.4 FL (ref 80–99.9)
MONOCYTES ABSOLUTE: 0.58 K/UL (ref 0.1–0.95)
MONOCYTES RELATIVE PERCENT: 9 % (ref 2–12)
NEUTROPHILS ABSOLUTE: 4.27 K/UL (ref 1.8–7.3)
NEUTROPHILS RELATIVE PERCENT: 68 % (ref 43–80)
PDW BLD-RTO: 13.1 % (ref 11.5–15)
PLATELET # BLD: 369 K/UL (ref 130–450)
PMV BLD AUTO: 9.5 FL (ref 7–12)
POTASSIUM SERPL-SCNC: 4.5 MMOL/L (ref 3.5–5)
PROTHROMBIN TIME: 9.6 SEC (ref 9.3–12.4)
RBC # BLD: 4.32 M/UL (ref 3.8–5.8)
SODIUM BLD-SCNC: 136 MMOL/L (ref 132–146)
TOTAL IRON BINDING CAPACITY: 402 UG/DL (ref 250–450)
TOTAL PROTEIN: 7.2 G/DL (ref 6.4–8.3)
TRIGL SERPL-MCNC: 47 MG/DL
VLDLC SERPL CALC-MCNC: 9 MG/DL
WBC # BLD: 6.2 K/UL (ref 4.5–11.5)

## 2024-11-04 PROCEDURE — 99205 OFFICE O/P NEW HI 60 MIN: CPT | Performed by: STUDENT IN AN ORGANIZED HEALTH CARE EDUCATION/TRAINING PROGRAM

## 2024-11-04 PROCEDURE — 3080F DIAST BP >= 90 MM HG: CPT | Performed by: STUDENT IN AN ORGANIZED HEALTH CARE EDUCATION/TRAINING PROGRAM

## 2024-11-04 PROCEDURE — 3077F SYST BP >= 140 MM HG: CPT | Performed by: STUDENT IN AN ORGANIZED HEALTH CARE EDUCATION/TRAINING PROGRAM

## 2024-11-04 ASSESSMENT — ENCOUNTER SYMPTOMS
RESPIRATORY NEGATIVE: 1
ALLERGIC/IMMUNOLOGIC NEGATIVE: 1
EYES NEGATIVE: 1
GASTROINTESTINAL NEGATIVE: 1

## 2024-11-04 NOTE — PROGRESS NOTES
on L3 and 4 mm of retrolisthesis of L3 on L4.    There is multilevel spondylosis with varying degrees of spinal canal  and neural foraminal narrowing as described above. The soft tissues  of the spinal canal and neural foramen are suboptimally evaluated on  this CT study.    MACRO:  None    Signed by: Anthony Ndiaye 8/9/2024 1:44 PM  Dictation workstation:   [id number]       MRI Result (most recent):  MRI HUMERUS RIGHT W WO CONTRAST 03/22/2024    Narrative  EXAMINATION:  MRI OF THE RIGHT HUMERUS WITH AND WITHOUT CONTRAST, 3/22/2024 9:52 am    TECHNIQUE:  Multiplanar multisequence MRI of the right humerus was performed with and  without the administration of intravenous contrast.    COMPARISON:  Shoulder radiographs 03/01/2024, 01/15/2020    HISTORY:  ORDERING SYSTEM PROVIDED HISTORY: Humerus lesion, right  TECHNOLOGIST PROVIDED HISTORY:  STAT Creatinine as needed:->Yes  Reason for exam:->right humeral cortical lesion  What is the sedation requirement?->None    FINDINGS:  Markers have been placed at the area of interest, located at the level of the  mid humerus.  Deep to the markers, there is cortical thickening at the  lateral aspect of the mid humeral shaft at the deltoid muscle insertion on  the bone, compatible with a tug lesion.  No suspicious imaging features are  seen.    No suspicious marrow replacing osseous lesion is seen.  There is  heterogeneous red marrow in the humerus.  No evidence of periostitis.    No significant muscle edema or atrophy.    There is synovitis of the right glenohumeral joint along with tenosynovitis  of the long head biceps tendon.    Impression  Benign-appearing cortical thickening/tug lesion at the humeral insertion of  the deltoid muscle, at the marked area of interest.  No suspicious osseous  lesion is seen involving the humerus.    Right glenohumeral joint synovitis along with tenosynovitis of the right long  head biceps tendon.       Assessment & Plan   Assessment/Plan:  52 yo M

## 2024-11-05 LAB
ANTI-NUCLEAR ANTIBODY (ANA): NEGATIVE
HAV IGM SER IA-ACNC: NONREACTIVE
HEP B S AGB SURF AG: NONREACTIVE
HEPATITIS B CORE IGM ANTIBODY: NONREACTIVE
HEPATITIS C ANTIBODY: NONREACTIVE

## 2024-11-06 LAB
AFP: 6.4 UG/L
ALPHA-1 ANTITRYPSIN: 129 MG/DL (ref 90–200)

## 2024-11-07 LAB — SMOOTH MUSCLE ANTIBODY: NEGATIVE

## 2024-11-11 DIAGNOSIS — M54.16 LUMBAR RADICULOPATHY: Primary | ICD-10-CM

## 2024-11-11 RX ORDER — GABAPENTIN 600 MG/1
600 TABLET ORAL 3 TIMES DAILY
Qty: 90 TABLET | Refills: 11 | Status: SHIPPED | OUTPATIENT
Start: 2024-11-11 | End: 2025-11-11

## 2024-12-30 ENCOUNTER — OFFICE VISIT (OUTPATIENT)
Dept: SURGERY | Age: 54
End: 2024-12-30
Payer: COMMERCIAL

## 2024-12-30 VITALS
TEMPERATURE: 99.1 F | HEART RATE: 104 BPM | BODY MASS INDEX: 30.2 KG/M2 | DIASTOLIC BLOOD PRESSURE: 93 MMHG | HEIGHT: 72 IN | WEIGHT: 223 LBS | SYSTOLIC BLOOD PRESSURE: 136 MMHG

## 2024-12-30 DIAGNOSIS — D18.03 HEMANGIOMA OF LIVER: ICD-10-CM

## 2024-12-30 DIAGNOSIS — K76.0 HEPATIC STEATOSIS: Primary | ICD-10-CM

## 2024-12-30 PROCEDURE — 3075F SYST BP GE 130 - 139MM HG: CPT | Performed by: STUDENT IN AN ORGANIZED HEALTH CARE EDUCATION/TRAINING PROGRAM

## 2024-12-30 PROCEDURE — 99213 OFFICE O/P EST LOW 20 MIN: CPT | Performed by: STUDENT IN AN ORGANIZED HEALTH CARE EDUCATION/TRAINING PROGRAM

## 2024-12-30 PROCEDURE — 3080F DIAST BP >= 90 MM HG: CPT | Performed by: STUDENT IN AN ORGANIZED HEALTH CARE EDUCATION/TRAINING PROGRAM

## 2024-12-30 NOTE — PROGRESS NOTES
Hepatobiliary and Pancreatic Surgery Progress Note    CC: Hepatic steatosis follow up    Subjective:   11/4/24: Mr. Hurst is a 52 yo M with PMH HLD, HTN, BMI 29, who presents for evaluation of liver disease and hepatomegally. Is followed by PCP Dr. Khanna. Recent labs show ALP 47, AST/ALT 36/42, Bilirubin 0.6.  Has had prior imaging with RUQ us showing hepatomegally to 20cm with fatty infiltration and a 2.9cm hemangioma in the left lobe with normal gallbladder and biliary tree. Currently admits to prior trauma to his right upper quadrant and had imaging in 2023 which showed no liver pathology, denies current abdominal pain, nausea, vomiting fevers, chills, chest pain shortness of breath, poor appetite, weight loss. Does drink a case a week of beer and does not smoke, quit in 2023. Has no other hx of illicit drug use or hepatitis. Has no family hx of liver diease or hepatobiliary cancers.     12/30/24: Patient presents today for follow up with MRI which shows no biliary abnormalities or mass lesions. Liver serologies all negative.     OBJECTIVE      Physical    BP (!) 136/93   Pulse (!) 104   Temp 99.1 °F (37.3 °C)   Ht 1.829 m (6')   Wt 101.2 kg (223 lb)   BMI 30.24 kg/m²       General appearance: appears in no acute distress  Lungs:respiratory effort normal without accessory numbers  Heart: no pedal edema  Abdomen: soft, nondistended, nontympanic, no guarding, no peritoneal signs, normoactive bowel sounds  Extremities: ROM normal    ASSESSMENT: 52 yo M with hepatic steatosis likely NAFLD vs alcohol induced, 2.9cm left lobe hemangioma     PLAN:    -Patient's images were reviewed prior to today's visit and discussed in the office today.  MRI does not show any evidence for biliary abnormalities, there was no focal liver lesion however that scan was limited due to metallic artifact.  -He is monitoring his diet and decreasing fats as well as increasing his exercise.  -Follow-up in 1 year with liver

## 2025-01-24 ENCOUNTER — OFFICE VISIT (OUTPATIENT)
Dept: FAMILY MEDICINE CLINIC | Age: 55
End: 2025-01-24

## 2025-01-24 VITALS
SYSTOLIC BLOOD PRESSURE: 128 MMHG | BODY MASS INDEX: 29.93 KG/M2 | WEIGHT: 221 LBS | DIASTOLIC BLOOD PRESSURE: 80 MMHG | RESPIRATION RATE: 18 BRPM | HEART RATE: 81 BPM | HEIGHT: 72 IN | OXYGEN SATURATION: 97 %

## 2025-01-24 DIAGNOSIS — R39.16 BENIGN PROSTATIC HYPERPLASIA (BPH) WITH STRAINING ON URINATION: ICD-10-CM

## 2025-01-24 DIAGNOSIS — R39.14 BENIGN PROSTATIC HYPERPLASIA WITH INCOMPLETE BLADDER EMPTYING: ICD-10-CM

## 2025-01-24 DIAGNOSIS — M51.16 LUMBAR DISC DISEASE WITH RADICULOPATHY: ICD-10-CM

## 2025-01-24 DIAGNOSIS — Z00.00 ANNUAL PHYSICAL EXAM: ICD-10-CM

## 2025-01-24 DIAGNOSIS — R29.818 NEUROGENIC CLAUDICATION: ICD-10-CM

## 2025-01-24 DIAGNOSIS — N40.1 BENIGN PROSTATIC HYPERPLASIA WITH INCOMPLETE BLADDER EMPTYING: ICD-10-CM

## 2025-01-24 DIAGNOSIS — F51.01 PRIMARY INSOMNIA: ICD-10-CM

## 2025-01-24 DIAGNOSIS — R79.89 ABNORMAL LFTS: Primary | ICD-10-CM

## 2025-01-24 DIAGNOSIS — M54.9 BACK PAIN WITH HISTORY OF SPINAL SURGERY: ICD-10-CM

## 2025-01-24 DIAGNOSIS — S32.010A CLOSED COMPRESSION FRACTURE OF L1 VERTEBRA, INITIAL ENCOUNTER (HCC): ICD-10-CM

## 2025-01-24 DIAGNOSIS — M54.50 LUMBAR BACK PAIN: ICD-10-CM

## 2025-01-24 DIAGNOSIS — N40.1 BENIGN PROSTATIC HYPERPLASIA (BPH) WITH STRAINING ON URINATION: ICD-10-CM

## 2025-01-24 DIAGNOSIS — Z98.890 BACK PAIN WITH HISTORY OF SPINAL SURGERY: ICD-10-CM

## 2025-01-24 DIAGNOSIS — S22.000A CLOSED COMPRESSION FRACTURE OF THORACIC VERTEBRA, INITIAL ENCOUNTER (HCC): ICD-10-CM

## 2025-01-24 DIAGNOSIS — F34.1 DYSTHYMIA: ICD-10-CM

## 2025-01-24 RX ORDER — TADALAFIL 2.5 MG/1
1 TABLET ORAL DAILY
Qty: 90 TABLET | Refills: 3 | Status: SHIPPED | OUTPATIENT
Start: 2025-01-24

## 2025-01-24 RX ORDER — DULOXETIN HYDROCHLORIDE 30 MG/1
30 CAPSULE, DELAYED RELEASE ORAL DAILY
Qty: 90 CAPSULE | Refills: 4 | Status: CANCELLED | OUTPATIENT
Start: 2025-01-24

## 2025-01-24 RX ORDER — ZALEPLON 10 MG/1
10 CAPSULE ORAL NIGHTLY
Qty: 90 CAPSULE | Refills: 3 | Status: SHIPPED | OUTPATIENT
Start: 2025-01-24 | End: 2026-01-24

## 2025-01-24 RX ORDER — DULOXETIN HYDROCHLORIDE 60 MG/1
60 CAPSULE, DELAYED RELEASE ORAL 2 TIMES DAILY
Qty: 180 CAPSULE | Refills: 3 | Status: SHIPPED | OUTPATIENT
Start: 2025-01-24

## 2025-01-24 RX ORDER — GABAPENTIN 800 MG/1
800 TABLET ORAL DAILY
Qty: 90 TABLET | Refills: 3 | Status: SHIPPED | OUTPATIENT
Start: 2025-01-24 | End: 2026-01-01

## 2025-01-24 RX ORDER — OXYCODONE AND ACETAMINOPHEN 10; 325 MG/1; MG/1
1 TABLET ORAL EVERY 8 HOURS PRN
Qty: 90 TABLET | Refills: 0 | Status: SHIPPED | OUTPATIENT
Start: 2025-01-24 | End: 2025-02-23

## 2025-01-24 RX ORDER — AMLODIPINE AND BENAZEPRIL HYDROCHLORIDE 10; 20 MG/1; MG/1
CAPSULE ORAL
Qty: 90 CAPSULE | Refills: 3 | Status: SHIPPED | OUTPATIENT
Start: 2025-01-24

## 2025-01-24 RX ORDER — FINASTERIDE 5 MG/1
5 TABLET, FILM COATED ORAL DAILY
Qty: 90 TABLET | Refills: 3 | Status: SHIPPED | OUTPATIENT
Start: 2025-01-24

## 2025-01-24 SDOH — ECONOMIC STABILITY: FOOD INSECURITY: WITHIN THE PAST 12 MONTHS, YOU WORRIED THAT YOUR FOOD WOULD RUN OUT BEFORE YOU GOT MONEY TO BUY MORE.: PATIENT DECLINED

## 2025-01-24 SDOH — ECONOMIC STABILITY: FOOD INSECURITY: WITHIN THE PAST 12 MONTHS, THE FOOD YOU BOUGHT JUST DIDN'T LAST AND YOU DIDN'T HAVE MONEY TO GET MORE.: PATIENT DECLINED

## 2025-01-24 ASSESSMENT — ENCOUNTER SYMPTOMS
SHORTNESS OF BREATH: 0
DIARRHEA: 0
COLOR CHANGE: 0
FACIAL SWELLING: 0
TROUBLE SWALLOWING: 0
EYE REDNESS: 0
EYE DISCHARGE: 0
ABDOMINAL DISTENTION: 0
EYE ITCHING: 0
CONSTIPATION: 0
VOICE CHANGE: 0
APNEA: 0
CHEST TIGHTNESS: 0
ABDOMINAL PAIN: 0
EYE PAIN: 0
PHOTOPHOBIA: 0
WHEEZING: 0
SINUS PRESSURE: 0
STRIDOR: 0
COUGH: 0
RECTAL PAIN: 0
VOMITING: 0
BLOOD IN STOOL: 0
BACK PAIN: 1
ANAL BLEEDING: 0
NAUSEA: 0
SORE THROAT: 0
CHOKING: 0
RHINORRHEA: 0

## 2025-01-24 ASSESSMENT — PATIENT HEALTH QUESTIONNAIRE - PHQ9
5. POOR APPETITE OR OVEREATING: NOT AT ALL
SUM OF ALL RESPONSES TO PHQ QUESTIONS 1-9: 0
6. FEELING BAD ABOUT YOURSELF - OR THAT YOU ARE A FAILURE OR HAVE LET YOURSELF OR YOUR FAMILY DOWN: NOT AT ALL
9. THOUGHTS THAT YOU WOULD BE BETTER OFF DEAD, OR OF HURTING YOURSELF: NOT AT ALL
10. IF YOU CHECKED OFF ANY PROBLEMS, HOW DIFFICULT HAVE THESE PROBLEMS MADE IT FOR YOU TO DO YOUR WORK, TAKE CARE OF THINGS AT HOME, OR GET ALONG WITH OTHER PEOPLE: NOT DIFFICULT AT ALL
1. LITTLE INTEREST OR PLEASURE IN DOING THINGS: NOT AT ALL
SUM OF ALL RESPONSES TO PHQ9 QUESTIONS 1 & 2: 0
SUM OF ALL RESPONSES TO PHQ QUESTIONS 1-9: 0
8. MOVING OR SPEAKING SO SLOWLY THAT OTHER PEOPLE COULD HAVE NOTICED. OR THE OPPOSITE, BEING SO FIGETY OR RESTLESS THAT YOU HAVE BEEN MOVING AROUND A LOT MORE THAN USUAL: NOT AT ALL
SUM OF ALL RESPONSES TO PHQ QUESTIONS 1-9: 0
4. FEELING TIRED OR HAVING LITTLE ENERGY: NOT AT ALL
7. TROUBLE CONCENTRATING ON THINGS, SUCH AS READING THE NEWSPAPER OR WATCHING TELEVISION: NOT AT ALL
SUM OF ALL RESPONSES TO PHQ QUESTIONS 1-9: 0
3. TROUBLE FALLING OR STAYING ASLEEP: NOT AT ALL
2. FEELING DOWN, DEPRESSED OR HOPELESS: NOT AT ALL

## 2025-01-24 NOTE — PROGRESS NOTES
Erik Hurst is a 54 y.o. male  .  Subjective:      Patient had epidural since last visit. Taking Gabapentin. Has helped a little . Had x ray and MRI . Seeing Dr Lopez, back surgeon. This is [facility] for second opinion. Implantable E Stim and pain pump discussed.  . Has had a few good days since increasing gabapentin. Will increase again and see if helps. If does not improve we will consider Lyrica. Using percocet very seldomly when has bad day. Will still provide as long as 90 tabs continues to last 4 to 5 months.. Still not sleeping well but had best night of sleep in years on sonata. Lung cancer screening discussed. This is a 50 min visit.        Review of Systems   Constitutional:  Positive for fatigue. Negative for activity change, appetite change, chills, diaphoresis, fever and unexpected weight change.   HENT:  Negative for congestion, dental problem, drooling, ear discharge, ear pain, facial swelling, hearing loss, mouth sores, nosebleeds, postnasal drip, rhinorrhea, sinus pressure, sneezing, sore throat, tinnitus, trouble swallowing and voice change.    Eyes:  Negative for photophobia, pain, discharge, redness, itching and visual disturbance.   Respiratory:  Negative for apnea, cough, choking, chest tightness, shortness of breath, wheezing and stridor.    Cardiovascular:  Negative for chest pain, palpitations and leg swelling.   Gastrointestinal:  Negative for abdominal distention, abdominal pain, anal bleeding, blood in stool, constipation, diarrhea, nausea, rectal pain and vomiting.   Endocrine: Negative for cold intolerance, heat intolerance, polydipsia, polyphagia and polyuria.   Genitourinary:  Negative for decreased urine volume, difficulty urinating, dysuria, enuresis, flank pain, frequency, genital sores, hematuria, penile discharge, penile pain, penile swelling, scrotal swelling, testicular pain and urgency.   Musculoskeletal:  Positive for arthralgias, back pain, gait problem, joint

## 2025-02-19 ENCOUNTER — OFFICE VISIT (OUTPATIENT)
Dept: ORTHOPEDIC SURGERY | Age: 55
End: 2025-02-19
Payer: COMMERCIAL

## 2025-02-19 VITALS — WEIGHT: 221 LBS | HEIGHT: 72 IN | BODY MASS INDEX: 29.93 KG/M2

## 2025-02-19 DIAGNOSIS — M75.81 TENDINITIS OF RIGHT ROTATOR CUFF: ICD-10-CM

## 2025-02-19 DIAGNOSIS — G89.29 CHRONIC RIGHT SHOULDER PAIN: ICD-10-CM

## 2025-02-19 DIAGNOSIS — M25.511 CHRONIC RIGHT SHOULDER PAIN: ICD-10-CM

## 2025-02-19 DIAGNOSIS — M75.21 BICEPS TENDINITIS OF RIGHT SHOULDER: ICD-10-CM

## 2025-02-19 DIAGNOSIS — M25.811 IMPINGEMENT OF RIGHT SHOULDER: Primary | ICD-10-CM

## 2025-02-19 PROCEDURE — 20610 DRAIN/INJ JOINT/BURSA W/O US: CPT | Performed by: ORTHOPAEDIC SURGERY

## 2025-02-19 PROCEDURE — 99213 OFFICE O/P EST LOW 20 MIN: CPT | Performed by: ORTHOPAEDIC SURGERY

## 2025-02-19 RX ORDER — AMOXICILLIN 500 MG/1
CAPSULE ORAL
COMMUNITY
Start: 2025-02-10

## 2025-02-19 RX ORDER — TRIAMCINOLONE ACETONIDE 40 MG/ML
80 INJECTION, SUSPENSION INTRA-ARTICULAR; INTRAMUSCULAR ONCE
Status: COMPLETED | OUTPATIENT
Start: 2025-02-19 | End: 2025-02-19

## 2025-02-19 RX ADMIN — TRIAMCINOLONE ACETONIDE 80 MG: 40 INJECTION, SUSPENSION INTRA-ARTICULAR; INTRAMUSCULAR at 11:47

## 2025-02-19 ASSESSMENT — ENCOUNTER SYMPTOMS
EYE DISCHARGE: 0
ALLERGIC/IMMUNOLOGIC NEGATIVE: 1
SHORTNESS OF BREATH: 0
ABDOMINAL DISTENTION: 0

## 2025-02-19 NOTE — PROGRESS NOTES
Erik Hurst (:  1970) is a 54 y.o. male,Established patient, here for evaluation of the following chief complaint(s):  Shoulder Pain (Patient is presenting today for right shoulder pain. Pain is made worse with certain movements, laying down and lifting his arm over his head. Patient is RHD. Pain is rated 2-6/10 depending on his activity. )      Assessment & Plan   ASSESSMENT/PLAN:  1. Impingement of right shoulder  2. Chronic right shoulder pain  3. Tendinitis of right rotator cuff  4. Biceps tendinitis of right shoulder        This is a 54 y.o. year old male with Impingement of right shoulder [M25.811]     I discussed a variety of treatment options with the patient today including observation, NSAID, low impact exercise, PT, injections. He would like to proceed with injection    We discussed the risks and benefits of a corticosteroid injection in the right subacromial space. The patient would like to proceed and consents to the procedure. The patient should let us know if they develop any signs of infection, worsening pain or other problems. I discussed if they have diabetes that they should closely monitor their blood glucose level over the next few days.    The right subacromial space was identified and prepped sterilely. Using 2 cc of 40 mg Kenalog and 4 cc of 0.25% bupivacaine the right subacromial space was injected without issue. This was via a posterior portal. The patient tolerated this procedure well and a Band-Aid was placed.    OTC NSAIDs/Tylenol recommended for pain control    Return if symptoms worsen or fail to improve.     Patient would like to discuss shoulder surgery with Dr. Rainey - will refer.    Subjective   SUBJECTIVE/OBJECTIVE:  HPI    54-year-old male here today to discuss his right shoulder.  MRI was previously obtained. He continues to have difficulty with overhead activity.  He denies any previous trauma.  He has been using anti-inflammatories and was previously on a home

## 2025-03-03 ENCOUNTER — OFFICE VISIT (OUTPATIENT)
Dept: ORTHOPEDIC SURGERY | Age: 55
End: 2025-03-03
Payer: COMMERCIAL

## 2025-03-03 VITALS — TEMPERATURE: 98 F | HEIGHT: 72 IN | BODY MASS INDEX: 29.93 KG/M2 | WEIGHT: 221 LBS

## 2025-03-03 DIAGNOSIS — M75.21 BICEPS TENDONITIS ON RIGHT: ICD-10-CM

## 2025-03-03 DIAGNOSIS — M25.811 SHOULDER IMPINGEMENT, RIGHT: Primary | ICD-10-CM

## 2025-03-03 PROCEDURE — 99213 OFFICE O/P EST LOW 20 MIN: CPT | Performed by: ORTHOPAEDIC SURGERY

## 2025-03-03 NOTE — PROGRESS NOTES
Chief Complaint   Patient presents with    Shoulder Pain     Right shoulder pain referral from Dr. Collins. Shoulder not as painful today due to injection from 2 weeks ago. Here to see if surgery is recommended. Tried PT and had no relief.         Erik Hurst is a 54 y.o. year old   male who is seen today  for evaluation of right shoulder pain.  He reports the pain has been ongoing for the past several years.  He does not recall a specific injury which started the pain.  He was referred by Dr. Collins.   He reports the pain is worse with movement, better with rest.  The patient does not have mechanical symptoms.  Hedoes have night pain.  He denies a feeling of instability.  The prior treatments have been OTC medications, PT, CSI.  The patient   has responded to the treatment. The patient is right hand dominant. He is a .      Chief Complaint   Patient presents with    Shoulder Pain     Right shoulder pain referral from Dr. Collins. Shoulder not as painful today due to injection from 2 weeks ago. Here to see if surgery is recommended. Tried PT and had no relief.      Past Medical History:   Diagnosis Date    Anxiety     Depression     Gout     Hyperlipidemia     Hypertension     Pneumonia     Urinary frequency      Past Surgical History:   Procedure Laterality Date    BACK SURGERY      lumbar fusion x2    COLONOSCOPY      FOOT SURGERY Left 11/02/2023    Resection Exostosis Left Foot performed by Nikos Ayoub Jr., DPM at [facility] OR    FOOT SURGERY Left 4/18/2024    Excision Soft Tissue Mass/Deep Left Foot- 30 mins performed by Nikos Ayoub Jr., DPM at [facility] OR    OVAL / ROUND WINDOW FISTULA      x 3- anal       Current Outpatient Medications:     amoxicillin (AMOXIL) 500 MG capsule, TAKE 1 CAPSULE BY MOUTH EVERY 8 HOURS UNTIL GONE, Disp: , Rfl:     zaleplon (SONATA) 10 MG capsule, Take 1 capsule by mouth nightly. Max Daily Amount: 10 mg, Disp: 90 capsule,

## 2025-05-28 ENCOUNTER — OFFICE VISIT (OUTPATIENT)
Dept: FAMILY MEDICINE CLINIC | Age: 55
End: 2025-05-28
Payer: COMMERCIAL

## 2025-05-28 VITALS
WEIGHT: 232 LBS | HEIGHT: 72 IN | BODY MASS INDEX: 31.42 KG/M2 | RESPIRATION RATE: 16 BRPM | HEART RATE: 84 BPM | DIASTOLIC BLOOD PRESSURE: 84 MMHG | OXYGEN SATURATION: 98 % | SYSTOLIC BLOOD PRESSURE: 138 MMHG

## 2025-05-28 DIAGNOSIS — M54.9 BACK PAIN WITH HISTORY OF SPINAL SURGERY: Primary | ICD-10-CM

## 2025-05-28 DIAGNOSIS — M51.16 LUMBAR DISC DISEASE WITH RADICULOPATHY: ICD-10-CM

## 2025-05-28 DIAGNOSIS — F51.01 PRIMARY INSOMNIA: ICD-10-CM

## 2025-05-28 DIAGNOSIS — R29.818 NEUROGENIC CLAUDICATION: ICD-10-CM

## 2025-05-28 DIAGNOSIS — Z98.890 BACK PAIN WITH HISTORY OF SPINAL SURGERY: Primary | ICD-10-CM

## 2025-05-28 DIAGNOSIS — I10 BENIGN ESSENTIAL HTN: ICD-10-CM

## 2025-05-28 DIAGNOSIS — N40.1 BENIGN PROSTATIC HYPERPLASIA (BPH) WITH STRAINING ON URINATION: ICD-10-CM

## 2025-05-28 DIAGNOSIS — F34.1 DYSTHYMIA: ICD-10-CM

## 2025-05-28 DIAGNOSIS — G56.02 CARPAL TUNNEL SYNDROME OF LEFT WRIST: ICD-10-CM

## 2025-05-28 DIAGNOSIS — R73.01 IFG (IMPAIRED FASTING GLUCOSE): ICD-10-CM

## 2025-05-28 DIAGNOSIS — M54.50 LUMBAR BACK PAIN: ICD-10-CM

## 2025-05-28 DIAGNOSIS — E78.2 MIXED HYPERLIPIDEMIA: ICD-10-CM

## 2025-05-28 DIAGNOSIS — R39.16 BENIGN PROSTATIC HYPERPLASIA (BPH) WITH STRAINING ON URINATION: ICD-10-CM

## 2025-05-28 PROCEDURE — 3079F DIAST BP 80-89 MM HG: CPT | Performed by: FAMILY MEDICINE

## 2025-05-28 PROCEDURE — 3075F SYST BP GE 130 - 139MM HG: CPT | Performed by: FAMILY MEDICINE

## 2025-05-28 PROCEDURE — 99214 OFFICE O/P EST MOD 30 MIN: CPT | Performed by: FAMILY MEDICINE

## 2025-05-28 RX ORDER — ZALEPLON 10 MG/1
10 CAPSULE ORAL NIGHTLY
Qty: 90 CAPSULE | Refills: 3 | Status: SHIPPED | OUTPATIENT
Start: 2025-05-28 | End: 2026-05-28

## 2025-05-28 RX ORDER — PREGABALIN 200 MG/1
200 CAPSULE ORAL 2 TIMES DAILY
Qty: 60 CAPSULE | Refills: 2 | Status: SHIPPED | OUTPATIENT
Start: 2025-05-28 | End: 2025-08-26

## 2025-05-28 RX ORDER — BUPROPION HYDROCHLORIDE 150 MG/1
150 TABLET, EXTENDED RELEASE ORAL 2 TIMES DAILY
Qty: 180 TABLET | Refills: 3 | Status: SHIPPED | OUTPATIENT
Start: 2025-05-28

## 2025-05-28 RX ORDER — AMLODIPINE AND BENAZEPRIL HYDROCHLORIDE 10; 20 MG/1; MG/1
CAPSULE ORAL
Qty: 90 CAPSULE | Refills: 3 | Status: SHIPPED | OUTPATIENT
Start: 2025-05-28

## 2025-05-28 RX ORDER — OXYCODONE AND ACETAMINOPHEN 10; 325 MG/1; MG/1
1 TABLET ORAL EVERY 8 HOURS PRN
Qty: 90 TABLET | Refills: 0 | Status: SHIPPED | OUTPATIENT
Start: 2025-05-28 | End: 2025-06-27

## 2025-05-28 NOTE — PROGRESS NOTES
Erik Hurst is a 54 y.o. male  .  Subjective:      Doing well overall . No new complains. Back pain still causing significant pain. CTS much worse. Duloxetine dis not help. Back pain or dysthymia. Gabapentin not helping much. Will try lyrica.  He is to report any any side effects or problems with it.  We will slowly raise up this dose in the lower gabapentin.  Went over appointment with [facility].  Will consider him in follow-up for further injections or radiofrequency nerve ablation.  Overall MRI was not too bad.  We discussed this at length pain is still significant.  He is using approximately 1 Percocet per day.  90 his last him 3 months of the time.  I have no problem with continuing this at that dose.  If anything worsens that he needs more we will have to set up with pain management.  Discussed weight loss.  Insomnia still working well with Sonata.        Review of Systems   Constitutional:  Positive for fatigue. Negative for activity change, appetite change, chills, diaphoresis, fever and unexpected weight change.   HENT:  Negative for congestion, dental problem, drooling, ear discharge, ear pain, facial swelling, hearing loss, mouth sores, nosebleeds, postnasal drip, rhinorrhea, sinus pressure, sneezing, sore throat, tinnitus, trouble swallowing and voice change.    Eyes:  Negative for photophobia, pain, discharge, redness, itching and visual disturbance.   Respiratory:  Negative for apnea, cough, choking, chest tightness, shortness of breath, wheezing and stridor.    Cardiovascular:  Negative for chest pain, palpitations and leg swelling.   Gastrointestinal:  Negative for abdominal distention, abdominal pain, anal bleeding, blood in stool, constipation, diarrhea, nausea, rectal pain and vomiting.   Endocrine: Negative for cold intolerance, heat intolerance, polydipsia, polyphagia and polyuria.   Genitourinary:  Negative for decreased urine volume, difficulty urinating, dysuria, enuresis, flank

## 2025-05-29 ASSESSMENT — ENCOUNTER SYMPTOMS
SHORTNESS OF BREATH: 0
CHEST TIGHTNESS: 0
TROUBLE SWALLOWING: 0
SORE THROAT: 0
EYE REDNESS: 0
ANAL BLEEDING: 0
ABDOMINAL PAIN: 0
COUGH: 0
CONSTIPATION: 0
WHEEZING: 0
CHOKING: 0
RECTAL PAIN: 0
BLOOD IN STOOL: 0
DIARRHEA: 0
APNEA: 0
STRIDOR: 0
EYE PAIN: 0
VOICE CHANGE: 0
EYE ITCHING: 0
ABDOMINAL DISTENTION: 0
COLOR CHANGE: 0
BACK PAIN: 1
FACIAL SWELLING: 0
PHOTOPHOBIA: 0
NAUSEA: 0
SINUS PRESSURE: 0
EYE DISCHARGE: 0
VOMITING: 0
RHINORRHEA: 0

## 2025-05-29 NOTE — PROGRESS NOTES
24     Erik Hurst    : 1970   Sex: male    Age: 53 y.o.    Patient's PCP/Provider is:  Chase Khanna DO    Subjective:  Patient is seen today for follow-up regarding continued care regarding surgical site dehiscence left foot.  Patient has noticed improvement with current dressings being applied.  Patient has been wearing his regular shoe gear with daily activities to tolerance.  He denies any additional issues at this time.    Chief Complaint   Patient presents with    Post-Op Check     Left foot        ROS:  Const: Positives and pertinent negatives as per HPI.    Musculo: Denies symptoms other than stated above.  Neuro: Denies symptoms other than stated above.  Skin: Denies symptoms other than stated above.    Current Medications:    Current Outpatient Medications:     alfuzosin (UROXATRAL) 10 MG extended release tablet, take 1 tablet by mouth once daily, Disp: 30 tablet, Rfl: 3    oxyCODONE-acetaminophen (PERCOCET) 5-325 MG per tablet, Take 1 tablet by mouth daily as needed for Pain., Disp: , Rfl:     indomethacin (INDOCIN) 50 MG capsule, Take 1 capsule by mouth 3 times daily (with meals), Disp: 90 capsule, Rfl: 3    traZODone (DESYREL) 50 MG tablet, Take one or two at bedtime, Disp: 60 tablet, Rfl: 3    amLODIPine-benazepril (LOTREL) 10-20 MG per capsule, TAKE 1 CAPSULE DAILY (Patient taking differently: nightly TAKE 1 CAPSULE DAILY), Disp: 90 capsule, Rfl: 3    tadalafil (CIALIS) 20 MG tablet, Take 1 tablet by mouth as needed for Erectile Dysfunction, Disp: 30 tablet, Rfl: 5    valACYclovir (VALTREX) 500 MG tablet, Take 1 tablet by mouth 2 times daily, Disp: 60 tablet, Rfl: 5    EPINEPHrine (EPIPEN 2-EVERTON) 0.3 MG/0.3ML SOAJ injection, Inject 0.3 mLs into the muscle once for 1 dose Use as directed for allergic reaction, Disp: 2 each, Rfl: 5    Allergies:  Allergies   Allergen Reactions    Peanuts [Peanut Oil] Anaphylaxis     ONLY PEANUTS..... NOT PEANUT OIL        Vitals:    [phone] 
Patient is in today for 1 week post op of left foot. Pcp is Chase Khanna,   Last ov 4/3/24  
none

## 2025-06-03 ENCOUNTER — OFFICE VISIT (OUTPATIENT)
Dept: PHYSICAL MEDICINE AND REHAB | Age: 55
End: 2025-06-03
Payer: COMMERCIAL

## 2025-06-03 VITALS
WEIGHT: 236 LBS | HEIGHT: 72 IN | DIASTOLIC BLOOD PRESSURE: 83 MMHG | BODY MASS INDEX: 31.97 KG/M2 | SYSTOLIC BLOOD PRESSURE: 125 MMHG | HEART RATE: 77 BPM

## 2025-06-03 DIAGNOSIS — M96.1 FAILED BACK SYNDROME: ICD-10-CM

## 2025-06-03 DIAGNOSIS — G89.29 ACUTE EXACERBATION OF CHRONIC LOW BACK PAIN: ICD-10-CM

## 2025-06-03 DIAGNOSIS — Z98.1 S/P LUMBAR FUSION: Primary | ICD-10-CM

## 2025-06-03 DIAGNOSIS — M54.50 ACUTE EXACERBATION OF CHRONIC LOW BACK PAIN: ICD-10-CM

## 2025-06-03 PROCEDURE — 99203 OFFICE O/P NEW LOW 30 MIN: CPT | Performed by: PHYSICAL MEDICINE & REHABILITATION

## 2025-06-03 PROCEDURE — 3074F SYST BP LT 130 MM HG: CPT | Performed by: PHYSICAL MEDICINE & REHABILITATION

## 2025-06-03 PROCEDURE — 3079F DIAST BP 80-89 MM HG: CPT | Performed by: PHYSICAL MEDICINE & REHABILITATION

## 2025-06-03 RX ORDER — GABAPENTIN 600 MG/1
600 TABLET ORAL 3 TIMES DAILY
COMMUNITY

## 2025-06-03 ASSESSMENT — ENCOUNTER SYMPTOMS
ABDOMINAL DISTENTION: 0
EYE DISCHARGE: 0
SHORTNESS OF BREATH: 0
ALLERGIC/IMMUNOLOGIC NEGATIVE: 1

## 2025-06-03 NOTE — PROGRESS NOTES
Exam  Constitutional:       Appearance: Normal appearance.   HENT:      Head: Normocephalic and atraumatic.      Nose: Nose normal.   Eyes:      Extraocular Movements: Extraocular movements intact.   Cardiovascular:      Rate and Rhythm: Normal rate and regular rhythm.   Pulmonary:      Effort: Pulmonary effort is normal.   Abdominal:      Palpations: Abdomen is soft.   Musculoskeletal:      Cervical back: Normal range of motion.      Comments: L hand -   Subjective numbness digits 1-3  +nerve compression  Fingers wwp   Skin:     General: Skin is warm and dry.      Capillary Refill: Capillary refill takes less than 2 seconds.   Neurological:      General: No focal deficit present.      Mental Status: He is alert.   Psychiatric:         Mood and Affect: Mood normal.         Behavior: Behavior normal.            Past records reviewed including PCP notes    XRAYS:  none    An electronic signature was used to authenticate this note.    --Jamison Collins MD

## 2025-06-03 NOTE — PROGRESS NOTES
per week     Comment: 1 case beer weekly    Drug use: Yes     Types: Marijuana (Weed)          Functional Status:   The patient is able to ambulate and perform activities of daily living without the use of an assistive device.         ROS:    Constitutional: Denies fevers, chills, night sweats, unintentional weight loss     Skin: Denies rash or skin changes     Eyes: Denies vision changes    Ears/Nose/Throat: Denies nasal congestion or sore throat     Respiratory: Denies SOB or cough     Cardiovascular: Denies CP, palpitations, edema      Gastrointestinal: Denies abdominal pain,  N/V, constipation, or diarrhea    Genitourinary: Denies urinary symptoms    Neurologic: See HPI.     MSK: See HPI.     Psychiatric: Denies sleep disturbance, anxiety, depression    Hematologic/Lymphatic/Immunologic: Denies bruising       Physical Exam:   Blood pressure 125/83, pulse 77, height 1.829 m (6'), weight 107 kg (236 lb).   General: well developed and well nourished in no acute distress.    Imaging: (personally reviewed by me 06/03/25)  MRI L Spine 3/20/25  At L1/L2, there are increasing degenerative changes and there is now   moderate central stenosis due to a bulging disc and facet hypertrophy.    There is mild right and moderate left foraminal narrowing due to the   bulging discs.     At L2, there is a kyphoplasty.  There is no bony retropulsion.     At L2/L3, there is mild osteophytic spurring.  There is no longer central   stenosis at this level.  There are postoperative changes.  There is mild   right foraminal narrowing due to facet hypertrophy.  The left foramen is   patent.     At L3/L4, there are laminectomy defects.  There is no spinal stenosis.    There are facet hypertrophic changes.  There is minimal neural foraminal   narrowing due to facet hypertrophy.     At L4/L5, there are laminectomy defects.  There is no spinal stenosis.    The neural foramina are patent.     At L5/S1, there are postoperative changes.  There is

## 2025-06-04 ENCOUNTER — OFFICE VISIT (OUTPATIENT)
Dept: ORTHOPEDIC SURGERY | Age: 55
End: 2025-06-04
Payer: COMMERCIAL

## 2025-06-04 VITALS — HEIGHT: 72 IN | BODY MASS INDEX: 31.97 KG/M2 | WEIGHT: 236 LBS

## 2025-06-04 DIAGNOSIS — G56.02 LEFT CARPAL TUNNEL SYNDROME: Primary | ICD-10-CM

## 2025-06-04 PROCEDURE — 20526 THER INJECTION CARP TUNNEL: CPT | Performed by: ORTHOPAEDIC SURGERY

## 2025-06-04 RX ORDER — TRIAMCINOLONE ACETONIDE 40 MG/ML
40 INJECTION, SUSPENSION INTRA-ARTICULAR; INTRAMUSCULAR ONCE
Status: COMPLETED | OUTPATIENT
Start: 2025-06-04 | End: 2025-06-04

## 2025-06-04 RX ADMIN — TRIAMCINOLONE ACETONIDE 40 MG: 40 INJECTION, SUSPENSION INTRA-ARTICULAR; INTRAMUSCULAR at 11:55

## 2025-06-05 DIAGNOSIS — N40.1 BENIGN PROSTATIC HYPERPLASIA WITH INCOMPLETE BLADDER EMPTYING: ICD-10-CM

## 2025-06-05 DIAGNOSIS — R39.14 BENIGN PROSTATIC HYPERPLASIA WITH INCOMPLETE BLADDER EMPTYING: ICD-10-CM

## 2025-06-05 RX ORDER — ALFUZOSIN HYDROCHLORIDE 10 MG/1
10 TABLET, EXTENDED RELEASE ORAL DAILY
Qty: 90 TABLET | Refills: 0 | Status: SHIPPED | OUTPATIENT
Start: 2025-06-05

## 2025-06-05 NOTE — TELEPHONE ENCOUNTER
Name of Medication(s) Requested:  Requested Prescriptions     Pending Prescriptions Disp Refills    alfuzosin (UROXATRAL) 10 MG extended release tablet [Pharmacy Med Name: ALFUZOSIN HCL ER TABS 10MG] 90 tablet 0     Sig: Take 1 tablet by mouth daily       Medication is on current medication list Yes    Dosage and directions were verified? Yes    Quantity verified: 90 day supply     Pharmacy Verified?  Yes    Last Appointment:  5/28/2025    Future appts:  Future Appointments   Date Time Provider Department Center   8/26/2025 12:30 PM Chase Khanna DO [facility]   9/8/2025 11:30 AM Marv Rainey DO [facility]        (If no appt send self scheduling link. .[email or web address])  Scheduling request sent?     [] Yes  [x] No    Does patient need updated?  [] Yes  [x] No

## 2025-06-24 ENCOUNTER — OFFICE VISIT (OUTPATIENT)
Dept: PAIN MEDICINE | Facility: HOSPITAL | Age: 55
End: 2025-06-24
Payer: COMMERCIAL

## 2025-06-24 DIAGNOSIS — M54.16 LUMBAR RADICULOPATHY: ICD-10-CM

## 2025-06-24 PROCEDURE — 99214 OFFICE O/P EST MOD 30 MIN: CPT | Performed by: ANESTHESIOLOGY

## 2025-06-24 ASSESSMENT — PAIN SCALES - GENERAL: PAINLEVEL_OUTOF10: 7

## 2025-06-24 NOTE — PROGRESS NOTES
Chief Complaint   Patient presents with    Back Pain    Extremity Pain     Subjective   Patient ID: Osacr Szymanski is a 54 y.o. male with a past medical history of FBBS s/p L2 kypho, L4-S1 fusion who presents for follow-up.  HPI:   Oscar Szymanski is a 54 y.o. male with a past medical history of FBBS s/p L2 kypho, L4-S1 fusion who presents for follow-up.  Patient had caudal epidural steroid injection in October 2024 from which he reports he had about 60 to 70% relief until about February of this year, notes both pain and function was significantly improved.  He reports that during the 4-month period that he had relief he was able to walk much further.  He reports that he was even walking almost 10,000 steps a day during that period.  He reports that he has had return of pain since February and has progressively gotten worse and now is at the point to where it was before he had the last caudal injection.  He reports that the pain can be as bad as a 7 out of 10 and is much worse with standing and walking.  Pain is almost relieved with sitting down.  He reports that although he can still walk quite a bit now it is becoming more difficult for him to go as far as he was able to do the injection.  He did see a PM&R physician at [facility] and had an MRI of his lumbar spine to get a second opinion and they told him that he did not need a second surgery.  He is currently still taking gabapentin 600 mg 3 times per day reports no side effects.  He reports he is getting Percocet from his primary care physician and takes 1 at nighttime to help with the pain and to help him sleep.  He reports that the pain is now starting to inhibit his activities that he likes to do on a daily basis such as walking and being active.  He reports that this is affecting his quality of life.  He is interested in any sort of treatment that would help.  He is interested in a caudal injection again to see if that will provide relief.    The patient says  that in 2010 he underwent a spinal fusion and did really well. He was doing extremely well until 2019 when he fell off a deck. He had fracture for vertebral bodies and ultimately needed further spine surgery above which was done in January 2023. The patient says that he has troubles with pain that is more focal and located in the right low back. He only has a pain with standing and it completely goes away with sitting and lying down. The pain is sharp in nature and severe when it occurs. It can be an 7 out of 10 and is disabling at times. He is still able to workout using a recumbent bike and walking. He has tried medications including ibuprofen, indomethacin, meloxicam was recently prescribed but he does not see any efficacy with that. He has never tried a nerve medication. He has done prior formal physical therapy and keeps up with physician directed exercises and stretches at least 3-4 times a week and has done so over the past 6 months or more.     Physical Therapy: The patient completed more than six weeks of formal physical therapy more than six months ago, but has done physician-directed exercises at home for 2-3 times per week for greater than six weeks with minimal improvement  Other Conservative Measures he has tried: Heating Pad, Ice, and Injections  Classes of medications tried in the past: Acetaminophen, NSAIDs, Gabapentenoids, and Opioids    Review of Systems   13-point ROS done and negative except for HPI.     Current Outpatient Medications   Medication Instructions    alfuzosin (Uroxatral) 10 mg 24 hr tablet 1 tablet, Daily (0630)    amLODIPine-benazepriL (Lotrel) 10-20 mg capsule 1 capsule, Daily    gabapentin (Neurontin) 300 mg capsule Take 1 capsule (300 mg) by mouth once daily at bedtime for 1 day, THEN 1 capsule (300 mg) 2 times a day for 1 day, THEN 1 capsule (300 mg) 3 times a day for 1 day. Thereafter, increase by one capsule every third day until taking two capsules three times a day.     gabapentin (NEURONTIN) 600 mg, oral, 3 times daily    meloxicam (MOBIC) 15 mg, oral, Daily       Medical History[1]     Surgical History[2]     Family History[3]     RX Allergies[4]     Objective     There were no vitals filed for this visit.     Physical Exam  General: NAD, well groomed, well nourished  Eyes: Non-icteric sclera, EOMI  Ears, Nose, Mouth, and Throat: External ears and nose appear to be without deformity or rash. No lesions or masses noted. Hearing is grossly intact.   Neck: Trachea midline  Respiratory: Nonlabored breathing   Cardiovascular: no peripheral edema   Skin: No rashes or open lesions/ulcers identified on skin.    Back:   Palpation: Minimal tenderness to palpation over lumbar paraspinous muscles.   Straight leg raise: does not reproduce their pain, bilaterally     Neurologic:   Cranial nerves grossly intact.   Strength 5/5 BLE and symmetric plantar/dorsiflexion   Sensation: Normal to light touch throughout in BLE.  Gait: normal  Uses Assist Device: no    Psychiatric: Alert, orientation to person, place, and time. Cooperative.    Imaging personally reviewed and independently interpreted:   MRI Lumbar Spine (3/20/25):  IMPRESSION:    1.  Interval surgery.  The patient is now noted to be status post  posterior fusion from L2 through S1.  There is again noted a kyphoplasty  at L2.  There is no spinal stenosis at the postoperative levels.    2.  Interval degenerative change at L1/L2 with new moderate  multifactorial central stenosis.          Transcribe Date/Time: Mar 21 2025  8:24A    Dictated by: JOHN TAO MD    This examination was interpreted and the report reviewed and  electronically signed by:  JOHN TAO MD on Mar 21 2025  8:29AM  EST        Thank you for allowing us to participate in the care of your patient.  Should there be any questions regarding this interpretation, please call  [phone].  If you are unable to reach us at the number above,  please feel free to  contact [facility] at [phone].  Narrative    * * *Final Report* * *    DATE OF EXAM: Mar 20 2025  7:40AM      [facility]   0303  -  MRI LUMBAR SPINE WO IVCON  / ACCESSION #  [id number]    PROCEDURE REASON: SPINAL STENOSIS OF LUMBAR REGION WITHOUT NEUROGENIC  CLAUDICATION        * * * * Physician Interpretation * * * *        RESULT: HISTORY:    54-year-old with low back pain.  The patient is status post a remote fall  and status post remote surgery.    TECHNIQUE:    Routine MR imaging through the lumbar spine is performed without IV  contrast administration.  Comparison is made to a study 05/13/2022.    RESULT:    Counting reference:  Lumbosacral junction.  For the purposes of this  report, L4-5 is considered the level of the iliac crest and the last  lumbar-type disc space is considered L5/S1.    Please note that lack of IV contrast because evaluation for epidural  fibrosis in this postoperative patient.    Coronal localizer images reveal a levoscoliosis centered at the L3/L4  level.    Sagittal images reveal interval surgery when compared to the prior study.   On the prior exam, the patient was status post posterior fusion from L4  through S1 with bilateral pedicle screws at the L4 and S1 levels.  The  patient was also status post kyphoplasty at L2.  On the current study,  the patient is status post posterior fusion from L2 through S1 with  bilateral pedicle screws at L2, L3, L4, and S1.  There is again noted a  kyphoplasty at L2.  There is a persistent grade 1 anterolisthesis of L5  upon S1 with near total obliteration of the disc space at this level.    The conus medullaris terminates at the L1 level and appears unremarkable.   There is no clumping of the nerve roots of the cauda equina.  There are  no prevertebral masses.    Axial images at T12/L1 reveal no compression of the thecal sac nor the  neural foramina.  There are facet hypertrophic changes.    At L1/L2, there are increasing degenerative  changes and there is now  moderate central stenosis due to a bulging disc and facet hypertrophy.    There is mild right and moderate left foraminal narrowing due to the  bulging discs.    At L2, there is a kyphoplasty.  There is no bony retropulsion.    At L2/L3, there is mild osteophytic spurring.  There is no longer central  stenosis at this level.  There are postoperative changes.  There is mild  right foraminal narrowing due to facet hypertrophy.  The left foramen is  patent.    At L3/L4, there are laminectomy defects.  There is no spinal stenosis.    There are facet hypertrophic changes.  There is minimal neural foraminal  narrowing due to facet hypertrophy.    At L4/L5, there are laminectomy defects.  There is no spinal stenosis.    The neural foramina are patent.    At L5/S1, there are postoperative changes.  There is no spinal stenosis.    There is moderately severe neural foraminal narrowing due to the  anterolisthesis similar to the prior study.  Procedure Note    Provider, [facility] - 03/21/2025  Formatting of this note might be different from the original.  * * *Final Report* * *    DATE OF EXAM: Mar 20 2025  7:40AM      [facility]   0303  -  MRI LUMBAR SPINE WO IVCON  / ACCESSION #  [id number]    PROCEDURE REASON: SPINAL STENOSIS OF LUMBAR REGION WITHOUT NEUROGENIC  CLAUDICATION    * * * * Physician Interpretation * * * *        RESULT: HISTORY:    54-year-old with low back pain.  The patient is status post a remote fall  and status post remote surgery.    TECHNIQUE:    Routine MR imaging through the lumbar spine is performed without IV  contrast administration.  Comparison is made to a study 05/13/2022.    RESULT:    Counting reference:  Lumbosacral junction.  For the purposes of this  report, L4-5 is considered the level of the iliac crest and the last  lumbar-type disc space is considered L5/S1.    Please note that lack of IV contrast because evaluation for epidural  fibrosis in this  postoperative patient.    Coronal localizer images reveal a levoscoliosis centered at the L3/L4  level.    Sagittal images reveal interval surgery when compared to the prior study.   On the prior exam, the patient was status post posterior fusion from L4  through S1 with bilateral pedicle screws at the L4 and S1 levels.  The  patient was also status post kyphoplasty at L2.  On the current study,  the patient is status post posterior fusion from L2 through S1 with  bilateral pedicle screws at L2, L3, L4, and S1.  There is again noted a  kyphoplasty at L2.  There is a persistent grade 1 anterolisthesis of L5  upon S1 with near total obliteration of the disc space at this level.    The conus medullaris terminates at the L1 level and appears unremarkable.   There is no clumping of the nerve roots of the cauda equina.  There are  no prevertebral masses.    Axial images at T12/L1 reveal no compression of the thecal sac nor the  neural foramina.  There are facet hypertrophic changes.    At L1/L2, there are increasing degenerative changes and there is now  moderate central stenosis due to a bulging disc and facet hypertrophy.    There is mild right and moderate left foraminal narrowing due to the  bulging discs.    At L2, there is a kyphoplasty.  There is no bony retropulsion.    At L2/L3, there is mild osteophytic spurring.  There is no longer central  stenosis at this level.  There are postoperative changes.  There is mild  right foraminal narrowing due to facet hypertrophy.  The left foramen is  patent.    At L3/L4, there are laminectomy defects.  There is no spinal stenosis.    There are facet hypertrophic changes.  There is minimal neural foraminal  narrowing due to facet hypertrophy.    At L4/L5, there are laminectomy defects.  There is no spinal stenosis.    The neural foramina are patent.    At L5/S1, there are postoperative changes.  There is no spinal stenosis.    There is moderately severe neural foraminal  narrowing due to the  anterolisthesis similar to the prior study.    IMPRESSION  IMPRESSION:    1.  Interval surgery.  The patient is now noted to be status post  posterior fusion from L2 through S1.  There is again noted a kyphoplasty  at L2.  There is no spinal stenosis at the postoperative levels.    2.  Interval degenerative change at L1/L2 with new moderate  multifactorial central stenosis.    Xray Lumbar Spine (3/20/25):  IMPRESSION:  FUSION L2-S1 LEVEL WITH DEGENERATIVE CHANGES LUMBAR SPINE AS  DESCRIBED ABOVE    WORSENING ANTEROLISTHESIS L5 ON S1 AS DESCRIBED ABOVE WHEN COMPARED TO  PREVIOUS MRI STUDY        Transcribe Date/Time: Mar 20 2025  4:52P    Dictated by: MELIDA GARSIA DO    This examination was interpreted and the report reviewed and  electronically signed by:  MELIDA GARSIA DO on Mar 20 2025  5:23PM  EST        Thank you for allowing us to participate in the care of your patient.  Should there be any questions regarding this interpretation, please call  [phone].  If you are unable to reach us at the number above,  please feel free to contact [facility]iology at [phone].  Narrative    * * *Final Report* * *    DATE OF EXAM: Mar 20 2025  7:52AM      [facility]   5229  -  XR LUMBAR 2V AP/LAT  / ACCESSION #  [id number]    PROCEDURE REASON: SPINAL STENOSIS OF LUMBAR REGION WITHOUT NEUROGENIC  CLAUDICATION        * * * * Physician Interpretation * * * *        RESULT: AP and lateral views lumbar spine were obtained.  This study is  compared to MRI study dated 05/13/2022    There is a posterior fusion L4-S1 level with bilateral posterior rods and  pedicle screws.  These are in satisfactory position.  There is no  loosening other complication.  There is decompression L3-5 level.  There  is vertebroplasty of moderate compression deformity of L2.  I see no  other fracture or destructive process.  There is approximately 2 cm of  anterolisthesis of L5 on S1 that appears worsened from previous  MRI  study.  There is mild to moderate degenerative changes of the T12 through  L2 level and less so throughout.  There is mild to moderate degenerative  changes of the facet joints and posterior elements  L3-S1.  There is loss  of bone density.  Procedure Note    Provider, [facility] [place] - 03/20/2025  Formatting of this note might be different from the original.  * * *Final Report* * *    DATE OF EXAM: Mar 20 2025  7:52AM      [facility]   5229  -  XR LUMBAR 2V AP/LAT  / ACCESSION #  [id number]    PROCEDURE REASON: SPINAL STENOSIS OF LUMBAR REGION WITHOUT NEUROGENIC  CLAUDICATION    * * * * Physician Interpretation * * * *    RESULT: AP and lateral views lumbar spine were obtained.  This study is  compared to MRI study dated 05/13/2022    There is a posterior fusion L4-S1 level with bilateral posterior rods and  pedicle screws.  These are in satisfactory position.  There is no  loosening other complication.  There is decompression L3-5 level.  There  is vertebroplasty of moderate compression deformity of L2.  I see no  other fracture or destructive process.  There is approximately 2 cm of  anterolisthesis of L5 on S1 that appears worsened from previous MRI  study.  There is mild to moderate degenerative changes of the T12 through  L2 level and less so throughout.  There is mild to moderate degenerative  changes of the facet joints and posterior elements  L3-S1.  There is loss  of bone density.    IMPRESSION  IMPRESSION:  FUSION L2-S1 LEVEL WITH DEGENERATIVE CHANGES LUMBAR SPINE AS  DESCRIBED ABOVE    WORSENING ANTEROLISTHESIS L5 ON S1 AS DESCRIBED ABOVE WHEN COMPARED TO  PREVIOUS MRI STUDY    Assessment/Plan   Oscar Szymanski is a 54 y.o. male with a past medical history of FBBS s/p L2 kypho, L4-S1 fusion who presents for follow-up.  Patient had caudal epidural steroid injection in October 2024 from which she reports he had about 60 to 70% relief until about February of this year. Pain has returned to pre-injection  level, and patient is interested in repeat injection. He did see PMR physician at another hospital and had updated MRI lumbar spine that showed adjacent level disease above his previous fusion. However he reports the pain is the same it was before the injection. We will plan for caudal MARIXA, if no significant relief we can consider addressing the adjacent level disease. Will obtain MRI images from outside hospital.    Plan:  - We will schedule the patient for a repeat caudal MARIXA under fluoroscopy guidance  - We discussed  the risks, benefits and alternatives of the procedure including but not limited to: , Epidural hematoma, Post-dural puncture headache, Lack of efficacy , Transiently worsening pain , Bleeding, Infection , and Nerve Damage    - We will obtain the MRI lumbar spine that he had done at [facility] recently--electronic transfer request made.  Will review once obtained.  I did discuss with the patient that his imaging did show some moderate amount of stenosis based on the report at L1-2 which is above the prior surgery, adjacent level disease is common.  He also has rather high-grade degenerative changes at L5-S1, degenerative disc disease and foraminal narrowing.   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 We did briefly discussed dorsal column stimulation today.  We also discussed stimulation at his prior visit.    The patient has failed treatment with : Physical therapy , three or more classes of medications, injections, have significant limitations of their quality of life due to the pain, and have significant impairments of their activities of daily living (ADLs) due to the pain    Follow up: After procedure    The patient was invited to contact us back anytime with any questions or concerns and follow-up with us in the  office as needed.     Diagnoses and all orders for this visit:  Lumbar radiculopathy  -     FL pain management; Future  -     Epidural Steroid Injection; Future  Other orders  -     NPO Diet Except: Sips with meds; Effective now; Standing  -     Height and weight; Standing  -     Insert and maintain peripheral IV; Standing  -     Saline lock IV; Standing  -     Type And Screen; Standing  -     Adult diet Regular; Standing  -     Vital Signs; Standing  -     Neuro checks; Standing  -     Notify physician - Standard Parameters; Standing  -     Continue IV fluids ordered pre-procedure; Standing  -     Prior to Discharge O2 Weaning; Standing  -     Pulse oximetry, continuous; Standing      This note was generated with the aid of dictation software, there may be typos despite my attempts at proofreading.   The patient was discussed and seen with Dr. Luke.  Mohan Armando MD  PGY-5  Interventional Pain Fellow         [1] History reviewed. No pertinent past medical history.  [2] No past surgical history on file.  [3] No family history on file.  [4]   Allergies  Allergen Reactions    Peanut Anaphylaxis

## 2025-07-07 DIAGNOSIS — M54.16 LUMBAR RADICULOPATHY: ICD-10-CM

## 2025-07-11 ENCOUNTER — HOSPITAL ENCOUNTER (OUTPATIENT)
Facility: HOSPITAL | Age: 55
Discharge: HOME | End: 2025-07-11
Payer: COMMERCIAL

## 2025-07-11 VITALS
HEART RATE: 71 BPM | BODY MASS INDEX: 31.15 KG/M2 | SYSTOLIC BLOOD PRESSURE: 137 MMHG | HEIGHT: 72 IN | RESPIRATION RATE: 16 BRPM | WEIGHT: 230 LBS | TEMPERATURE: 98.2 F | OXYGEN SATURATION: 99 % | DIASTOLIC BLOOD PRESSURE: 94 MMHG

## 2025-07-11 DIAGNOSIS — M54.16 LUMBAR RADICULOPATHY: ICD-10-CM

## 2025-07-11 PROCEDURE — 7100000010 HC PHASE TWO TIME - EACH INCREMENTAL 1 MINUTE

## 2025-07-11 PROCEDURE — 7100000009 HC PHASE TWO TIME - INITIAL BASE CHARGE

## 2025-07-11 PROCEDURE — 62323 NJX INTERLAMINAR LMBR/SAC: CPT | Performed by: ANESTHESIOLOGY

## 2025-07-11 PROCEDURE — 2550000001 HC RX 255 CONTRASTS: Performed by: ANESTHESIOLOGY

## 2025-07-11 PROCEDURE — 2500000004 HC RX 250 GENERAL PHARMACY W/ HCPCS (ALT 636 FOR OP/ED): Performed by: ANESTHESIOLOGY

## 2025-07-11 RX ORDER — METHYLPREDNISOLONE ACETATE 40 MG/ML
INJECTION, SUSPENSION INTRA-ARTICULAR; INTRALESIONAL; INTRAMUSCULAR; SOFT TISSUE
Status: COMPLETED | OUTPATIENT
Start: 2025-07-11 | End: 2025-07-11

## 2025-07-11 RX ORDER — LIDOCAINE HYDROCHLORIDE 5 MG/ML
INJECTION, SOLUTION INFILTRATION; INTRAVENOUS
Status: COMPLETED | OUTPATIENT
Start: 2025-07-11 | End: 2025-07-11

## 2025-07-11 RX ADMIN — METHYLPREDNISOLONE ACETATE 40 MG: 40 INJECTION, SUSPENSION INTRA-ARTICULAR; INTRALESIONAL; INTRAMUSCULAR; SOFT TISSUE at 13:31

## 2025-07-11 RX ADMIN — IOHEXOL 2 ML: 240 INJECTION, SOLUTION INTRATHECAL; INTRAVASCULAR; INTRAVENOUS; ORAL at 13:31

## 2025-07-11 RX ADMIN — LIDOCAINE HYDROCHLORIDE 11 ML: 5 INJECTION, SOLUTION INFILTRATION at 13:30

## 2025-07-11 ASSESSMENT — PAIN - FUNCTIONAL ASSESSMENT
PAIN_FUNCTIONAL_ASSESSMENT: 0-10
PAIN_FUNCTIONAL_ASSESSMENT: WONG-BAKER FACES
PAIN_FUNCTIONAL_ASSESSMENT: 0-10
PAIN_FUNCTIONAL_ASSESSMENT: WONG-BAKER FACES

## 2025-07-11 ASSESSMENT — COLUMBIA-SUICIDE SEVERITY RATING SCALE - C-SSRS
2. HAVE YOU ACTUALLY HAD ANY THOUGHTS OF KILLING YOURSELF?: NO
1. IN THE PAST MONTH, HAVE YOU WISHED YOU WERE DEAD OR WISHED YOU COULD GO TO SLEEP AND NOT WAKE UP?: NO
6. HAVE YOU EVER DONE ANYTHING, STARTED TO DO ANYTHING, OR PREPARED TO DO ANYTHING TO END YOUR LIFE?: NO

## 2025-07-11 ASSESSMENT — PAIN SCALES - GENERAL
PAINLEVEL_OUTOF10: 6
PAINLEVEL_OUTOF10: 0 - NO PAIN
PAINLEVEL_OUTOF10: 6
PAINLEVEL_OUTOF10: 6

## 2025-07-11 NOTE — H&P
Pain Management H&P    History Of Present Illness  Oscar Szymanski is a 54 y.o. male presents for procedure stated below. Endorses no changes in past medical history or medical health since last seen in clinic.      Past Medical History  He has no past medical history on file.    Surgical History  He has no past surgical history on file.     Social History  He reports that he has quit smoking. His smoking use included cigarettes. He does not have any smokeless tobacco history on file. He reports current alcohol use of about 24.0 standard drinks of alcohol per week. He reports current drug use. Drug: Marijuana.    Family History  Family History[1]     Allergies  Peanut    Review of Symptoms:   Constitutional: Negative for chills, diaphoresis or fever  HENT: Negative for neck swelling  Eyes:.  Negative for eye pain  Respiratory:.  Negative for cough, shortness of breath or wheezing    Cardiovascular:.  Negative for chest pain or palpitations  Gastrointestinal:.  Negative for abdominal pain, nausea and vomiting  Genitourinary:.  Negative for urgency  Musculoskeletal:  Positive for back pain. Positive for joint pain. Denies falls within the past 3 months.  Skin: Negative for wounds or itching   Neurological: Negative for dizziness, seizures, loss of consciousness and weakness  Endo/Heme/Allergies: Does not bruise/bleed easily  Psychiatric/Behavioral: Negative for depression. The patient does not appear anxious.      Pre-sedation Evaluation  ASA class 2  Mallampati score 2     PHYSICAL EXAM  Vitals signs reviewed  Constitutional:       General: Not in acute distress     Appearance: Normal appearance. Not ill-appearing.  HENT:     Head: Normocephalic and atraumatic  Eyes:     Conjunctiva/sclera: Conjunctivae normal  Cardiovascular:     Rate and Rhythm: Normal rate and regular rhythm  Pulmonary:     Effort: No respiratory distress  Abdominal:     Palpations: Abdomen is soft  Musculoskeletal: JONES  Skin:     General: Skin  is warm and dry  Neurological:     General: No focal deficit present  Psychiatric:         Mood and Affect: Mood normal         Behavior: Behavior normal     Last Recorded Vitals  /85   Pulse 76   Temp 37.2 °C (99 °F) (Temporal)   Resp 16   Wt 104 kg (230 lb)   SpO2 98%     Relevant Results  Current Outpatient Medications   Medication Instructions    alfuzosin (Uroxatral) 10 mg 24 hr tablet 1 tablet, Daily (0630)    amLODIPine-benazepriL (Lotrel) 10-20 mg capsule 1 capsule, Daily    gabapentin (Neurontin) 300 mg capsule Take 1 capsule (300 mg) by mouth once daily at bedtime for 1 day, THEN 1 capsule (300 mg) 2 times a day for 1 day, THEN 1 capsule (300 mg) 3 times a day for 1 day. Thereafter, increase by one capsule every third day until taking two capsules three times a day.    gabapentin (NEURONTIN) 600 mg, oral, 3 times daily    meloxicam (MOBIC) 15 mg, oral, Daily         MR lumbar spine wo IV contrast     Narrative  * * *Final Report* * *    DATE OF EXAM: Mar 20 2025  7:40AM    [facility]   0303  -  MRI LUMBAR SPINE WO IVCON  / ACCESSION #  [id number]    PROCEDURE REASON: SPINAL STENOSIS OF LUMBAR REGION WITHOUT NEUROGENIC  CLAUDICATION    * * * * Physician Interpretation * * * *        RESULT: HISTORY:    54-year-old with low back pain.  The patient is status post a remote fall  and status post remote surgery.    TECHNIQUE:    Routine MR imaging through the lumbar spine is performed without IV  contrast administration.  Comparison is made to a study 05/13/2022.    RESULT:    Counting reference:  Lumbosacral junction.  For the purposes of this  report, L4-5 is considered the level of the iliac crest and the last  lumbar-type disc space is considered L5/S1.    Please note that lack of IV contrast because evaluation for epidural  fibrosis in this postoperative patient.    Coronal localizer images reveal a levoscoliosis centered at the L3/L4  level.    Sagittal images reveal interval surgery when compared to  the prior study.  On the prior exam, the patient was status post posterior fusion from L4  through S1 with bilateral pedicle screws at the L4 and S1 levels.  The  patient was also status post kyphoplasty at L2.  On the current study,  the patient is status post posterior fusion from L2 through S1 with  bilateral pedicle screws at L2, L3, L4, and S1.  There is again noted a  kyphoplasty at L2.  There is a persistent grade 1 anterolisthesis of L5  upon S1 with near total obliteration of the disc space at this level.  The conus medullaris terminates at the L1 level and appears unremarkable.  There is no clumping of the nerve roots of the cauda equina.  There are  no prevertebral masses.    Axial images at T12/L1 reveal no compression of the thecal sac nor the  neural foramina.  There are facet hypertrophic changes.    At L1/L2, there are increasing degenerative changes and there is now  moderate central stenosis due to a bulging disc and facet hypertrophy.  There is mild right and moderate left foraminal narrowing due to the  bulging discs.    At L2, there is a kyphoplasty.  There is no bony retropulsion.    At L2/L3, there is mild osteophytic spurring.  There is no longer central  stenosis at this level.  There are postoperative changes.  There is mild  right foraminal narrowing due to facet hypertrophy.  The left foramen is  patent.    At L3/L4, there are laminectomy defects.  There is no spinal stenosis.  There are facet hypertrophic changes.  There is minimal neural foraminal  narrowing due to facet hypertrophy.    At L4/L5, there are laminectomy defects.  There is no spinal stenosis.  The neural foramina are patent.    At L5/S1, there are postoperative changes.  There is no spinal stenosis.  There is moderately severe neural foraminal narrowing due to the  anterolisthesis similar to the prior study.    Impression  IMPRESSION:    1.  Interval surgery.  The patient is now noted to be status post  posterior fusion  from L2 through S1.  There is again noted a kyphoplasty  at L2.  There is no spinal stenosis at the postoperative levels.    2.  Interval degenerative change at L1/L2 with new moderate  multifactorial central stenosis.          Transcribe Date/Time: Mar 21 2025  8:24A    Dictated by: JOHN TAO MD    This examination was interpreted and the report reviewed and  electronically signed by:  JOHN TAO MD on Mar 21 2025  8:29AM  EST        Thank you for allowing us to participate in the care of your patient.  Should there be any questions regarding this interpretation, please call  [phone].  If you are unable to reach us at the number above,  please feel free to contact [facility] at [phone].       ASSESSMENT/PLAN  Oscar Szymanski is a 54 y.o. male here for caudal MARIXA v Lumbar TFESI    Patient denies any recent antibiotic use or infections, denies any blood thinner use, and denies contrast or local anesthetic allergies     Risks, benefits, alternatives discussed. All questions answered to the best of my ability. Patient agrees to proceed.      Our plan is as follows:  - Proceed with aforementioned procedure       Mendez Colvin, DO  Chronic Pain Management Fellow         [1] No family history on file.

## 2025-07-11 NOTE — DISCHARGE INSTRUCTIONS
DISCHARGE INSTRUCTIONS FOR INJECTIONS         After most injections, it is recommended that you relax and limit your activity for the remainder of the day unless you have been told otherwise by your pain physician.  You should not drive a car, operate machinery, or make important legal decisions unless otherwise directed by your pain physician.  You may resume your normal activity, including exercise, tomorrow.      Keep a written pain diary of how much pain relief you experienced following the injection procedure and the length of time of pain relief you experienced pain relief. Following diagnostic injections like medial branch nerve blocks, sacroiliac joint blocks, stellate ganglion injections and other blocks, it is very important you record the specific amount of pain relief you experienced immediately after the injectionand how long it lasted. Your doctor will ask you for this information at your follow up visit.     For all injections, please keep the injection site dry and inspect the site for a couple of days. You may remove the Band-Aid the day of the injection at any time.     Some discomfort, bruising or slight swelling may occur at the injection site. This is not abnormal if it occurs.  If needed you may:    -Take over the counter medication such as Tylenol or Motrin.   -Apply an ice pack for 30 minutes, 2 to 3 times a day for the first 24 hours.     You may shower today; no soaking baths, hot tubs, whirlpools or swimming pools for two days.      If you are given steroids in your injection, it may take 3-5 days for the steroid medication to take effect. You may notice a worsening of your symptoms for 1-2 days after the injection. This is not abnormal.  You may use acetaminophen, ibuprofen, or prescription medication that your doctor may have prescribed for you if you need to do so.     A few common side effects of steroids include facial flushing, sweating, restlessness, irritability,difficulty  sleeping, increase in blood sugar, and increased blood pressure. If you have diabetes, please monitor your blood sugar at least once a day for at least 5 days. If you have poorly controlled high blood pressure, monitoryour blood pressure for at least 2 days and contact your primary care physician if these numbers are unusually high for you.      If you take aspirin or non-steroidal anti-inflammatory drugs (examples are Motrin, Advil, ibuprofen, Naprosyn, Voltaren, Relafen, etc.) you may restart these this evening, but stop taking it 3 days before your next appointment, unless instructed otherwiseby your physician.      You do not need to discontinue non-aspirin-containing pain medications prior to an injection (examples: Celebrex, tramadol, hydrocodone and acetaminophen).      If you take a blood thinning medication (Coumadin, Lovenox, Fragmin,Ticlid, Plavix, Pradaxa, etc.), please discuss this with your primary care physician/cardiologist and your pain physician. These medications MUST be discontinued before you can have an injection safely, without the risk of uncontrolled bleeding. If these medications are not discontinued for an appropriate period of time, you will not be able to receivean injection. Please adhere to instructions given to you about when to restart your blood thinning medication. If you have any questions please reach out to our team.    If you are taking Coumadin, please have your INR checked the morning of your procedure and bring the result to your appointment unless otherwise instructed. If your INR is over 1.2, your injection may need to be rescheduled to avoid uncontrolled bleeding from the needle placement.     Call [facility]  and ask for Pain Management at [phone] between 8am-4pm Monday - Friday if you are experiencing the following:    If you received an epidural or spinal injection:    -Headache that doesnot go away with medicine, is worse when sitting or standing up, and is  greatly relieved upon lying down.   -Severe pain worse than or different than your baseline pain.   -Chills or fever (101º F or greater).   -Drainage or signs of infection at the injection site     Go directly to the Emergency Department if you are experiencing the following and received an epidural or spinal injection:   -Abrupt weakness or progressive weakness in your legs that starts after you leave the clinic.   -Abrupt severe or worsening numbness in your legs.   -Inability to urinate after the injection or loss of bowel or bladder control without the urge to defecate or urinate.     If you have a clinical question that cannot wait until your next appointment, please call [phone] between 8am-4pm Monday - Friday or send a [employer] message. We do our best to return all non-emergency messages within 24 hours, Monday - Friday. A nurse or physician will return your message. You may also try calling and they will do their best to answer your question(s):   - Dr. Ti Mcpherson's nurse ([phone])  - Dr. Pop Esquivel/Dr. Singh's nurse ([phone])  - Dr. Claudette Johnson/Maureen's nurse ([phone])     If you need to cancel an appointment, please call the scheduling staff at [phone] during normal business hours or leave a message at least 24 hours in advance.     If you are going to be sedated for your next procedure, you MUST have responsible adult who can legally drive accompany you home. You cannot eat or drink for at least eight hours prior to the planned procedure if you are going to receive sedation. You may take your non-blood thinning medications with a small sip of water.

## 2025-07-15 DIAGNOSIS — R39.14 BENIGN PROSTATIC HYPERPLASIA WITH INCOMPLETE BLADDER EMPTYING: ICD-10-CM

## 2025-07-15 DIAGNOSIS — N40.1 BENIGN PROSTATIC HYPERPLASIA WITH INCOMPLETE BLADDER EMPTYING: ICD-10-CM

## 2025-07-15 RX ORDER — ALFUZOSIN HYDROCHLORIDE 10 MG/1
10 TABLET, EXTENDED RELEASE ORAL DAILY
Qty: 90 TABLET | Refills: 3 | Status: SHIPPED | OUTPATIENT
Start: 2025-07-15

## 2025-08-01 DIAGNOSIS — M54.16 LUMBAR RADICULOPATHY: ICD-10-CM

## 2025-08-12 ENCOUNTER — OFFICE VISIT (OUTPATIENT)
Dept: ORTHOPEDIC SURGERY | Age: 55
End: 2025-08-12
Payer: COMMERCIAL

## 2025-08-12 DIAGNOSIS — G56.02 LEFT CARPAL TUNNEL SYNDROME: Primary | ICD-10-CM

## 2025-08-12 PROCEDURE — 99213 OFFICE O/P EST LOW 20 MIN: CPT | Performed by: ORTHOPAEDIC SURGERY

## 2025-08-15 ENCOUNTER — HOSPITAL ENCOUNTER (OUTPATIENT)
Facility: HOSPITAL | Age: 55
Discharge: HOME | End: 2025-08-15
Payer: COMMERCIAL

## 2025-08-15 VITALS
SYSTOLIC BLOOD PRESSURE: 137 MMHG | RESPIRATION RATE: 16 BRPM | DIASTOLIC BLOOD PRESSURE: 85 MMHG | OXYGEN SATURATION: 95 % | TEMPERATURE: 98.4 F | HEART RATE: 65 BPM

## 2025-08-15 DIAGNOSIS — M54.16 LUMBAR RADICULOPATHY: ICD-10-CM

## 2025-08-15 PROCEDURE — 7100000009 HC PHASE TWO TIME - INITIAL BASE CHARGE

## 2025-08-15 PROCEDURE — 2500000004 HC RX 250 GENERAL PHARMACY W/ HCPCS (ALT 636 FOR OP/ED): Performed by: ANESTHESIOLOGY

## 2025-08-15 PROCEDURE — 2500000005 HC RX 250 GENERAL PHARMACY W/O HCPCS: Performed by: ANESTHESIOLOGY

## 2025-08-15 PROCEDURE — 64483 NJX AA&/STRD TFRM EPI L/S 1: CPT | Mod: 50 | Performed by: ANESTHESIOLOGY

## 2025-08-15 PROCEDURE — 2550000001 HC RX 255 CONTRASTS: Performed by: ANESTHESIOLOGY

## 2025-08-15 PROCEDURE — 7100000010 HC PHASE TWO TIME - EACH INCREMENTAL 1 MINUTE

## 2025-08-15 PROCEDURE — 64483 NJX AA&/STRD TFRM EPI L/S 1: CPT | Performed by: ANESTHESIOLOGY

## 2025-08-15 PROCEDURE — 2720000007 HC OR 272 NO HCPCS

## 2025-08-15 RX ORDER — DEXAMETHASONE SODIUM PHOSPHATE 10 MG/ML
INJECTION INTRAMUSCULAR; INTRAVENOUS AS NEEDED
Status: COMPLETED | OUTPATIENT
Start: 2025-08-15 | End: 2025-08-15

## 2025-08-15 RX ORDER — SODIUM CHLORIDE 9 MG/ML
INJECTION, SOLUTION INTRAMUSCULAR; INTRAVENOUS; SUBCUTANEOUS AS NEEDED
Status: COMPLETED | OUTPATIENT
Start: 2025-08-15 | End: 2025-08-15

## 2025-08-15 RX ORDER — LIDOCAINE HYDROCHLORIDE 5 MG/ML
INJECTION, SOLUTION INFILTRATION; INTRAVENOUS AS NEEDED
Status: COMPLETED | OUTPATIENT
Start: 2025-08-15 | End: 2025-08-15

## 2025-08-15 RX ORDER — IBUPROFEN 200 MG
200 TABLET ORAL EVERY 6 HOURS
COMMUNITY
End: 2025-08-15 | Stop reason: HOSPADM

## 2025-08-15 RX ADMIN — SODIUM CHLORIDE 3 ML: 9 INJECTION INTRAMUSCULAR; INTRAVENOUS; SUBCUTANEOUS at 12:37

## 2025-08-15 RX ADMIN — LIDOCAINE HYDROCHLORIDE 8 ML: 5 INJECTION, SOLUTION INFILTRATION at 12:37

## 2025-08-15 RX ADMIN — DEXAMETHASONE SODIUM PHOSPHATE 10 MG: 10 INJECTION, SOLUTION INTRAMUSCULAR; INTRAVENOUS at 12:41

## 2025-08-15 RX ADMIN — IOHEXOL 2 ML: 240 INJECTION, SOLUTION INTRATHECAL; INTRAVASCULAR; INTRAVENOUS; ORAL at 12:40

## 2025-08-15 ASSESSMENT — PAIN SCALES - GENERAL
PAINLEVEL_OUTOF10: 6
PAINLEVEL_OUTOF10: 6
PAINLEVEL_OUTOF10: 0 - NO PAIN
PAINLEVEL_OUTOF10: 6

## 2025-08-15 ASSESSMENT — PAIN - FUNCTIONAL ASSESSMENT
PAIN_FUNCTIONAL_ASSESSMENT: 0-10
PAIN_FUNCTIONAL_ASSESSMENT: WONG-BAKER FACES
PAIN_FUNCTIONAL_ASSESSMENT: WONG-BAKER FACES
PAIN_FUNCTIONAL_ASSESSMENT: 0-10

## 2025-08-25 DIAGNOSIS — N40.1 BENIGN PROSTATIC HYPERPLASIA (BPH) WITH STRAINING ON URINATION: ICD-10-CM

## 2025-08-25 DIAGNOSIS — R73.01 IFG (IMPAIRED FASTING GLUCOSE): ICD-10-CM

## 2025-08-25 DIAGNOSIS — M54.50 LUMBAR BACK PAIN: ICD-10-CM

## 2025-08-25 DIAGNOSIS — Z98.890 BACK PAIN WITH HISTORY OF SPINAL SURGERY: ICD-10-CM

## 2025-08-25 DIAGNOSIS — M51.16 LUMBAR DISC DISEASE WITH RADICULOPATHY: ICD-10-CM

## 2025-08-25 DIAGNOSIS — E78.2 MIXED HYPERLIPIDEMIA: ICD-10-CM

## 2025-08-25 DIAGNOSIS — R39.16 BENIGN PROSTATIC HYPERPLASIA (BPH) WITH STRAINING ON URINATION: ICD-10-CM

## 2025-08-25 DIAGNOSIS — M54.9 BACK PAIN WITH HISTORY OF SPINAL SURGERY: ICD-10-CM

## 2025-08-25 LAB
ALBUMIN: 4.5 G/DL (ref 3.5–5.2)
ALP BLD-CCNC: 47 U/L (ref 40–129)
ALT SERPL-CCNC: 19 U/L (ref 0–50)
ANION GAP SERPL CALCULATED.3IONS-SCNC: 15 MMOL/L (ref 7–16)
AST SERPL-CCNC: 24 U/L (ref 0–50)
BASOPHILS ABSOLUTE: 0.07 K/UL (ref 0–0.2)
BASOPHILS RELATIVE PERCENT: 1 % (ref 0–2)
BILIRUB SERPL-MCNC: 0.6 MG/DL (ref 0–1.2)
BUN BLDV-MCNC: 10 MG/DL (ref 6–20)
CALCIUM SERPL-MCNC: 10 MG/DL (ref 8.6–10)
CHLORIDE BLD-SCNC: 95 MMOL/L (ref 98–107)
CHOLESTEROL, TOTAL: 240 MG/DL
CO2: 23 MMOL/L (ref 22–29)
CREAT SERPL-MCNC: 0.9 MG/DL (ref 0.7–1.2)
EOSINOPHILS ABSOLUTE: 0.25 K/UL (ref 0.05–0.5)
EOSINOPHILS RELATIVE PERCENT: 4 % (ref 0–6)
GFR, ESTIMATED: >90 ML/MIN/1.73M2
GLUCOSE BLD-MCNC: 86 MG/DL (ref 74–99)
HBA1C MFR BLD: 5.4 % (ref 4–5.6)
HCT VFR BLD CALC: 44.4 % (ref 37–54)
HDLC SERPL-MCNC: 106 MG/DL
HEMOGLOBIN: 14.3 G/DL (ref 12.5–16.5)
IMMATURE GRANULOCYTES %: 1 % (ref 0–5)
IMMATURE GRANULOCYTES ABSOLUTE: 0.03 K/UL (ref 0–0.58)
LDL CHOLESTEROL: 118 MG/DL
LYMPHOCYTES ABSOLUTE: 1.68 K/UL (ref 1.5–4)
LYMPHOCYTES RELATIVE PERCENT: 26 % (ref 20–42)
MCH RBC QN AUTO: 32.6 PG (ref 26–35)
MCHC RBC AUTO-ENTMCNC: 32.2 G/DL (ref 32–34.5)
MCV RBC AUTO: 101.4 FL (ref 80–99.9)
MONOCYTES ABSOLUTE: 0.62 K/UL (ref 0.1–0.95)
MONOCYTES RELATIVE PERCENT: 9 % (ref 2–12)
NEUTROPHILS ABSOLUTE: 3.95 K/UL (ref 1.8–7.3)
NEUTROPHILS RELATIVE PERCENT: 60 % (ref 43–80)
PDW BLD-RTO: 13.1 % (ref 11.5–15)
PLATELET # BLD: 358 K/UL (ref 130–450)
PMV BLD AUTO: 9.3 FL (ref 7–12)
POTASSIUM SERPL-SCNC: 4.7 MMOL/L (ref 3.5–5.1)
RBC # BLD: 4.38 M/UL (ref 3.8–5.8)
SODIUM BLD-SCNC: 133 MMOL/L (ref 136–145)
T4 FREE: 1.1 NG/DL (ref 0.9–1.7)
TOTAL PROTEIN: 7.3 G/DL (ref 6.4–8.3)
TRIGL SERPL-MCNC: 78 MG/DL
TSH SERPL DL<=0.05 MIU/L-ACNC: 0.8 UIU/ML (ref 0.27–4.2)
VLDLC SERPL CALC-MCNC: 16 MG/DL
WBC # BLD: 6.6 K/UL (ref 4.5–11.5)

## 2025-08-26 ENCOUNTER — OFFICE VISIT (OUTPATIENT)
Dept: FAMILY MEDICINE CLINIC | Age: 55
End: 2025-08-26
Payer: COMMERCIAL

## 2025-08-26 VITALS
BODY MASS INDEX: 31.13 KG/M2 | WEIGHT: 229.8 LBS | DIASTOLIC BLOOD PRESSURE: 64 MMHG | OXYGEN SATURATION: 94 % | SYSTOLIC BLOOD PRESSURE: 118 MMHG | HEART RATE: 81 BPM | HEIGHT: 72 IN | RESPIRATION RATE: 16 BRPM

## 2025-08-26 DIAGNOSIS — Z12.5 SCREENING FOR MALIGNANT NEOPLASM OF PROSTATE: ICD-10-CM

## 2025-08-26 DIAGNOSIS — M51.16 LUMBAR DISC DISEASE WITH RADICULOPATHY: ICD-10-CM

## 2025-08-26 DIAGNOSIS — R39.14 BENIGN PROSTATIC HYPERPLASIA WITH INCOMPLETE BLADDER EMPTYING: ICD-10-CM

## 2025-08-26 DIAGNOSIS — M54.50 LUMBAR BACK PAIN: ICD-10-CM

## 2025-08-26 DIAGNOSIS — R53.82 CHRONIC FATIGUE: ICD-10-CM

## 2025-08-26 DIAGNOSIS — N40.1 BENIGN PROSTATIC HYPERPLASIA WITH INCOMPLETE BLADDER EMPTYING: ICD-10-CM

## 2025-08-26 DIAGNOSIS — G56.02 CARPAL TUNNEL SYNDROME OF LEFT WRIST: Primary | ICD-10-CM

## 2025-08-26 DIAGNOSIS — E78.2 MIXED HYPERLIPIDEMIA: ICD-10-CM

## 2025-08-26 DIAGNOSIS — Z91.010 PEANUT ALLERGY: ICD-10-CM

## 2025-08-26 DIAGNOSIS — R29.818 NEUROGENIC CLAUDICATION: ICD-10-CM

## 2025-08-26 DIAGNOSIS — M48.062 SPINAL STENOSIS OF LUMBAR REGION WITH NEUROGENIC CLAUDICATION: ICD-10-CM

## 2025-08-26 DIAGNOSIS — E53.8 FOLIC ACID DEFICIENCY: ICD-10-CM

## 2025-08-26 DIAGNOSIS — Z87.891 PERSONAL HISTORY OF TOBACCO USE: ICD-10-CM

## 2025-08-26 PROCEDURE — 3074F SYST BP LT 130 MM HG: CPT | Performed by: FAMILY MEDICINE

## 2025-08-26 PROCEDURE — 99214 OFFICE O/P EST MOD 30 MIN: CPT | Performed by: FAMILY MEDICINE

## 2025-08-26 PROCEDURE — 3078F DIAST BP <80 MM HG: CPT | Performed by: FAMILY MEDICINE

## 2025-08-26 PROCEDURE — G0296 VISIT TO DETERM LDCT ELIG: HCPCS | Performed by: FAMILY MEDICINE

## 2025-08-26 RX ORDER — PREDNISONE 20 MG/1
40 TABLET ORAL DAILY
Qty: 14 TABLET | Refills: 0 | Status: SHIPPED | OUTPATIENT
Start: 2025-08-26 | End: 2025-09-02

## 2025-08-26 RX ORDER — OXYCODONE AND ACETAMINOPHEN 10; 325 MG/1; MG/1
1 TABLET ORAL EVERY 8 HOURS PRN
Qty: 90 TABLET | Refills: 0 | Status: SHIPPED | OUTPATIENT
Start: 2025-08-26 | End: 2025-09-25

## 2025-08-26 RX ORDER — EPINEPHRINE 0.3 MG/.3ML
0.3 INJECTION SUBCUTANEOUS ONCE
Qty: 2 EACH | Refills: 5 | Status: SHIPPED | OUTPATIENT
Start: 2025-08-26 | End: 2025-08-26

## 2025-08-26 RX ORDER — DICLOFENAC SODIUM 75 MG/1
75 TABLET, DELAYED RELEASE ORAL 2 TIMES DAILY
Qty: 60 TABLET | Refills: 3 | Status: SHIPPED | OUTPATIENT
Start: 2025-08-26 | End: 2025-08-27 | Stop reason: SDUPTHER

## 2025-08-26 RX ORDER — METHOCARBAMOL 750 MG/1
750 TABLET, FILM COATED ORAL 4 TIMES DAILY
Qty: 120 TABLET | Refills: 3 | Status: SHIPPED | OUTPATIENT
Start: 2025-08-26 | End: 2025-12-24

## 2025-08-27 RX ORDER — DICLOFENAC SODIUM 75 MG/1
75 TABLET, DELAYED RELEASE ORAL 2 TIMES DAILY
Qty: 60 TABLET | Refills: 3 | Status: SHIPPED | OUTPATIENT
Start: 2025-08-27

## 2025-08-28 ASSESSMENT — ENCOUNTER SYMPTOMS
VOICE CHANGE: 0
EYE PAIN: 0
TROUBLE SWALLOWING: 0
EYE DISCHARGE: 0
ABDOMINAL DISTENTION: 0
STRIDOR: 0
DIARRHEA: 0
SINUS PRESSURE: 0
FACIAL SWELLING: 0
VOMITING: 0
CONSTIPATION: 0
ABDOMINAL PAIN: 0
CHEST TIGHTNESS: 0
CHOKING: 0
SORE THROAT: 0
NAUSEA: 0
EYE REDNESS: 0
BACK PAIN: 1
BLOOD IN STOOL: 0
PHOTOPHOBIA: 0
RECTAL PAIN: 0
EYE ITCHING: 0
COUGH: 0
SHORTNESS OF BREATH: 0
APNEA: 0
WHEEZING: 0
RHINORRHEA: 0
COLOR CHANGE: 0
ANAL BLEEDING: 0

## (undated) DEVICE — Z INACTIVE USE 2855128 SPONGE GZ 16 PLY WVN COT 4INX4IN  HHH

## (undated) DEVICE — SUTURE VCRL SZ 3-0 L18IN ABSRB UD PS-2 L19MM 1/2 CIR J497G

## (undated) DEVICE — SUTURE ABSORBABLE BRAIDED 4-0 P-3 18 IN UD VICRYL J494G

## (undated) DEVICE — SOLUTION IV IRRIG POUR BRL 0.9% SODIUM CHL 2F7124

## (undated) DEVICE — GLOVE SURG SZ 85 L12IN FNGR THK94MIL STD WHT LTX FREE

## (undated) DEVICE — PEN: MARKING STD 100/CS: Brand: MEDICAL ACTION INDUSTRIES

## (undated) DEVICE — NEEDLE HYPO 25GA L1.5IN BLU POLYPR HUB S STL REG BVL STR

## (undated) DEVICE — 4-PORT MANIFOLD: Brand: NEPTUNE 2

## (undated) DEVICE — BLADE SAW W5.5XL18MM THK0.38MM CUT THK0.43MM REPL S STL SAG

## (undated) DEVICE — 3M™ STERI-STRIP™ REINFORCED ADHESIVE SKIN CLOSURES, R1541, 1/4 IN X 3 IN (6 MM X 75 MM), 3 STRIPS/ENVELOPE: Brand: 3M™ STERI-STRIP™

## (undated) DEVICE — APPLICATOR MEDICATED 26 CC SOLUTION HI LT ORNG CHLORAPREP

## (undated) DEVICE — 12 ML SYRINGE,LUER-LOCK TIP: Brand: MONOJECT

## (undated) DEVICE — E-Z CLEAN, NON-STICK, PTFE COATED, ELECTROSURGICAL NEEDLE ELECTRODE, MODIFIED EXTENDED INSULATION, 2.75 INCH (7 CM): Brand: MEGADYNE

## (undated) DEVICE — NEEDLE HYPO 18GA L1.5IN PNK POLYPR HUB S STL THN WALL FILL

## (undated) DEVICE — ZIMMER® STERILE DISPOSABLE TOURNIQUET CUFF WITH PROTECTIVE SLEEVE AND PLC, DUAL PORT, SINGLE BLADDER, 18 IN. (46 CM)

## (undated) DEVICE — GOWN,SIRUS,FABRNF,XL,20/CS: Brand: MEDLINE

## (undated) DEVICE — SMALL TEAR CROSS CUT RASP (11.0 X 5.0MM)

## (undated) DEVICE — DOUBLE BASIN SET: Brand: MEDLINE INDUSTRIES, INC.

## (undated) DEVICE — Z INACTIVE USE 2863041 SPONGE GZ W4XL4IN 100% COT 16 PLY RADPQ HIGHLY ABSRB

## (undated) DEVICE — TIBURON EXTREMITY SHEET: Brand: CONVERTORS

## (undated) DEVICE — GAUZE,SPONGE,4"X4",8PLY,STRL,LF,10/TRAY: Brand: MEDLINE

## (undated) DEVICE — PRECISION THIN (9.0 X 0.38 X 25.0MM)

## (undated) DEVICE — TUBING SUCT 12FR MAL ALUM SHFT FN CAP VENT UNIV CONN W/ OBT

## (undated) DEVICE — SYRINGE MED 10ML LUERLOCK TIP W/O SFTY DISP

## (undated) DEVICE — MEDI-VAC NON-CONDUCTIVE SUCTION TUBING: Brand: CARDINAL HEALTH

## (undated) DEVICE — NEPTUNE E-SEP SMOKE EVACUATION PENCIL, COATED, 70MM BLADE, PUSH BUTTON SWITCH: Brand: NEPTUNE E-SEP

## (undated) DEVICE — NEEDLE HYPO 18GA L1.5IN PNK POLYPR HUB S STL REG BVL STR

## (undated) DEVICE — SYRINGE,EAR/ULCER, 2 OZ, STERILE: Brand: MEDLINE

## (undated) DEVICE — GOWN,PREVENTION PLUS,XLN/XL,ST,24/CS: Brand: MEDLINE

## (undated) DEVICE — INTENDED FOR TISSUE SEPARATION, AND OTHER PROCEDURES THAT REQUIRE A SHARP SURGICAL BLADE TO PUNCTURE OR CUT.: Brand: BARD-PARKER ® STAINLESS STEEL BLADES

## (undated) DEVICE — CLOTH SKIN PREP 2% CHG

## (undated) DEVICE — GOWN,SIRUS,NON REINFRCD,LARGE,SET IN SL: Brand: MEDLINE

## (undated) DEVICE — DRESSING PETRO W3XL8IN OIL EMUL N ADH GZ KNIT IMPREG CELOS

## (undated) DEVICE — SOLUTION IV IRRIG WATER 1000ML POUR BRL 2F7114

## (undated) DEVICE — GLOVE,SURG,SENSICARE,ALOE,LF,PF,7: Brand: MEDLINE

## (undated) DEVICE — HANDLE CVR PATENTED RETENTION DISC STRL LIGHT SHLD

## (undated) DEVICE — ELECTRODE NDL 2.8IN COAT VALLEYLAB

## (undated) DEVICE — 1810 FOAM BLOCK NEEDLE COUNTER: Brand: DEVON

## (undated) DEVICE — STANDARD SURGICAL GOWN, L: Brand: CONVERTORS

## (undated) DEVICE — SUTURE MCRYL SZ 5-0 L18IN ABSRB UD L13MM P-3 3/8 CIR PRIM Y493G

## (undated) DEVICE — Z INACTIVE NO ACTIVE SUPPLIER APPLICATOR MEDICATED 26 CC TINT HI-LITE ORNG STRL CHLORAPREP

## (undated) DEVICE — MARKER,SKIN,WI/RULER AND LABELS: Brand: MEDLINE

## (undated) DEVICE — STANDARD HYPODERMIC NEEDLE,POLYPROPYLENE HUB: Brand: MONOJECT

## (undated) DEVICE — MASTISOL ADHESIVE LIQ 2/3ML

## (undated) DEVICE — BANDAGE,GAUZE,BULKEE II,4.5"X4.1YD,STRL: Brand: MEDLINE

## (undated) DEVICE — GLOVE ORANGE PI 7   MSG9070

## (undated) DEVICE — TOWEL,OR,DSP,ST,BLUE,STD,6/PK,12PK/CS: Brand: MEDLINE

## (undated) DEVICE — ELECTRODE PT RET AD L9FT HI MOIST COND ADH HYDRGEL CORDED

## (undated) DEVICE — WIPES SKIN CLOTH READYPREP 9 X 10.5 IN 2% CHLORHEX GLUCONATE CHG PREOP

## (undated) DEVICE — TOWEL OR BLUEE 16X26IN ST 8 PACK ORB08 16X26ORTWL

## (undated) DEVICE — DRAPE,EXTREMITY,89X128,STERILE: Brand: MEDLINE

## (undated) DEVICE — SYRINGE, LUER LOCK, 10ML: Brand: MEDLINE

## (undated) DEVICE — CLOTH PREP W7.5XL7.5IN 2% CHG SKIN ALC AND RNS FREE

## (undated) DEVICE — SURGICAL PROCEDURE PACK BASIC

## (undated) DEVICE — BANDAGE COMPR W4INXL10YD WHITE/BEIGE E MTRX HK LOOP CLSR